# Patient Record
Sex: FEMALE | Race: ASIAN | NOT HISPANIC OR LATINO
[De-identification: names, ages, dates, MRNs, and addresses within clinical notes are randomized per-mention and may not be internally consistent; named-entity substitution may affect disease eponyms.]

---

## 2017-01-22 ENCOUNTER — RESULT REVIEW (OUTPATIENT)
Age: 62
End: 2017-01-22

## 2017-01-23 ENCOUNTER — OUTPATIENT (OUTPATIENT)
Dept: OUTPATIENT SERVICES | Facility: HOSPITAL | Age: 62
LOS: 1 days | Discharge: HOME | End: 2017-01-23
Payer: MEDICARE

## 2017-01-23 DIAGNOSIS — M43.22 FUSION OF SPINE, CERVICAL REGION: Chronic | ICD-10-CM

## 2017-01-23 DIAGNOSIS — R10.13 EPIGASTRIC PAIN: ICD-10-CM

## 2017-01-23 DIAGNOSIS — R19.4 CHANGE IN BOWEL HABIT: ICD-10-CM

## 2017-01-23 PROCEDURE — 45380 COLONOSCOPY AND BIOPSY: CPT

## 2017-01-23 PROCEDURE — 88305 TISSUE EXAM BY PATHOLOGIST: CPT

## 2017-01-23 PROCEDURE — 43239 EGD BIOPSY SINGLE/MULTIPLE: CPT

## 2017-01-23 PROCEDURE — 88313 SPECIAL STAINS GROUP 2: CPT | Mod: 26

## 2017-01-23 PROCEDURE — 88313 SPECIAL STAINS GROUP 2: CPT

## 2017-01-23 PROCEDURE — 88312 SPECIAL STAINS GROUP 1: CPT | Mod: 26

## 2017-01-23 PROCEDURE — 88312 SPECIAL STAINS GROUP 1: CPT

## 2017-01-23 PROCEDURE — 88305 TISSUE EXAM BY PATHOLOGIST: CPT | Mod: 26

## 2017-01-24 LAB — SURGICAL PATHOLOGY FINAL REPORT - CH: SIGNIFICANT CHANGE UP

## 2017-01-26 DIAGNOSIS — Z79.51 LONG TERM (CURRENT) USE OF INHALED STEROIDS: ICD-10-CM

## 2017-01-26 DIAGNOSIS — J45.909 UNSPECIFIED ASTHMA, UNCOMPLICATED: ICD-10-CM

## 2017-01-26 DIAGNOSIS — D12.4 BENIGN NEOPLASM OF DESCENDING COLON: ICD-10-CM

## 2017-01-26 DIAGNOSIS — R19.4 CHANGE IN BOWEL HABIT: ICD-10-CM

## 2017-01-26 DIAGNOSIS — K64.4 RESIDUAL HEMORRHOIDAL SKIN TAGS: ICD-10-CM

## 2017-01-26 DIAGNOSIS — K57.30 DIVERTICULOSIS OF LARGE INTESTINE WITHOUT PERFORATION OR ABSCESS WITHOUT BLEEDING: ICD-10-CM

## 2017-01-26 DIAGNOSIS — K20.9 ESOPHAGITIS, UNSPECIFIED: ICD-10-CM

## 2017-01-26 DIAGNOSIS — K29.50 UNSPECIFIED CHRONIC GASTRITIS WITHOUT BLEEDING: ICD-10-CM

## 2017-01-26 DIAGNOSIS — E03.9 HYPOTHYROIDISM, UNSPECIFIED: ICD-10-CM

## 2017-01-26 DIAGNOSIS — K64.8 OTHER HEMORRHOIDS: ICD-10-CM

## 2017-01-26 DIAGNOSIS — R10.13 EPIGASTRIC PAIN: ICD-10-CM

## 2018-01-15 ENCOUNTER — INPATIENT (INPATIENT)
Facility: HOSPITAL | Age: 63
LOS: 5 days | Discharge: HOME | DRG: 194 | End: 2018-01-21
Attending: INTERNAL MEDICINE | Admitting: INTERNAL MEDICINE
Payer: MEDICARE

## 2018-01-15 VITALS
WEIGHT: 184.97 LBS | RESPIRATION RATE: 22 BRPM | SYSTOLIC BLOOD PRESSURE: 135 MMHG | DIASTOLIC BLOOD PRESSURE: 87 MMHG | HEIGHT: 62 IN | HEART RATE: 81 BPM | OXYGEN SATURATION: 66 % | TEMPERATURE: 99 F

## 2018-01-15 DIAGNOSIS — M43.22 FUSION OF SPINE, CERVICAL REGION: Chronic | ICD-10-CM

## 2018-01-15 DIAGNOSIS — K21.9 GASTRO-ESOPHAGEAL REFLUX DISEASE WITHOUT ESOPHAGITIS: ICD-10-CM

## 2018-01-15 DIAGNOSIS — J45.901 UNSPECIFIED ASTHMA WITH (ACUTE) EXACERBATION: ICD-10-CM

## 2018-01-15 DIAGNOSIS — E78.5 HYPERLIPIDEMIA, UNSPECIFIED: ICD-10-CM

## 2018-01-15 DIAGNOSIS — M48.07 SPINAL STENOSIS, LUMBOSACRAL REGION: ICD-10-CM

## 2018-01-15 DIAGNOSIS — J10.1 INFLUENZA DUE TO OTHER IDENTIFIED INFLUENZA VIRUS WITH OTHER RESPIRATORY MANIFESTATIONS: ICD-10-CM

## 2018-01-15 DIAGNOSIS — Z29.9 ENCOUNTER FOR PROPHYLACTIC MEASURES, UNSPECIFIED: ICD-10-CM

## 2018-01-15 DIAGNOSIS — J20.9 ACUTE BRONCHITIS, UNSPECIFIED: ICD-10-CM

## 2018-01-15 DIAGNOSIS — F32.9 MAJOR DEPRESSIVE DISORDER, SINGLE EPISODE, UNSPECIFIED: ICD-10-CM

## 2018-01-15 DIAGNOSIS — E03.9 HYPOTHYROIDISM, UNSPECIFIED: ICD-10-CM

## 2018-01-15 LAB
ALBUMIN SERPL ELPH-MCNC: 3.5 G/DL — SIGNIFICANT CHANGE UP (ref 3.3–5)
ALP SERPL-CCNC: 72 U/L — SIGNIFICANT CHANGE UP (ref 30–120)
ALT FLD-CCNC: 36 U/L DA — SIGNIFICANT CHANGE UP (ref 10–60)
ANION GAP SERPL CALC-SCNC: 12 MMOL/L — SIGNIFICANT CHANGE UP (ref 5–17)
AST SERPL-CCNC: 36 U/L — SIGNIFICANT CHANGE UP (ref 10–40)
BASOPHILS # BLD AUTO: 0.1 K/UL — SIGNIFICANT CHANGE UP (ref 0–0.2)
BASOPHILS NFR BLD AUTO: 0.8 % — SIGNIFICANT CHANGE UP (ref 0–2)
BILIRUB SERPL-MCNC: 0.3 MG/DL — SIGNIFICANT CHANGE UP (ref 0.2–1.2)
BUN SERPL-MCNC: 10 MG/DL — SIGNIFICANT CHANGE UP (ref 7–23)
CALCIUM SERPL-MCNC: 9.3 MG/DL — SIGNIFICANT CHANGE UP (ref 8.4–10.5)
CHLORIDE SERPL-SCNC: 101 MMOL/L — SIGNIFICANT CHANGE UP (ref 96–108)
CK SERPL-CCNC: 205 U/L — HIGH (ref 26–192)
CO2 SERPL-SCNC: 25 MMOL/L — SIGNIFICANT CHANGE UP (ref 22–31)
CREAT SERPL-MCNC: 1.01 MG/DL — SIGNIFICANT CHANGE UP (ref 0.5–1.3)
EOSINOPHIL # BLD AUTO: 0 K/UL — SIGNIFICANT CHANGE UP (ref 0–0.5)
EOSINOPHIL NFR BLD AUTO: 0 % — SIGNIFICANT CHANGE UP (ref 0–6)
FLUAV SPEC QL CULT: POSITIVE
FLUBV AG SPEC QL IA: NEGATIVE — SIGNIFICANT CHANGE UP
GLUCOSE SERPL-MCNC: 120 MG/DL — HIGH (ref 70–99)
HCT VFR BLD CALC: 40.6 % — SIGNIFICANT CHANGE UP (ref 34.5–45)
HGB BLD-MCNC: 13 G/DL — SIGNIFICANT CHANGE UP (ref 11.5–15.5)
INR BLD: 1.09 RATIO — SIGNIFICANT CHANGE UP (ref 0.88–1.16)
LACTATE SERPL-SCNC: 1.8 MMOL/L — SIGNIFICANT CHANGE UP (ref 0.7–2)
LYMPHOCYTES # BLD AUTO: 1.9 K/UL — SIGNIFICANT CHANGE UP (ref 1–3.3)
LYMPHOCYTES # BLD AUTO: 14.4 % — SIGNIFICANT CHANGE UP (ref 13–44)
MAGNESIUM SERPL-MCNC: 1.7 MG/DL — SIGNIFICANT CHANGE UP (ref 1.6–2.6)
MCHC RBC-ENTMCNC: 29.4 PG — SIGNIFICANT CHANGE UP (ref 27–34)
MCHC RBC-ENTMCNC: 32 GM/DL — SIGNIFICANT CHANGE UP (ref 32–36)
MCV RBC AUTO: 91.7 FL — SIGNIFICANT CHANGE UP (ref 80–100)
MONOCYTES # BLD AUTO: 0.6 K/UL — SIGNIFICANT CHANGE UP (ref 0–0.9)
MONOCYTES NFR BLD AUTO: 4.6 % — SIGNIFICANT CHANGE UP (ref 2–14)
NEUTROPHILS # BLD AUTO: 10.4 K/UL — HIGH (ref 1.8–7.4)
NEUTROPHILS NFR BLD AUTO: 80.2 % — HIGH (ref 43–77)
PLATELET # BLD AUTO: 397 K/UL — SIGNIFICANT CHANGE UP (ref 150–400)
POTASSIUM SERPL-MCNC: 3.9 MMOL/L — SIGNIFICANT CHANGE UP (ref 3.5–5.3)
POTASSIUM SERPL-SCNC: 3.9 MMOL/L — SIGNIFICANT CHANGE UP (ref 3.5–5.3)
PROT SERPL-MCNC: 7.7 G/DL — SIGNIFICANT CHANGE UP (ref 6–8.3)
PROTHROM AB SERPL-ACNC: 11.9 SEC — SIGNIFICANT CHANGE UP (ref 9.8–12.7)
RBC # BLD: 4.43 M/UL — SIGNIFICANT CHANGE UP (ref 3.8–5.2)
RBC # FLD: 13.7 % — SIGNIFICANT CHANGE UP (ref 10.3–14.5)
SODIUM SERPL-SCNC: 138 MMOL/L — SIGNIFICANT CHANGE UP (ref 135–145)
TROPONIN I SERPL-MCNC: 0.02 NG/ML — SIGNIFICANT CHANGE UP (ref 0.02–0.06)
WBC # BLD: 13 K/UL — HIGH (ref 3.8–10.5)
WBC # FLD AUTO: 13 K/UL — HIGH (ref 3.8–10.5)

## 2018-01-15 PROCEDURE — 71045 X-RAY EXAM CHEST 1 VIEW: CPT | Mod: 26

## 2018-01-15 PROCEDURE — 93010 ELECTROCARDIOGRAM REPORT: CPT

## 2018-01-15 PROCEDURE — 99284 EMERGENCY DEPT VISIT MOD MDM: CPT

## 2018-01-15 RX ORDER — IPRATROPIUM/ALBUTEROL SULFATE 18-103MCG
3 AEROSOL WITH ADAPTER (GRAM) INHALATION EVERY 6 HOURS
Qty: 0 | Refills: 0 | Status: DISCONTINUED | OUTPATIENT
Start: 2018-01-15 | End: 2018-01-21

## 2018-01-15 RX ORDER — ACETAMINOPHEN 500 MG
650 TABLET ORAL EVERY 6 HOURS
Qty: 0 | Refills: 0 | Status: DISCONTINUED | OUTPATIENT
Start: 2018-01-15 | End: 2018-01-21

## 2018-01-15 RX ORDER — TRAMADOL HYDROCHLORIDE 50 MG/1
100 TABLET ORAL EVERY 6 HOURS
Qty: 0 | Refills: 0 | Status: DISCONTINUED | OUTPATIENT
Start: 2018-01-15 | End: 2018-01-21

## 2018-01-15 RX ORDER — PANTOPRAZOLE SODIUM 20 MG/1
40 TABLET, DELAYED RELEASE ORAL
Qty: 0 | Refills: 0 | Status: DISCONTINUED | OUTPATIENT
Start: 2018-01-15 | End: 2018-01-21

## 2018-01-15 RX ORDER — ZOLPIDEM TARTRATE 10 MG/1
5 TABLET ORAL AT BEDTIME
Qty: 0 | Refills: 0 | Status: DISCONTINUED | OUTPATIENT
Start: 2018-01-15 | End: 2018-01-21

## 2018-01-15 RX ORDER — MONTELUKAST 4 MG/1
10 TABLET, CHEWABLE ORAL AT BEDTIME
Qty: 0 | Refills: 0 | Status: DISCONTINUED | OUTPATIENT
Start: 2018-01-15 | End: 2018-01-21

## 2018-01-15 RX ORDER — GABAPENTIN 400 MG/1
600 CAPSULE ORAL THREE TIMES A DAY
Qty: 0 | Refills: 0 | Status: DISCONTINUED | OUTPATIENT
Start: 2018-01-15 | End: 2018-01-21

## 2018-01-15 RX ORDER — TRAMADOL HYDROCHLORIDE 50 MG/1
50 TABLET ORAL EVERY 6 HOURS
Qty: 0 | Refills: 0 | Status: DISCONTINUED | OUTPATIENT
Start: 2018-01-15 | End: 2018-01-21

## 2018-01-15 RX ORDER — ALBUTEROL 90 UG/1
2 AEROSOL, METERED ORAL EVERY 6 HOURS
Qty: 0 | Refills: 0 | Status: DISCONTINUED | OUTPATIENT
Start: 2018-01-15 | End: 2018-01-21

## 2018-01-15 RX ORDER — SODIUM CHLORIDE 9 MG/ML
1000 INJECTION INTRAMUSCULAR; INTRAVENOUS; SUBCUTANEOUS
Qty: 0 | Refills: 0 | Status: DISCONTINUED | OUTPATIENT
Start: 2018-01-15 | End: 2018-01-16

## 2018-01-15 RX ORDER — BUDESONIDE AND FORMOTEROL FUMARATE DIHYDRATE 160; 4.5 UG/1; UG/1
2 AEROSOL RESPIRATORY (INHALATION)
Qty: 0 | Refills: 0 | Status: DISCONTINUED | OUTPATIENT
Start: 2018-01-15 | End: 2018-01-21

## 2018-01-15 RX ORDER — ESCITALOPRAM OXALATE 10 MG/1
10 TABLET, FILM COATED ORAL DAILY
Qty: 0 | Refills: 0 | Status: DISCONTINUED | OUTPATIENT
Start: 2018-01-15 | End: 2018-01-21

## 2018-01-15 RX ORDER — ENOXAPARIN SODIUM 100 MG/ML
40 INJECTION SUBCUTANEOUS EVERY 24 HOURS
Qty: 0 | Refills: 0 | Status: DISCONTINUED | OUTPATIENT
Start: 2018-01-15 | End: 2018-01-21

## 2018-01-15 RX ORDER — TIOTROPIUM BROMIDE 18 UG/1
1 CAPSULE ORAL; RESPIRATORY (INHALATION) DAILY
Qty: 0 | Refills: 0 | Status: DISCONTINUED | OUTPATIENT
Start: 2018-01-15 | End: 2018-01-21

## 2018-01-15 RX ORDER — IPRATROPIUM/ALBUTEROL SULFATE 18-103MCG
3 AEROSOL WITH ADAPTER (GRAM) INHALATION ONCE
Qty: 0 | Refills: 0 | Status: COMPLETED | OUTPATIENT
Start: 2018-01-15 | End: 2018-01-15

## 2018-01-15 RX ORDER — IPRATROPIUM/ALBUTEROL SULFATE 18-103MCG
3 AEROSOL WITH ADAPTER (GRAM) INHALATION EVERY 4 HOURS
Qty: 0 | Refills: 0 | Status: DISCONTINUED | OUTPATIENT
Start: 2018-01-15 | End: 2018-01-21

## 2018-01-15 RX ORDER — LEVOTHYROXINE SODIUM 125 MCG
50 TABLET ORAL DAILY
Qty: 0 | Refills: 0 | Status: DISCONTINUED | OUTPATIENT
Start: 2018-01-15 | End: 2018-01-21

## 2018-01-15 RX ADMIN — Medication 3 MILLILITER(S): at 16:09

## 2018-01-15 RX ADMIN — Medication 650 MILLIGRAM(S): at 22:11

## 2018-01-15 RX ADMIN — ENOXAPARIN SODIUM 40 MILLIGRAM(S): 100 INJECTION SUBCUTANEOUS at 18:02

## 2018-01-15 RX ADMIN — TRAMADOL HYDROCHLORIDE 50 MILLIGRAM(S): 50 TABLET ORAL at 18:03

## 2018-01-15 RX ADMIN — MONTELUKAST 10 MILLIGRAM(S): 4 TABLET, CHEWABLE ORAL at 22:10

## 2018-01-15 RX ADMIN — TRAMADOL HYDROCHLORIDE 50 MILLIGRAM(S): 50 TABLET ORAL at 18:56

## 2018-01-15 RX ADMIN — Medication 40 MILLIGRAM(S): at 22:11

## 2018-01-15 RX ADMIN — GABAPENTIN 600 MILLIGRAM(S): 400 CAPSULE ORAL at 22:10

## 2018-01-15 RX ADMIN — Medication 3 MILLILITER(S): at 19:15

## 2018-01-15 RX ADMIN — Medication 40 MILLIGRAM(S): at 17:07

## 2018-01-15 RX ADMIN — TRAMADOL HYDROCHLORIDE 50 MILLIGRAM(S): 50 TABLET ORAL at 17:08

## 2018-01-15 RX ADMIN — BUDESONIDE AND FORMOTEROL FUMARATE DIHYDRATE 2 PUFF(S): 160; 4.5 AEROSOL RESPIRATORY (INHALATION) at 19:01

## 2018-01-15 RX ADMIN — SODIUM CHLORIDE 60 MILLILITER(S): 9 INJECTION INTRAMUSCULAR; INTRAVENOUS; SUBCUTANEOUS at 17:09

## 2018-01-15 RX ADMIN — Medication 75 MILLIGRAM(S): at 18:32

## 2018-01-15 RX ADMIN — Medication 200 MILLIGRAM(S): at 17:16

## 2018-01-15 NOTE — H&P ADULT - NSHPSOCIALHISTORY_GEN_ALL_CORE
Social History:    Marital Status:   Occupation:   Lives with:     Substance Use :  Tobacco Usage:  (   ) never smoked   (   ) former smoker   (   ) current smoker  (     ) pack year  (        ) last tobacco use date  Alcohol Usage:    (     ) Advanced Directives: (     ) declined   [  ] health care proxy Social History:    Marital Status:   Occupation: Retired  Lives with: Spouse     Substance Use :  Tobacco Usage:  (X) never smoked   (   ) former smoker   (   ) current smoker  (     ) pack year  (        ) last tobacco use date  Alcohol Usage: Denies    (X) Advanced Directives: (X) declined   [  ] health care proxy

## 2018-01-15 NOTE — H&P ADULT - NSHPREVIEWOFSYSTEMS_GEN_ALL_CORE
REVIEW OF SYSTEMS:  CONSTITUTIONAL: + fever, no weight loss, + fatigue  EYES: No eye pain, visual disturbances, or discharge  ENMT:  No difficulty hearing, tinnitus, vertigo; No sinus or throat pain  NECK: No pain or stiffness  BREASTS: No pain, masses, or nipple discharge  RESPIRATORY: + cough, + wheezing, + shortness of breath  CARDIOVASCULAR: No chest pain, palpitations, dizziness, or leg swelling  GASTROINTESTINAL: No abdominal or epigastric pain. No nausea, vomiting, or hematemesis; No diarrhea or constipation. No melena or hematochezia.  GENITOURINARY: No dysuria, frequency, hematuria, or incontinence  NEUROLOGICAL: No headaches, memory loss, loss of strength, numbness, or tremors  SKIN: No itching, burning, rashes, or lesions   LYMPH NODES: No enlarged glands  ENDOCRINE: No heat or cold intolerance; No hair loss; No polydipsia or polyuria  MUSCULOSKELETAL: + Back pain and body aches.   PSYCHIATRIC: No depression, anxiety, mood swings, or difficulty sleeping  HEME/LYMPH: No easy bruising, or bleeding gums  ALLERGY AND IMMUNOLOGIC: No hives or eczema

## 2018-01-15 NOTE — ED ADULT NURSE NOTE - PMH
Asthma  trigger getting a cold. hospitalized multipl  times last 2007 denies intubation  Cervical Disc Displacement  current diagnosis  Chronic Bronchitis    Depression    GERD (Gastroesophageal Reflux Disease)    Hyperlipidemia    Hypothyroidism    Osteopenia    Spinal stenosis

## 2018-01-15 NOTE — ED ADULT NURSE NOTE - ED STAT RN HANDOFF WHERE
patient on stretcher Trauma  #1 admitted and will hold in ER.  Patient on droplet isolation for flu.

## 2018-01-15 NOTE — ED ADULT NURSE NOTE - PSH
Cervical vertebral fusion    Kyphosis    S/P Cholecystectomy  open   1989  S/P Colonoscopy  2005  wnl  S/P Foot Surgery  ORIF left foot  Termination of Pregnancy  x3  remote

## 2018-01-15 NOTE — ED PROVIDER NOTE - OBJECTIVE STATEMENT
63 y/o F pt with hx of HLD presents to ED c/o cough, SOB chills and fevers for a few days. pt tried Nebulizer treatment, Tamiflu and OTC medication with no relief. She was seen by Dr. Villalta today; given a dose of steroids and breathing treatment with no improvement and sent to ED. Denies chest pain, nausea, vomiting. No further complaints at this time.

## 2018-01-15 NOTE — H&P ADULT - NSHPPHYSICALEXAM_GEN_ALL_CORE
Vital Signs Last 24 Hrs  T(C): 37.1 (15 Alonso 2018 15:27), Max: 37.1 (15 Alonso 2018 15:27)  T(F): 98.7 (15 Alonso 2018 15:27), Max: 98.7 (15 Alonso 2018 15:27)  HR: 81 (15 Alonso 2018 15:27) (81 - 81)  BP: 135/87 (15 Alonso 2018 15:27) (135/87 - 135/87)  BP(mean): --  RR: 22 (15 Alonso 2018 15:27) (22 - 22)  SpO2: 66% (15 Alonso 2018 15:27) (66% - 66%)    PHYSICAL EXAM:  GENERAL: NAD, well-groomed, well-developed  HEAD:  Atraumatic, Normocephalic  EYES: EOMI, PERRLA, conjunctiva and sclera clear  ENMT: No tonsillar erythema, exudates, or enlargement; Moist mucous membranes, Good dentition, No lesions  NECK: Supple, No JVD, Normal thyroid  CHEST/LUNG: Bilateral extensive expiratory wheeze +. Occasional rhonchi +   HEART: Regular rate and rhythm; No murmurs, rubs, or gallops  ABDOMEN: Soft, Nontender, Nondistended; Bowel sounds present  EXTREMITIES:  2+ Peripheral Pulses, No clubbing, cyanosis, or edema  MS: No joint swelling or deformity.   LYMPH: No lymphadenopathy noted  SKIN: No rashes or lesions  NERVOUS SYSTEM:  Motor Strength 4/5 B/L upper and lower extremities; DTRs 2+ intact and symmetric  PSYCH:  Alert & Oriented X3, Good concentration.

## 2018-01-15 NOTE — H&P ADULT - PROBLEM SELECTOR PLAN 1
Patient with acute exacerbation of asthma with asthmatic bronchitis, most likely due to acute influenza A.   Will place patient on Solumedrol and Duoneb.  Continue Symbicort and Spiriva.   Pulmonary follow up.   Further management as per patient's clinical course.

## 2018-01-15 NOTE — H&P ADULT - PROBLEM SELECTOR PLAN 2
Patient with acute Influenza A.  Will place patient on Tamiflu.  Continue supportive care.  Droplet precautions.

## 2018-01-15 NOTE — H&P ADULT - HISTORY OF PRESENT ILLNESS
62 years old female with past medical history of Asthma, Cervical Disc Displacement, Chronic Bronchitis, Depression, GERD (Gastroesophageal Reflux Disease), Hyperlipidemia, Hypothyroidism, Osteopenia and spinal stenosis, who has been having increasing shortness of breath and cough for last 3-4 days. Patient also c/o fevers and body aches. Patient was seen by Dr. Mcwilliams in office today and received IM Steroids and DuoNeb. Patient continued to have shortness of breath so she came in to ER. She is c/o cough, which is not productive of sputum. 62 years old female with past medical history of Asthma, Cervical Disc Displacement, Chronic Bronchitis, Depression, GERD (Gastroesophageal Reflux Disease), Hyperlipidemia, Hypothyroidism, Osteopenia and spinal stenosis, who has been having increasing shortness of breath and cough for last 3-4 days. Patient also c/o fevers and body aches. Patient was seen by Dr. Mcwilliams in office today and received IM Steroids and DuoNeb. Patient continued to have shortness of breath so she came in to ER. She is c/o cough, which is not productive of sputum.     Patient is being admitted for further care, as she failed out patient treatment.     Patient denies any orthopnea or PND.   + Fever.   No dizziness or syncope.

## 2018-01-15 NOTE — ED ADULT NURSE NOTE - OBJECTIVE STATEMENT
Patient walked into ER from Dr. Mcwilliams's office for shortness of breath with cough causing chest and back pain for 3 days.  Patient given breathing treatment and steriod injection at Dr. Mcwilliams's office.  On arrival mask placed on patient.  O2 SATS 100% room air.

## 2018-01-15 NOTE — H&P ADULT - ASSESSMENT
62 years old female with past medical history of Asthma, Cervical Disc Displacement, Chronic Bronchitis, Depression, GERD (Gastroesophageal Reflux Disease), Hyperlipidemia, Hypothyroidism, Osteopenia and spinal stenosis, who has been having increasing shortness of breath and cough for last 3-4 days. Patient also c/o fevers and body aches. Patient was seen by Dr. Mcwilliams in office today and received IM Steroids and DuoNeb. Patient continued to have shortness of breath so she came in to ER. She is c/o cough, which is not productive of sputum.     Patient is being admitted for further care.

## 2018-01-16 DIAGNOSIS — K59.00 CONSTIPATION, UNSPECIFIED: ICD-10-CM

## 2018-01-16 LAB
CHOLEST SERPL-MCNC: 215 MG/DL — HIGH (ref 10–199)
CK SERPL-CCNC: 126 U/L — SIGNIFICANT CHANGE UP (ref 26–192)
HBA1C BLD-MCNC: 6 % — HIGH (ref 4–5.6)
HDLC SERPL-MCNC: 82 MG/DL — SIGNIFICANT CHANGE UP (ref 40–125)
LIPID PNL WITH DIRECT LDL SERPL: 105 MG/DL — SIGNIFICANT CHANGE UP
T3 SERPL-MCNC: 89 NG/DL — SIGNIFICANT CHANGE UP (ref 80–200)
T4 AB SER-ACNC: 8.7 UG/DL — SIGNIFICANT CHANGE UP (ref 4.6–12)
TOTAL CHOLESTEROL/HDL RATIO MEASUREMENT: 2.6 RATIO — LOW (ref 3.3–7.1)
TRIGL SERPL-MCNC: 139 MG/DL — SIGNIFICANT CHANGE UP (ref 10–149)
TROPONIN I SERPL-MCNC: 0.02 NG/ML — SIGNIFICANT CHANGE UP (ref 0.02–0.06)
TSH SERPL-MCNC: 0.86 UIU/ML — SIGNIFICANT CHANGE UP (ref 0.27–4.2)

## 2018-01-16 RX ORDER — POLYETHYLENE GLYCOL 3350 17 G/17G
17 POWDER, FOR SOLUTION ORAL
Qty: 0 | Refills: 0 | Status: DISCONTINUED | OUTPATIENT
Start: 2018-01-16 | End: 2018-01-21

## 2018-01-16 RX ORDER — ATORVASTATIN CALCIUM 80 MG/1
80 TABLET, FILM COATED ORAL AT BEDTIME
Qty: 0 | Refills: 0 | Status: DISCONTINUED | OUTPATIENT
Start: 2018-01-16 | End: 2018-01-21

## 2018-01-16 RX ORDER — MAGNESIUM HYDROXIDE 400 MG/1
30 TABLET, CHEWABLE ORAL DAILY
Qty: 0 | Refills: 0 | Status: DISCONTINUED | OUTPATIENT
Start: 2018-01-16 | End: 2018-01-21

## 2018-01-16 RX ORDER — DOCUSATE SODIUM 100 MG
100 CAPSULE ORAL THREE TIMES A DAY
Qty: 0 | Refills: 0 | Status: DISCONTINUED | OUTPATIENT
Start: 2018-01-16 | End: 2018-01-21

## 2018-01-16 RX ORDER — SENNA PLUS 8.6 MG/1
2 TABLET ORAL AT BEDTIME
Qty: 0 | Refills: 0 | Status: DISCONTINUED | OUTPATIENT
Start: 2018-01-16 | End: 2018-01-21

## 2018-01-16 RX ADMIN — TRAMADOL HYDROCHLORIDE 50 MILLIGRAM(S): 50 TABLET ORAL at 22:17

## 2018-01-16 RX ADMIN — ZOLPIDEM TARTRATE 5 MILLIGRAM(S): 10 TABLET ORAL at 01:39

## 2018-01-16 RX ADMIN — PANTOPRAZOLE SODIUM 40 MILLIGRAM(S): 20 TABLET, DELAYED RELEASE ORAL at 06:12

## 2018-01-16 RX ADMIN — Medication 100 MILLIGRAM(S): at 22:03

## 2018-01-16 RX ADMIN — Medication 3 MILLILITER(S): at 01:27

## 2018-01-16 RX ADMIN — ALBUTEROL 2 PUFF(S): 90 AEROSOL, METERED ORAL at 12:50

## 2018-01-16 RX ADMIN — MONTELUKAST 10 MILLIGRAM(S): 4 TABLET, CHEWABLE ORAL at 22:03

## 2018-01-16 RX ADMIN — ATORVASTATIN CALCIUM 80 MILLIGRAM(S): 80 TABLET, FILM COATED ORAL at 22:03

## 2018-01-16 RX ADMIN — TRAMADOL HYDROCHLORIDE 50 MILLIGRAM(S): 50 TABLET ORAL at 12:56

## 2018-01-16 RX ADMIN — ESCITALOPRAM OXALATE 10 MILLIGRAM(S): 10 TABLET, FILM COATED ORAL at 13:24

## 2018-01-16 RX ADMIN — Medication 3 MILLILITER(S): at 19:24

## 2018-01-16 RX ADMIN — Medication 100 MILLIGRAM(S): at 22:04

## 2018-01-16 RX ADMIN — TRAMADOL HYDROCHLORIDE 50 MILLIGRAM(S): 50 TABLET ORAL at 12:29

## 2018-01-16 RX ADMIN — Medication 40 MILLIGRAM(S): at 22:03

## 2018-01-16 RX ADMIN — GABAPENTIN 600 MILLIGRAM(S): 400 CAPSULE ORAL at 22:03

## 2018-01-16 RX ADMIN — Medication 40 MILLIGRAM(S): at 13:24

## 2018-01-16 RX ADMIN — Medication 200 MILLIGRAM(S): at 01:39

## 2018-01-16 RX ADMIN — Medication 200 MILLIGRAM(S): at 12:50

## 2018-01-16 RX ADMIN — Medication 75 MILLIGRAM(S): at 17:15

## 2018-01-16 RX ADMIN — BUDESONIDE AND FORMOTEROL FUMARATE DIHYDRATE 2 PUFF(S): 160; 4.5 AEROSOL RESPIRATORY (INHALATION) at 18:30

## 2018-01-16 RX ADMIN — ENOXAPARIN SODIUM 40 MILLIGRAM(S): 100 INJECTION SUBCUTANEOUS at 17:15

## 2018-01-16 RX ADMIN — POLYETHYLENE GLYCOL 3350 17 GRAM(S): 17 POWDER, FOR SOLUTION ORAL at 17:15

## 2018-01-16 RX ADMIN — ALBUTEROL 2 PUFF(S): 90 AEROSOL, METERED ORAL at 01:40

## 2018-01-16 RX ADMIN — Medication 650 MILLIGRAM(S): at 06:12

## 2018-01-16 RX ADMIN — TRAMADOL HYDROCHLORIDE 100 MILLIGRAM(S): 50 TABLET ORAL at 01:41

## 2018-01-16 RX ADMIN — Medication 100 MILLIGRAM(S): at 12:50

## 2018-01-16 RX ADMIN — TRAMADOL HYDROCHLORIDE 50 MILLIGRAM(S): 50 TABLET ORAL at 22:47

## 2018-01-16 RX ADMIN — GABAPENTIN 600 MILLIGRAM(S): 400 CAPSULE ORAL at 13:24

## 2018-01-16 RX ADMIN — Medication 3 MILLILITER(S): at 13:12

## 2018-01-16 RX ADMIN — TIOTROPIUM BROMIDE 1 CAPSULE(S): 18 CAPSULE ORAL; RESPIRATORY (INHALATION) at 09:20

## 2018-01-16 RX ADMIN — GABAPENTIN 600 MILLIGRAM(S): 400 CAPSULE ORAL at 06:12

## 2018-01-16 RX ADMIN — Medication 75 MILLIGRAM(S): at 06:13

## 2018-01-16 RX ADMIN — Medication 50 MICROGRAM(S): at 06:13

## 2018-01-16 RX ADMIN — Medication 100 MILLIGRAM(S): at 13:48

## 2018-01-16 RX ADMIN — BUDESONIDE AND FORMOTEROL FUMARATE DIHYDRATE 2 PUFF(S): 160; 4.5 AEROSOL RESPIRATORY (INHALATION) at 09:20

## 2018-01-16 RX ADMIN — SENNA PLUS 2 TABLET(S): 8.6 TABLET ORAL at 22:03

## 2018-01-16 RX ADMIN — Medication 3 MILLILITER(S): at 08:30

## 2018-01-16 RX ADMIN — Medication 40 MILLIGRAM(S): at 06:13

## 2018-01-16 NOTE — PROGRESS NOTE ADULT - SUBJECTIVE AND OBJECTIVE BOX
Patient is a 62y old  Female who presents with a chief complaint of flu (15 Alonso 2018 18:32)    HPI:  62 years old female with past medical history of Asthma, Cervical Disc Displacement, Chronic Bronchitis, Depression, GERD (Gastroesophageal Reflux Disease), Hyperlipidemia, Hypothyroidism, Osteopenia and spinal stenosis, who has been having increasing shortness of breath and cough for last 3-4 days. Patient also c/o fevers and body aches. Patient was seen by Dr. Mcwilliams in office today and received IM Steroids and DuoNeb. Patient continued to have shortness of breath so she came in to ER. She is c/o cough, which is not productive of sputum.     Patient is being admitted for further care, as she failed out patient treatment.     Patient denies any orthopnea or PND.   + Fever.   No dizziness or syncope. (15 Alonso 2018 16:42)    INTERVAL HPI/OVERNIGHT EVENTS:  Chart reviwed, notes reviewed.  Patient seen and examined.  Patient still having cough and shortness of breath.   Denies any chest pain or pressure.   Patient c/o back pain.   Also c/o constipation.     MEDICATIONS  (STANDING):  ALBUTerol/ipratropium for Nebulization 3 milliLiter(s) Nebulizer every 6 hours  buDESOnide 160 MICROgram(s)/formoterol 4.5 MICROgram(s) Inhaler 2 Puff(s) Inhalation two times a day  docusate sodium 100 milliGRAM(s) Oral three times a day  enoxaparin Injectable 40 milliGRAM(s) SubCutaneous every 24 hours  escitalopram 10 milliGRAM(s) Oral daily  gabapentin 600 milliGRAM(s) Oral three times a day  levothyroxine 50 MICROGram(s) Oral daily  methylPREDNISolone sodium succinate Injectable 40 milliGRAM(s) IV Push every 8 hours  montelukast 10 milliGRAM(s) Oral at bedtime  oseltamivir 75 milliGRAM(s) Oral two times a day  pantoprazole    Tablet 40 milliGRAM(s) Oral before breakfast  polyethylene glycol 3350 17 Gram(s) Oral two times a day  senna 2 Tablet(s) Oral at bedtime  sodium chloride 0.9%. 1000 milliLiter(s) (60 mL/Hr) IV Continuous <Continuous>  tiotropium 18 MICROgram(s) Capsule 1 Capsule(s) Inhalation daily    MEDICATIONS  (PRN):  acetaminophen   Tablet. 650 milliGRAM(s) Oral every 6 hours PRN Mild Pain (1 - 3)  ALBUTerol    90 MICROgram(s) HFA Inhaler 2 Puff(s) Inhalation every 6 hours PRN Shortness of Breath and/or Wheezing  ALBUTerol/ipratropium for Nebulization 3 milliLiter(s) Nebulizer every 4 hours PRN Shortness of Breath and/or Wheezing  benzonatate 100 milliGRAM(s) Oral every 8 hours PRN Cough  bisacodyl Suppository 10 milliGRAM(s) Rectal daily PRN Constipation  guaiFENesin    Syrup 200 milliGRAM(s) Oral every 6 hours PRN Cough  magnesium hydroxide Suspension 30 milliLiter(s) Oral daily PRN Constipation  traMADol 50 milliGRAM(s) Oral every 6 hours PRN Moderate Pain (4 - 6)  traMADol 100 milliGRAM(s) Oral every 6 hours PRN Severe Pain (7 - 10)  zolpidem 5 milliGRAM(s) Oral at bedtime PRN Insomnia      Allergies: No Known Allergies    Intolerances    REVIEW OF SYSTEMS:  CONSTITUTIONAL: + fever, + fatigue  EYES: No eye pain, visual disturbances, or discharge  ENMT:  No difficulty hearing, tinnitus, vertigo; No sinus or throat pain  NECK: No pain or stiffness  BREASTS: No pain, masses, or nipple discharge  RESPIRATORY: + cough, + wheezing, + shortness of breath  CARDIOVASCULAR: No chest pain, palpitations, dizziness, or leg swelling  GASTROINTESTINAL: No abdominal or epigastric pain. No nausea, vomiting, or hematemesis; No diarrhea or constipation. No melena or hematochezia.  GENITOURINARY: No dysuria, frequency, hematuria, or incontinence  NEUROLOGICAL: No headaches, memory loss, loss of strength, numbness, or tremors  SKIN: No itching, burning, rashes, or lesions   LYMPH NODES: No enlarged glands  ENDOCRINE: No heat or cold intolerance; No hair loss; No polydipsia or polyuria  MUSCULOSKELETAL: No back pain  PSYCHIATRIC: No depression, anxiety, mood swings, or difficulty sleeping  HEME/LYMPH: No easy bruising, or bleeding gums  ALLERGY AND IMMUNOLOGIC: No hives or eczema    Vital Signs Last 24 Hrs  T(C): 36.4 (16 Jan 2018 06:32), Max: 37.9 (16 Jan 2018 02:07)  T(F): 97.6 (16 Jan 2018 06:32), Max: 100.3 (16 Jan 2018 02:07)  HR: 78 (16 Jan 2018 13:12) (64 - 91)  BP: 120/59 (16 Jan 2018 06:32) (119/54 - 164/83)  BP(mean): --  RR: 16 (16 Jan 2018 06:32) (14 - 19)  SpO2: 97% (16 Jan 2018 08:30) (96% - 100%)    PHYSICAL EXAM:  GENERAL: NAD, well-groomed, well-developed  HEAD:  Atraumatic, Normocephalic  EYES: EOMI, PERRLA, conjunctiva and sclera clear  ENMT: No tonsillar erythema, exudates, or enlargement; Moist mucous membranes, Good dentition, No lesions  NECK: Supple, No JVD, Normal thyroid  NERVOUS SYSTEM:  Alert & Oriented X3, Good concentration; Bilateral LE mobile, sensation to light touch intact  CHEST/LUNG: Bilateral expiratory wheeze +. No rhonchi.    HEART: Regular rate and rhythm; No murmurs, rubs, or gallops  ABDOMEN: Soft, Nontender, Nondistended; Bowel sounds present  EXTREMITIES:  2+ Peripheral Pulses, No clubbing or cyanosis  LYMPH: No lymphadenopathy noted  SKIN: No rashes or lesions  INCISION:  Dressing dry and intact    LABS:             12.3   7.8   )-----------( 337      ( 16 Jan 2018 06:43 )             35.8     16 Jan 2018 06:43    142    |  107    |  11     ----------------------------<  177    4.6     |  26     |  0.81     Ca    8.8        16 Jan 2018 06:43  Mg     1.8       16 Jan 2018 06:43    TPro  7.7    /  Alb  3.5    /  TBili  0.3    /  DBili  x      /  AST  36     /  ALT  36     /  AlkPhos  72     15 Alonso 2018 16:33    PT/INR - ( 15 Alonso 2018 16:33 )   PT: 11.9 sec;   INR: 1.09 ratio      RADIOLOGY & ADDITIONAL TESTS:  < from: Xray Chest 1 View AP- PORTABLE-Urgent (01.15.18 @ 16:35) >  EXAM:  XR CHEST PORTABLE URGENT 1V                        PROCEDURE DATE:  01/15/2018    INTERPRETATION:  cough    A frontal chest film  demonstrates grossly clear lungs.  No acute   infiltrate or consolidation is  noted. The costophrenic angles are   grossly clear.    The heart size and vascular markings are within normal limits for   technique.      No mediastinal or hilar adenopathy is suggested. .     The osseous structures appear intact intact.     IMPRESSION:  Clear  lungs.  No acute airspace disease suggested.    < end of copied text >    Imaging Personally Reviewed:  [ ] YES      Consultant(s) Notes Reviewed:     Care Discussed with Consultants/Other Providers:

## 2018-01-16 NOTE — CONSULT NOTE ADULT - SUBJECTIVE AND OBJECTIVE BOX
HPI:  62 years old female with past medical history of Asthma, Cervical Disc Displacement,   Chronic Bronchitis, Depression, GERD, spinal stenosis presenting to the hospital with   CC of fever cough and worsening sob. Seen at her pulm office but did not get better.  Tested + for Influenza A. Tmax in hospital 100.3. + ocugh SOB and wheeze.  Denies   n/v/diarrhea CP abd pain or urinary symptoms. no recent travel or sick contacts.    Infectious Disease consult was called to evaluate pt and for antibiotoc management.    Past Medical & Surgical Hx:  PAST MEDICAL & SURGICAL HISTORY:  Depression  Hypothyroidism  Spinal stenosis  GERD (Gastroesophageal Reflux Disease)  Chronic Bronchitis  Asthma: trigger getting a cold. hospitalized multipl  times last 2007 denies intubation  Hyperlipidemia  Osteopenia  Cervical Disc Displacement: current diagnosis  Cervical vertebral fusion  S/P Colonoscopy: 2005  wnl  S/P Foot Surgery: ORIF left foot  S/P Cholecystectomy: open   1989  Kyphosis  Termination of Pregnancy: x3  remote      Social History--  EtOH: denies   Tobacco: denies   Drug Use: denies   Lives with family    FAMILY HISTORY:  No pertinent family history in first degree relatives    Allergies  No Known Allergies    Home Medications:  acetaminophen 325 mg oral tablet: 2 tab(s) orally every 6 hours, As needed, For Temp over 37.9 C (100.2 F) (15 Alonso 2018 16:02)  albuterol-ipratropium 2.5 mg-0.5 mg/3 mL inhalation solution: 3 milliliter(s) inhaled every 4 hours, As Needed (15 Alonso 2018 16:02)  bisacodyl 10 mg rectal suppository: 1 suppository(ies) rectal once a day, As needed, Constipation (15 Alonso 2018 16:02)  escitalopram 10 mg oral tablet: 1 tab(s) orally once a day (15 Alonso 2018 16:02)  Neurontin 600 mg oral tablet: 1 tab(s) orally 3 times a day (15 Alonso 2018 16:02)  NexIUM 20 mg oral delayed release capsule: 1 cap(s) orally once a day (15 Alonso 2018 16:02)  predniSONE 20 mg oral tablet: 2 tab(s) orally once a day (15 Alonso 2018 17:00)  Singulair 10 mg oral tablet: 1 tab(s) orally once a day (in the evening) (15 Alonso 2018 16:02)  Spiriva 18 mcg inhalation capsule: 1 each inhaled once a day (at bedtime) (15 Alonso 2018 16:02)  Symbicort 160 mcg-4.5 mcg/inh inhalation aerosol: 2 puff(s) inhaled 2 times a day (15 Alonso 2018 17:00)  Synthroid 50 mcg (0.05 mg) oral tablet: 1 tab(s) orally once a day (15 Alonso 2018 16:02)  Ventolin CFC free 90 mcg/inh inhalation aerosol: 2 puff(s) inhaled 4 times a day, As Needed (15 Alonso 2018 16:02)  zolpidem 5 mg oral tablet: 1 tab(s) orally once a day (at bedtime), As needed, Insomnia (15 Alonso 2018 16:02)    Current Inpatient Medications :    ANTIBIOTICS:   oseltamivir 75 milliGRAM(s) Oral two times a day      OTHER RELEVANT MEDICATIONS :  acetaminophen   Tablet. 650 milliGRAM(s) Oral every 6 hours PRN  ALBUTerol    90 MICROgram(s) HFA Inhaler 2 Puff(s) Inhalation every 6 hours PRN  ALBUTerol/ipratropium for Nebulization 3 milliLiter(s) Nebulizer every 6 hours  ALBUTerol/ipratropium for Nebulization 3 milliLiter(s) Nebulizer every 4 hours PRN  benzonatate 100 milliGRAM(s) Oral every 8 hours PRN  bisacodyl Suppository 10 milliGRAM(s) Rectal daily PRN  buDESOnide 160 MICROgram(s)/formoterol 4.5 MICROgram(s) Inhaler 2 Puff(s) Inhalation two times a day  enoxaparin Injectable 40 milliGRAM(s) SubCutaneous every 24 hours  escitalopram 10 milliGRAM(s) Oral daily  gabapentin 600 milliGRAM(s) Oral three times a day  guaiFENesin    Syrup 200 milliGRAM(s) Oral every 6 hours PRN  levothyroxine 50 MICROGram(s) Oral daily  methylPREDNISolone sodium succinate Injectable 40 milliGRAM(s) IV Push every 8 hours  montelukast 10 milliGRAM(s) Oral at bedtime  pantoprazole    Tablet 40 milliGRAM(s) Oral before breakfast  sodium chloride 0.9%. 1000 milliLiter(s) IV Continuous <Continuous>  tiotropium 18 MICROgram(s) Capsule 1 Capsule(s) Inhalation daily  traMADol 50 milliGRAM(s) Oral every 6 hours PRN  traMADol 100 milliGRAM(s) Oral every 6 hours PRN  zolpidem 5 milliGRAM(s) Oral at bedtime PRN      ROS:  CONSTITUTIONAL:  + fever no chills,  EYES:  Negative  blurry vision or double vision  CARDIOVASCULAR:  Negative for chest pain or palpitations  RESPIRATORY:  +cough, wheezing, or SOB   GASTROINTESTINAL:  Negative for nausea, vomiting, diarrhea, constipation, or abdominal pain  GENITOURINARY:  Negative frequency, urgency , dysuria or hematuria   NEUROLOGIC:  No headache, confusion, dizziness, lightheadedness  All other systems were reviewed and are negative    I&O's Detail      Physical Exam:  Vital Signs Last 24 Hrs  T(C): 36.4 (16 Jan 2018 06:32), Max: 37.9 (16 Jan 2018 02:07)  T(F): 97.6 (16 Jan 2018 06:32), Max: 100.3 (16 Jan 2018 02:07)  HR: 78 (16 Jan 2018 08:30) (64 - 91)  BP: 120/59 (16 Jan 2018 06:32) (119/54 - 164/83)  BP(mean): --  RR: 16 (16 Jan 2018 06:32) (14 - 22)  SpO2: 97% (16 Jan 2018 08:30) (66% - 100%)  Height (cm): 157.48 (01-15 @ 15:27)  Weight (kg): 83.9 (01-15 @ 15:27)  BMI (kg/m2): 33.8 (01-15 @ 15:27)  BSA (m2): 1.85 (01-15 @ 15:27)    General: well developed well nourished, in no acute distress  Eyes: sclera anicteric, pupils equal and reactive to light  ENMT: buccal mucosa moist, pharynx not injected  Neck: supple, trachea midline  Lungs: clear, no wheeze/rhonchi  Cardiovascular: regular rate and rhythm, S1 S2  Abdomen: soft, nontender, no organomegaly present, bowel sounds normal  Neurological:  alert and oriented x3, Cranial Nerves II-XII grossly intact  Skin:no increased ecchymosis/petechiae/purpura  Lymph Nodes: no palpable cervical/supraclavicular lymph nodes enlargements  Extremities: no cyanosis/clubbing/edema    Labs:                           12.3   7.8   )-----------( 337      ( 16 Jan 2018 06:43 )             35.8   01-16    142  |  107  |  11  ----------------------------<  177<H>  4.6   |  26  |  0.81    Ca    8.8      16 Jan 2018 06:43  Mg     1.8     01-16    TPro  7.7  /  Alb  3.5  /  TBili  0.3  /  DBili  x   /  AST  36  /  ALT  36  /  AlkPhos  72  01-15    RECENT CULTURES:  PENDING    RADIOLOGY & ADDITIONAL STUDIES:  EXAM:  XR CHEST PORTABLE URGENT 1V                          PROCEDURE DATE:  01/15/2018        INTERPRETATION:  cough    A frontal chest film  demonstrates grossly clear lungs.  No acute   infiltrate or consolidation is  noted. The costophrenic angles are   grossly clear.    The heart size and vascular markings are within normal limits for   technique.      No mediastinal or hilar adenopathy is suggested. .     The osseous structures appear intact intact.     IMPRESSION:  Clear  lungs.  No acute airspace disease suggested.        Assessment :   62 years old female with past medical history of Asthma, Chronic Bronchitis, admitted with   Influenza A   Asthma exacerbation      Plan :   Tamiflu x 5 day  Steroid and nebs per pulm  Pulm toileting  Increase activity    Continue with present regiment.  Appropriate use of antibiotics and adverse effects reviewed.      I have discussed the above plan of care with patient in detail. She expressed understanding of the treatment plan . Risks, benefits and alternatives discussed in detail.   I have asked if she has any questions or concerns and appropriately addressed them to the best of my ability .      > 45 minutes spent in direct patient care reviewing  the notes, lab data/ imaging , discussion with multidisciplinary team. All questions were addressed and answered to the best of my capacity .    Thank you for allowing me to participate in the care of your patient .      She Coffman MD  Infectious Disease  [phone]

## 2018-01-16 NOTE — PROGRESS NOTE ADULT - SUBJECTIVE AND OBJECTIVE BOX
PULMONARY/CRITICAL CARE      INTERVAL HPI/OVERNIGHT EVENTS: Pt. wheezing, low grade temps. Congested.    62y FemaleHPI:  62 years old female with past medical history of Asthma, Cervical Disc Displacement, Chronic Bronchitis, Depression, GERD (Gastroesophageal Reflux Disease), Hyperlipidemia, Hypothyroidism, Osteopenia and spinal stenosis, who has been having increasing shortness of breath and cough for last 3-4 days. Patient also c/o fevers and body aches. Patient was seen by Dr. Mcwilliams in office today and received IM Steroids and DuoNeb. Patient continued to have shortness of breath so she came in to ER. She is c/o cough, which is not productive of sputum.     Patient is being admitted for further care, as she failed out patient treatment.     Patient denies any orthopnea or PND.   + Fever.   No dizziness or syncope. (15 Alonso 2018 16:42)        PAST MEDICAL & SURGICAL HISTORY:  Depression  Hypothyroidism  Spinal stenosis  GERD (Gastroesophageal Reflux Disease)  Chronic Bronchitis  Asthma: trigger getting a cold. hospitalized multipl  times last 2007 denies intubation  Hyperlipidemia  Osteopenia  Cervical Disc Displacement: current diagnosis  Cervical vertebral fusion  S/P Colonoscopy: 2005  wnl  S/P Foot Surgery: ORIF left foot  S/P Cholecystectomy: open   1989  Kyphosis  Termination of Pregnancy: x3  remote        ICU Vital Signs Last 24 Hrs  T(C): 36.4 (16 Jan 2018 06:32), Max: 37.9 (16 Jan 2018 02:07)  T(F): 97.6 (16 Jan 2018 06:32), Max: 100.3 (16 Jan 2018 02:07)  HR: 78 (16 Jan 2018 08:30) (64 - 91)  BP: 120/59 (16 Jan 2018 06:32) (119/54 - 164/83)  BP(mean): --  ABP: --  ABP(mean): --  RR: 16 (16 Jan 2018 06:32) (14 - 22)  SpO2: 97% (16 Jan 2018 08:30) (66% - 100%)    Qtts:     I&O's Summary          REVIEW OF SYSTEMS:    CONSTITUTIONAL: No fever, weight loss, or fatigue  EYES: No eye pain, visual disturbances, or discharge  ENMT:  No difficulty hearing, tinnitus, vertigo; No sinus or throat pain  NECK: No pain or stiffness  BREASTS: No pain, masses, or nipple discharge  RESPIRATORY: has cough, wheezing, chills; no hemoptysis; moderate shortness of breath  CARDIOVASCULAR: No chest pain, palpitations, dizziness, or leg swelling  GASTROINTESTINAL: No abdominal or epigastric pain. No nausea, vomiting, or hematemesis; No diarrhea or constipation. No melena or hematochezia.  GENITOURINARY: No dysuria, frequency, hematuria, or incontinence  NEUROLOGICAL: No headaches, memory loss, loss of strength, numbness, or tremors  SKIN: No itching, burning, rashes, or lesions   LYMPH NODES: No enlarged glands  ENDOCRINE: No heat or cold intolerance; No hair loss  MUSCULOSKELETAL: No joint pain or swelling; No muscle, back, or extremity pain, no calf tenderness  PSYCHIATRIC: No depression, anxiety, mood swings, or difficulty sleeping  HEME/LYMPH: No easy bruising, or bleeding gums  ALLERGY AND IMMUNOLOGIC: No hives or eczema      PHYSICAL EXAM:    GENERAL: NAD, well-groomed, well-developed, NAD  HEAD:  Atraumatic, Normocephalic  EYES: EOMI, PERRLA, conjunctiva and sclera clear  ENMT: No tonsillar erythema, exudates, or enlargement; Moist mucous membranes, Good dentition, No lesions  NECK: Supple, No JVD, Normal thyroid  NERVOUS SYSTEM:  Alert & Oriented X3, Good concentration; Motor Strength 5/5 B/L upper and lower extremities  CHEST/LUNG: few bilat rhonchi, wheezing, good excursion, no rubs  HEART: Regular rate and rhythm; No murmurs, rubs, or gallops  ABDOMEN: Soft, Nontender, Nondistended; Bowel sounds present  EXTREMITIES:  2+ Peripheral Pulses, No clubbing, cyanosis, or edema  LYMPH: No lymphadenopathy noted  SKIN: No rashes or lesions        LABS:                        12.3   7.8   )-----------( 337      ( 16 Jan 2018 06:43 )             35.8     01-16    142  |  107  |  11  ----------------------------<  177<H>  4.6   |  26  |  0.81    Ca    8.8      16 Jan 2018 06:43  Mg     1.8     01-16    TPro  7.7  /  Alb  3.5  /  TBili  0.3  /  DBili  x   /  AST  36  /  ALT  36  /  AlkPhos  72  01-15    PT/INR - ( 15 Alonso 2018 16:33 )   PT: 11.9 sec;   INR: 1.09 ratio               vanco through     RADIOLOGY & ADDITIONAL STUDIES:< from: Xray Chest 1 View AP- PORTABLE-Urgent (01.15.18 @ 16:35) >  EXAM:  XR CHEST PORTABLE URGENT 1V                                  PROCEDURE DATE:  01/15/2018          INTERPRETATION:  cough    A frontal chest film  demonstrates grossly clear lungs.  No acute   infiltrate or consolidation is  noted. The costophrenic angles are   grossly clear.    The heart size and vascular markings are within normal limits for   technique.      No mediastinal or hilar adenopathy is suggested. .     The osseous structures appear intact intact.     IMPRESSION:  Clear  lungs.  No acute airspace disease suggested.                  RADHA GALVEZ M.D., ATTENDING RADIOLOGIST  This document has been electronically signed. Alonso 15 2018  4:44P    < end of copied text >        CRITICAL CARE TIME SPENT:

## 2018-01-17 PROCEDURE — 93306 TTE W/DOPPLER COMPLETE: CPT | Mod: 26

## 2018-01-17 RX ADMIN — Medication 100 MILLIGRAM(S): at 13:45

## 2018-01-17 RX ADMIN — POLYETHYLENE GLYCOL 3350 17 GRAM(S): 17 POWDER, FOR SOLUTION ORAL at 17:26

## 2018-01-17 RX ADMIN — GABAPENTIN 600 MILLIGRAM(S): 400 CAPSULE ORAL at 13:45

## 2018-01-17 RX ADMIN — ENOXAPARIN SODIUM 40 MILLIGRAM(S): 100 INJECTION SUBCUTANEOUS at 17:26

## 2018-01-17 RX ADMIN — BUDESONIDE AND FORMOTEROL FUMARATE DIHYDRATE 2 PUFF(S): 160; 4.5 AEROSOL RESPIRATORY (INHALATION) at 18:45

## 2018-01-17 RX ADMIN — GABAPENTIN 600 MILLIGRAM(S): 400 CAPSULE ORAL at 06:11

## 2018-01-17 RX ADMIN — SENNA PLUS 2 TABLET(S): 8.6 TABLET ORAL at 21:24

## 2018-01-17 RX ADMIN — Medication 40 MILLIGRAM(S): at 21:23

## 2018-01-17 RX ADMIN — PANTOPRAZOLE SODIUM 40 MILLIGRAM(S): 20 TABLET, DELAYED RELEASE ORAL at 06:11

## 2018-01-17 RX ADMIN — Medication 75 MILLIGRAM(S): at 17:26

## 2018-01-17 RX ADMIN — Medication 3 MILLILITER(S): at 08:00

## 2018-01-17 RX ADMIN — Medication 200 MILLIGRAM(S): at 17:26

## 2018-01-17 RX ADMIN — Medication 3 MILLILITER(S): at 00:37

## 2018-01-17 RX ADMIN — Medication 200 MILLIGRAM(S): at 11:29

## 2018-01-17 RX ADMIN — TRAMADOL HYDROCHLORIDE 100 MILLIGRAM(S): 50 TABLET ORAL at 14:01

## 2018-01-17 RX ADMIN — POLYETHYLENE GLYCOL 3350 17 GRAM(S): 17 POWDER, FOR SOLUTION ORAL at 06:11

## 2018-01-17 RX ADMIN — TRAMADOL HYDROCHLORIDE 100 MILLIGRAM(S): 50 TABLET ORAL at 13:53

## 2018-01-17 RX ADMIN — BUDESONIDE AND FORMOTEROL FUMARATE DIHYDRATE 2 PUFF(S): 160; 4.5 AEROSOL RESPIRATORY (INHALATION) at 06:11

## 2018-01-17 RX ADMIN — Medication 75 MILLIGRAM(S): at 06:11

## 2018-01-17 RX ADMIN — Medication 40 MILLIGRAM(S): at 13:45

## 2018-01-17 RX ADMIN — Medication 100 MILLIGRAM(S): at 21:24

## 2018-01-17 RX ADMIN — Medication 50 MICROGRAM(S): at 06:11

## 2018-01-17 RX ADMIN — ESCITALOPRAM OXALATE 10 MILLIGRAM(S): 10 TABLET, FILM COATED ORAL at 11:29

## 2018-01-17 RX ADMIN — Medication 10 MILLIGRAM(S): at 06:47

## 2018-01-17 RX ADMIN — ZOLPIDEM TARTRATE 5 MILLIGRAM(S): 10 TABLET ORAL at 00:37

## 2018-01-17 RX ADMIN — Medication 3 MILLILITER(S): at 18:56

## 2018-01-17 RX ADMIN — Medication 100 MILLIGRAM(S): at 06:11

## 2018-01-17 RX ADMIN — Medication 3 MILLILITER(S): at 13:52

## 2018-01-17 RX ADMIN — Medication 40 MILLIGRAM(S): at 06:05

## 2018-01-17 RX ADMIN — MAGNESIUM HYDROXIDE 30 MILLILITER(S): 400 TABLET, CHEWABLE ORAL at 08:14

## 2018-01-17 RX ADMIN — GABAPENTIN 600 MILLIGRAM(S): 400 CAPSULE ORAL at 21:24

## 2018-01-17 RX ADMIN — ATORVASTATIN CALCIUM 80 MILLIGRAM(S): 80 TABLET, FILM COATED ORAL at 21:24

## 2018-01-17 RX ADMIN — TIOTROPIUM BROMIDE 1 CAPSULE(S): 18 CAPSULE ORAL; RESPIRATORY (INHALATION) at 06:12

## 2018-01-17 RX ADMIN — MONTELUKAST 10 MILLIGRAM(S): 4 TABLET, CHEWABLE ORAL at 21:24

## 2018-01-17 NOTE — PROGRESS NOTE ADULT - SUBJECTIVE AND OBJECTIVE BOX
PULMONARY/CRITICAL CARE      INTERVAL HPI/OVERNIGHT EVENTS:  Still coughing, wheezing. Mild sob. ambulated. No fever.    62y FemaleHPI:  62 years old female with past medical history of Asthma, Cervical Disc Displacement, Chronic Bronchitis, Depression, GERD (Gastroesophageal Reflux Disease), Hyperlipidemia, Hypothyroidism, Osteopenia and spinal stenosis, who has been having increasing shortness of breath and cough for last 3-4 days. Patient also c/o fevers and body aches. Patient was seen by Dr. Mcwilliams in office today and received IM Steroids and DuoNeb. Patient continued to have shortness of breath so she came in to ER. She is c/o cough, which is not productive of sputum.     Patient is being admitted for further care, as she failed out patient treatment.     Patient denies any orthopnea or PND.   + Fever.   No dizziness or syncope. (15 Alonso 2018 16:42)        PAST MEDICAL & SURGICAL HISTORY:  Depression  Hypothyroidism  Spinal stenosis  GERD (Gastroesophageal Reflux Disease)  Chronic Bronchitis  Asthma: trigger getting a cold. hospitalized multipl  times last 2007 denies intubation  Hyperlipidemia  Osteopenia  Cervical Disc Displacement: current diagnosis  Cervical vertebral fusion  S/P Colonoscopy: 2005  wnl  S/P Foot Surgery: ORIF left foot  S/P Cholecystectomy: open   1989  Kyphosis  Termination of Pregnancy: x3  remote        ICU Vital Signs Last 24 Hrs  T(C): 36.8 (17 Jan 2018 05:20), Max: 37.1 (16 Jan 2018 15:48)  T(F): 98.3 (17 Jan 2018 05:20), Max: 98.8 (16 Jan 2018 15:48)  HR: 81 (17 Jan 2018 05:20) (69 - 90)  BP: 157/73 (17 Jan 2018 05:20) (121/74 - 167/57)  BP(mean): --  ABP: --  ABP(mean): --  RR: 18 (17 Jan 2018 05:20) (16 - 18)  SpO2: 94% (17 Jan 2018 05:20) (94% - 98%)    Qtts:     I&O's Summary          REVIEW OF SYSTEMS:    CONSTITUTIONAL: No fever, weight loss, or fatigue  EYES: No eye pain, visual disturbances, or discharge  ENMT:  No difficulty hearing, tinnitus, vertigo; No sinus or throat pain  NECK: No pain or stiffness  BREASTS: No pain, masses, or nipple discharge  RESPIRATORY: has cough, wheezing, No chills or hemoptysis; mild shortness of breath  CARDIOVASCULAR: No chest pain, palpitations, dizziness, or leg swelling  GASTROINTESTINAL: No abdominal or epigastric pain. No nausea, vomiting, or hematemesis; No diarrhea or constipation. No melena or hematochezia.  GENITOURINARY: No dysuria, frequency, hematuria, or incontinence  NEUROLOGICAL: No headaches, memory loss, loss of strength, numbness, or tremors  SKIN: No itching, burning, rashes, or lesions   LYMPH NODES: No enlarged glands  ENDOCRINE: No heat or cold intolerance; No hair loss  MUSCULOSKELETAL: No joint pain or swelling; No muscle, back, or extremity pain, no calf tenderness  PSYCHIATRIC: No depression, anxiety, mood swings, or difficulty sleeping  HEME/LYMPH: No easy bruising, or bleeding gums  ALLERGY AND IMMUNOLOGIC: No hives or eczema      PHYSICAL EXAM:    GENERAL: NAD, well-groomed, well-developed, NAD  HEAD:  Atraumatic, Normocephalic  EYES: EOMI, PERRLA, conjunctiva and sclera clear  ENMT: No tonsillar erythema, exudates, or enlargement; Moist mucous membranes, Good dentition, No lesions  NECK: Supple, No JVD, Normal thyroid  NERVOUS SYSTEM:  Alert & Oriented X3, Good concentration; Motor Strength 5/5 B/L upper and lower extremities  CHEST/LUNG: few bilat rhonchi, wheezing,  Good excursion, no rubs  HEART: Regular rate and rhythm; No murmurs, rubs, or gallops  ABDOMEN: Soft, Nontender, Nondistended; Bowel sounds present  EXTREMITIES:  2+ Peripheral Pulses, No clubbing, cyanosis, or edema  LYMPH: No lymphadenopathy noted  SKIN: No rashes or lesions        LABS:                        12.3   7.8   )-----------( 337      ( 16 Jan 2018 06:43 )             35.8     01-16    142  |  107  |  11  ----------------------------<  177<H>  4.6   |  26  |  0.81    Ca    8.8      16 Jan 2018 06:43  Mg     1.8     01-16    TPro  7.7  /  Alb  3.5  /  TBili  0.3  /  DBili  x   /  AST  36  /  ALT  36  /  AlkPhos  72  01-15    PT/INR - ( 15 Alonso 2018 16:33 )   PT: 11.9 sec;   INR: 1.09 ratio               vanco through     RADIOLOGY & ADDITIONAL STUDIES:  < from: Xray Chest 1 View AP- PORTABLE-Urgent (01.15.18 @ 16:35) >  EXAM:  XR CHEST PORTABLE URGENT 1V                                  PROCEDURE DATE:  01/15/2018          INTERPRETATION:  cough    A frontal chest film  demonstrates grossly clear lungs.  No acute   infiltrate or consolidation is  noted. The costophrenic angles are   grossly clear.    The heart size and vascular markings are within normal limits for   technique.      No mediastinal or hilar adenopathy is suggested. .     The osseous structures appear intact intact.     IMPRESSION:  Clear  lungs.  No acute airspace disease suggested.                  < end of copied text >      CRITICAL CARE TIME SPENT:

## 2018-01-17 NOTE — PROGRESS NOTE ADULT - SUBJECTIVE AND OBJECTIVE BOX
Patient is a 62y old  Female who presents with a chief complaint of flu (15 Alonso 2018 18:32)      INTERVAL HPI/OVERNIGHT EVENTS: Patient seen and examined. NAD. Still c/o SOB, cough and wheezing.    Vital Signs Last 24 Hrs  T(C): 36.7 (17 Jan 2018 10:06), Max: 37.1 (16 Jan 2018 15:48)  T(F): 98 (17 Jan 2018 10:06), Max: 98.8 (16 Jan 2018 15:48)  HR: 86 (17 Jan 2018 13:52) (69 - 96)  BP: 118/70 (17 Jan 2018 10:06) (118/70 - 167/57)  BP(mean): --  RR: 18 (17 Jan 2018 10:06) (16 - 18)  SpO2: 96% (17 Jan 2018 10:06) (94% - 98%)    01-16    142  |  107  |  11  ----------------------------<  177<H>  4.6   |  26  |  0.81    Ca    8.8      16 Jan 2018 06:43  Mg     1.8     01-16    TPro  7.7  /  Alb  3.5  /  TBili  0.3  /  DBili  x   /  AST  36  /  ALT  36  /  AlkPhos  72  01-15                          12.3   7.8   )-----------( 337      ( 16 Jan 2018 06:43 )             35.8     PT/INR - ( 15 Alonso 2018 16:33 )   PT: 11.9 sec;   INR: 1.09 ratio           CAPILLARY BLOOD GLUCOSE                  acetaminophen   Tablet. 650 milliGRAM(s) Oral every 6 hours PRN  ALBUTerol    90 MICROgram(s) HFA Inhaler 2 Puff(s) Inhalation every 6 hours PRN  ALBUTerol/ipratropium for Nebulization 3 milliLiter(s) Nebulizer every 6 hours  ALBUTerol/ipratropium for Nebulization 3 milliLiter(s) Nebulizer every 4 hours PRN  atorvastatin 80 milliGRAM(s) Oral at bedtime  benzonatate 100 milliGRAM(s) Oral every 8 hours PRN  bisacodyl Suppository 10 milliGRAM(s) Rectal daily PRN  buDESOnide 160 MICROgram(s)/formoterol 4.5 MICROgram(s) Inhaler 2 Puff(s) Inhalation two times a day  docusate sodium 100 milliGRAM(s) Oral three times a day  enoxaparin Injectable 40 milliGRAM(s) SubCutaneous every 24 hours  escitalopram 10 milliGRAM(s) Oral daily  gabapentin 600 milliGRAM(s) Oral three times a day  guaiFENesin    Syrup 200 milliGRAM(s) Oral every 6 hours  levothyroxine 50 MICROGram(s) Oral daily  magnesium hydroxide Suspension 30 milliLiter(s) Oral daily PRN  methylPREDNISolone sodium succinate Injectable 40 milliGRAM(s) IV Push every 8 hours  montelukast 10 milliGRAM(s) Oral at bedtime  oseltamivir 75 milliGRAM(s) Oral two times a day  pantoprazole    Tablet 40 milliGRAM(s) Oral before breakfast  polyethylene glycol 3350 17 Gram(s) Oral two times a day  senna 2 Tablet(s) Oral at bedtime  tiotropium 18 MICROgram(s) Capsule 1 Capsule(s) Inhalation daily  traMADol 50 milliGRAM(s) Oral every 6 hours PRN  traMADol 100 milliGRAM(s) Oral every 6 hours PRN  zolpidem 5 milliGRAM(s) Oral at bedtime PRN      REVIEW OF SYSTEMS:  CONSTITUTIONAL: No fever, no weight loss, or no fatigue  NECK: No pain, no stiffness  RESPIRATORY: + cough, + wheezing, no chills, no hemoptysis, + shortness of breath  CARDIOVASCULAR: No chest pain, no palpitations, no dizziness, no leg swelling  GASTROINTESTINAL: No abdominal pain. No nausea, no vomiting, no hematemesis; No diarrhea, no constipation. No melena, no hematochezia.  GENITOURINARY: No dysuria, no frequency, no hematuria, no incontinence  NEUROLOGICAL: No headaches, no loss of strength, no numbness, no tremors  SKIN: No itching, no burning  MUSCULOSKELETAL: No joint pain, no swelling; No muscle, no back, no extremity pain  PSYCHIATRIC: No depression, no mood swings,   HEME/LYMPH: No easy bruising, no bleeding gums  ALLERY AND IMMUNOLOGIC: No hives       Consultant(s) Notes Reviewed:  [ x] YES  [ ] NO    PHYSICAL EXAM:  GENERAL: NAD  HEAD:  Atraumatic, Normocephalic  EYES: EOMI, PERRLA, conjunctiva and sclera clear  ENMT: No tonsillar erythema, exudates, or enlargement; Moist mucous membranes  NECK: Supple, No JVD  NERVOUS SYSTEM:  Awake & alert  CHEST/LUNG: b/l wheezing  HEART: Regular rate and rhythm  ABDOMEN: Soft, Nontender, Nondistended; Bowel sounds present  EXTREMITIES:  No clubbing, cyanosis, or edema  LYMPH: No lymphadenopathy noted  SKIN: No rashes      Advanced care planning discussed with patient/family [x ] YES   [ ] NO    Advanced care planning discussed with patient/family. Advanced care planning forms reviewed/discussed/completed. 20 minutes spent.

## 2018-01-17 NOTE — PROGRESS NOTE ADULT - SUBJECTIVE AND OBJECTIVE BOX
TEENA CORBIN is a 62yFemale , patient examined and chart reviewed.     INTERVAL HPI/ OVERNIGHT EVENTS:   Awake Alert.   Afebrile.     PAST MEDICAL & SURGICAL HISTORY:  Depression  Hypothyroidism  Spinal stenosis  GERD (Gastroesophageal Reflux Disease)  Chronic Bronchitis  Asthma: trigger getting a cold. hospitalized multipl  times last 2007 denies intubation  Hyperlipidemia  Osteopenia  Cervical Disc Displacement: current diagnosis  Cervical vertebral fusion  S/P Colonoscopy: 2005  wnl  S/P Foot Surgery: ORIF left foot  S/P Cholecystectomy: open   1989  Kyphosis  Termination of Pregnancy: x3  remote      For details regarding the patient's social history, family history, and other miscellaneous elements, please refer the initial infectious diseases consultation and/or the admitting history and physical examination for this admission.    ROS:  CONSTITUTIONAL:  Negative fever or chills  EYES:  Negative  blurry vision or double vision  CARDIOVASCULAR:  Negative for chest pain or palpitations  RESPIRATORY:  + for cough, wheezing, No SOB   GASTROINTESTINAL:  Negative for nausea, vomiting, diarrhea, constipation, or abdominal pain  GENITOURINARY:  Negative frequency, urgency or dysuria  NEUROLOGIC:  No headache, confusion, dizziness, lightheadedness  All other systems were reviewed and are negative     Current inpatient medications :    ANTIBIOTICS/RELEVANT:  oseltamivir 75 milliGRAM(s) Oral two times a day    acetaminophen   Tablet. 650 milliGRAM(s) Oral every 6 hours PRN  ALBUTerol    90 MICROgram(s) HFA Inhaler 2 Puff(s) Inhalation every 6 hours PRN  ALBUTerol/ipratropium for Nebulization 3 milliLiter(s) Nebulizer every 6 hours  ALBUTerol/ipratropium for Nebulization 3 milliLiter(s) Nebulizer every 4 hours PRN  atorvastatin 80 milliGRAM(s) Oral at bedtime  benzonatate 100 milliGRAM(s) Oral every 8 hours PRN  bisacodyl Suppository 10 milliGRAM(s) Rectal daily PRN  buDESOnide 160 MICROgram(s)/formoterol 4.5 MICROgram(s) Inhaler 2 Puff(s) Inhalation two times a day  docusate sodium 100 milliGRAM(s) Oral three times a day  enoxaparin Injectable 40 milliGRAM(s) SubCutaneous every 24 hours  escitalopram 10 milliGRAM(s) Oral daily  gabapentin 600 milliGRAM(s) Oral three times a day  guaiFENesin    Syrup 200 milliGRAM(s) Oral every 6 hours  levothyroxine 50 MICROGram(s) Oral daily  magnesium hydroxide Suspension 30 milliLiter(s) Oral daily PRN  methylPREDNISolone sodium succinate Injectable 40 milliGRAM(s) IV Push every 8 hours  montelukast 10 milliGRAM(s) Oral at bedtime  pantoprazole    Tablet 40 milliGRAM(s) Oral before breakfast  polyethylene glycol 3350 17 Gram(s) Oral two times a day  senna 2 Tablet(s) Oral at bedtime  tiotropium 18 MICROgram(s) Capsule 1 Capsule(s) Inhalation daily  traMADol 50 milliGRAM(s) Oral every 6 hours PRN  traMADol 100 milliGRAM(s) Oral every 6 hours PRN  zolpidem 5 milliGRAM(s) Oral at bedtime PRN      Objective:    T(C): 36.7 (01-17-18 @ 10:06), Max: 37.1 (01-16-18 @ 15:48)  HR: 96 (01-17-18 @ 10:06) (69 - 96)  BP: 118/70 (01-17-18 @ 10:06) (118/70 - 167/57)  RR: 18 (01-17-18 @ 10:06) (16 - 18)  SpO2: 96% (01-17-18 @ 10:06) (94% - 98%)  Wt(kg): --      Physical Exam:  General: well developed well nourished, in no acute distress  Eyes: sclera anicteric, pupils equal and reactive to light  ENMT: buccal mucosa moist, pharynx not injected  Neck: supple, trachea midline  Lungs: +wheeze/rhonchi  Cardiovascular: regular rate and rhythm, S1 S2  Abdomen: soft, nontender, no organomegaly present, bowel sounds normal  Neurological: alert and oriented x3, Cranial Nerves II-XII grossly intact  Skin: no increased ecchymosis/petechiae/purpura  Lymph Nodes: no palpable cervical/supraclavicular lymph nodes enlargements  Extremities: no cyanosis/clubbing/edema      LABS:                          12.3   7.8   )-----------( 337      ( 16 Jan 2018 06:43 )             35.8       01-16    142  |  107  |  11  ----------------------------<  177<H>  4.6   |  26  |  0.81    Ca    8.8      16 Jan 2018 06:43  Mg     1.8     01-16    TPro  7.7  /  Alb  3.5  /  TBili  0.3  /  DBili  x   /  AST  36  /  ALT  36  /  AlkPhos  72  01-15      PT/INR - ( 15 Alonso 2018 16:33 )   PT: 11.9 sec;   INR: 1.09 ratio      MICROBIOLOGY:  Influenza Virus A and B Rapid Antigen (01.15.18 @ 17:09)    Influenza A Rapid Screen: Positive: Interpretation of Influenza virus Type A - Antigen Enzyme Immunoassay:  This test screens for Influenza A.  A negative result does not eliminate  the possibility of infection with influenza A.  Depending upon the type  and adequacy of the specimen collected, the specificity of the assay  ranges from % and the sensitivity from 20-70%.    Influenza B Rapid Screen: Negative: Interpretation of Influenza Virus Type B - Antigen Enzyme Immunoassay:  This test screens for Influenza B.  A negative result does not eliminate  the possibility of infection with influenza B. Depending upon the type  and adequacy of the specimen collected, the specificty of the assay  ranges from % and the sensitivity from 20-70%.      Culture - Blood (collected 16 Jan 2018 00:16)  Source: .Blood Blood-Peripheral  Preliminary Report (17 Jan 2018 01:02):    No growth to date.    Culture - Blood (collected 16 Jan 2018 00:16)  Source: .Blood Blood-Peripheral  Preliminary Report (17 Jan 2018 01:02):    No growth to date.    RADIOLOGY & ADDITIONAL STUDIES:    EXAM:  XR CHEST PORTABLE URGENT 1V                                  PROCEDURE DATE:  01/15/2018          INTERPRETATION:  cough    A frontal chest film  demonstrates grossly clear lungs.  No acute   infiltrate or consolidation is  noted. The costophrenic angles are   grossly clear.    The heart size and vascular markings are within normal limits for   technique.      No mediastinal or hilar adenopathy is suggested. .     The osseous structures appear intact intact.     IMPRESSION:  Clear  lungs.  No acute airspace disease suggested.    Assessment :  62 years old female with past medical history of Asthma, Chronic Bronchitis, admitted with   Influenza A   Asthma exacerbation    Plan :   Tamiflu x 5 day  Steroid and nebs per pulm  Pulm toileting  Increase activity    Continue with present regiment.  Appropriate use of antibiotics and adverse effects reviewed.      I have discussed the above plan of care with patient in detail. She expressed understanding of the  treatment plan . Risks, benefits and alternatives discussed in detail.   I have asked if she has any questions or concerns and appropriately addressed them to the best of my ability .    > 35 minutes were spent in direct patient care reviewing notes, medications ,labs data/ imaging , discussion with multidisciplinary team.    Thank you for allowing me to participate in care of your patient .    She Coffman MD  Infectious Disease  [phone]

## 2018-01-18 LAB
ANION GAP SERPL CALC-SCNC: 8 MMOL/L — SIGNIFICANT CHANGE UP (ref 5–17)
BUN SERPL-MCNC: 16 MG/DL — SIGNIFICANT CHANGE UP (ref 7–23)
CALCIUM SERPL-MCNC: 9 MG/DL — SIGNIFICANT CHANGE UP (ref 8.4–10.5)
CHLORIDE SERPL-SCNC: 103 MMOL/L — SIGNIFICANT CHANGE UP (ref 96–108)
CO2 SERPL-SCNC: 28 MMOL/L — SIGNIFICANT CHANGE UP (ref 22–31)
CREAT SERPL-MCNC: 0.78 MG/DL — SIGNIFICANT CHANGE UP (ref 0.5–1.3)
GLUCOSE SERPL-MCNC: 127 MG/DL — HIGH (ref 70–99)
HCT VFR BLD CALC: 36.2 % — SIGNIFICANT CHANGE UP (ref 34.5–45)
HGB BLD-MCNC: 11.6 G/DL — SIGNIFICANT CHANGE UP (ref 11.5–15.5)
MAGNESIUM SERPL-MCNC: 1.8 MG/DL — SIGNIFICANT CHANGE UP (ref 1.6–2.6)
MCHC RBC-ENTMCNC: 29.3 PG — SIGNIFICANT CHANGE UP (ref 27–34)
MCHC RBC-ENTMCNC: 31.9 GM/DL — LOW (ref 32–36)
MCV RBC AUTO: 91.7 FL — SIGNIFICANT CHANGE UP (ref 80–100)
PLATELET # BLD AUTO: 366 K/UL — SIGNIFICANT CHANGE UP (ref 150–400)
POTASSIUM SERPL-MCNC: 4.1 MMOL/L — SIGNIFICANT CHANGE UP (ref 3.5–5.3)
POTASSIUM SERPL-SCNC: 4.1 MMOL/L — SIGNIFICANT CHANGE UP (ref 3.5–5.3)
RBC # BLD: 3.95 M/UL — SIGNIFICANT CHANGE UP (ref 3.8–5.2)
RBC # FLD: 13.5 % — SIGNIFICANT CHANGE UP (ref 10.3–14.5)
SODIUM SERPL-SCNC: 139 MMOL/L — SIGNIFICANT CHANGE UP (ref 135–145)
WBC # BLD: 15.1 K/UL — HIGH (ref 3.8–10.5)
WBC # FLD AUTO: 15.1 K/UL — HIGH (ref 3.8–10.5)

## 2018-01-18 RX ADMIN — Medication 200 MILLIGRAM(S): at 23:30

## 2018-01-18 RX ADMIN — MONTELUKAST 10 MILLIGRAM(S): 4 TABLET, CHEWABLE ORAL at 21:35

## 2018-01-18 RX ADMIN — Medication 3 MILLILITER(S): at 23:29

## 2018-01-18 RX ADMIN — Medication 40 MILLIGRAM(S): at 05:27

## 2018-01-18 RX ADMIN — Medication 200 MILLIGRAM(S): at 00:49

## 2018-01-18 RX ADMIN — SENNA PLUS 2 TABLET(S): 8.6 TABLET ORAL at 21:34

## 2018-01-18 RX ADMIN — GABAPENTIN 600 MILLIGRAM(S): 400 CAPSULE ORAL at 13:27

## 2018-01-18 RX ADMIN — ZOLPIDEM TARTRATE 5 MILLIGRAM(S): 10 TABLET ORAL at 23:29

## 2018-01-18 RX ADMIN — Medication 200 MILLIGRAM(S): at 05:28

## 2018-01-18 RX ADMIN — ESCITALOPRAM OXALATE 10 MILLIGRAM(S): 10 TABLET, FILM COATED ORAL at 11:31

## 2018-01-18 RX ADMIN — Medication 3 MILLILITER(S): at 19:43

## 2018-01-18 RX ADMIN — GABAPENTIN 600 MILLIGRAM(S): 400 CAPSULE ORAL at 05:28

## 2018-01-18 RX ADMIN — PANTOPRAZOLE SODIUM 40 MILLIGRAM(S): 20 TABLET, DELAYED RELEASE ORAL at 05:28

## 2018-01-18 RX ADMIN — ZOLPIDEM TARTRATE 5 MILLIGRAM(S): 10 TABLET ORAL at 00:49

## 2018-01-18 RX ADMIN — Medication 3 MILLILITER(S): at 13:40

## 2018-01-18 RX ADMIN — Medication 3 MILLILITER(S): at 07:52

## 2018-01-18 RX ADMIN — Medication 200 MILLIGRAM(S): at 11:31

## 2018-01-18 RX ADMIN — ENOXAPARIN SODIUM 40 MILLIGRAM(S): 100 INJECTION SUBCUTANEOUS at 17:34

## 2018-01-18 RX ADMIN — GABAPENTIN 600 MILLIGRAM(S): 400 CAPSULE ORAL at 21:34

## 2018-01-18 RX ADMIN — Medication 100 MILLIGRAM(S): at 05:28

## 2018-01-18 RX ADMIN — Medication 3 MILLILITER(S): at 00:37

## 2018-01-18 RX ADMIN — TRAMADOL HYDROCHLORIDE 50 MILLIGRAM(S): 50 TABLET ORAL at 21:43

## 2018-01-18 RX ADMIN — BUDESONIDE AND FORMOTEROL FUMARATE DIHYDRATE 2 PUFF(S): 160; 4.5 AEROSOL RESPIRATORY (INHALATION) at 21:43

## 2018-01-18 RX ADMIN — Medication 650 MILLIGRAM(S): at 17:25

## 2018-01-18 RX ADMIN — Medication 75 MILLIGRAM(S): at 05:28

## 2018-01-18 RX ADMIN — Medication 100 MILLIGRAM(S): at 21:34

## 2018-01-18 RX ADMIN — BUDESONIDE AND FORMOTEROL FUMARATE DIHYDRATE 2 PUFF(S): 160; 4.5 AEROSOL RESPIRATORY (INHALATION) at 05:37

## 2018-01-18 RX ADMIN — Medication 75 MILLIGRAM(S): at 17:34

## 2018-01-18 RX ADMIN — ALBUTEROL 2 PUFF(S): 90 AEROSOL, METERED ORAL at 05:40

## 2018-01-18 RX ADMIN — Medication 200 MILLIGRAM(S): at 17:34

## 2018-01-18 RX ADMIN — TIOTROPIUM BROMIDE 1 CAPSULE(S): 18 CAPSULE ORAL; RESPIRATORY (INHALATION) at 05:28

## 2018-01-18 RX ADMIN — POLYETHYLENE GLYCOL 3350 17 GRAM(S): 17 POWDER, FOR SOLUTION ORAL at 17:34

## 2018-01-18 RX ADMIN — Medication 50 MICROGRAM(S): at 05:28

## 2018-01-18 RX ADMIN — ATORVASTATIN CALCIUM 80 MILLIGRAM(S): 80 TABLET, FILM COATED ORAL at 21:34

## 2018-01-18 RX ADMIN — MAGNESIUM HYDROXIDE 30 MILLILITER(S): 400 TABLET, CHEWABLE ORAL at 11:31

## 2018-01-18 RX ADMIN — Medication 100 MILLIGRAM(S): at 13:27

## 2018-01-18 RX ADMIN — TRAMADOL HYDROCHLORIDE 50 MILLIGRAM(S): 50 TABLET ORAL at 22:13

## 2018-01-18 RX ADMIN — Medication 650 MILLIGRAM(S): at 16:34

## 2018-01-18 RX ADMIN — POLYETHYLENE GLYCOL 3350 17 GRAM(S): 17 POWDER, FOR SOLUTION ORAL at 05:28

## 2018-01-18 NOTE — PROGRESS NOTE ADULT - SUBJECTIVE AND OBJECTIVE BOX
PULMONARY/CRITICAL CARE      INTERVAL HPI/OVERNIGHT EVENTS:  Pt. still wheezing alot. Ambulated. Coughing alot. No fever    62y FemaleHPI:  62 years old female with past medical history of Asthma, Cervical Disc Displacement, Chronic Bronchitis, Depression, GERD (Gastroesophageal Reflux Disease), Hyperlipidemia, Hypothyroidism, Osteopenia and spinal stenosis, who has been having increasing shortness of breath and cough for last 3-4 days. Patient also c/o fevers and body aches. Patient was seen by Dr. Mcwilliams in office today and received IM Steroids and DuoNeb. Patient continued to have shortness of breath so she came in to ER. She is c/o cough, which is not productive of sputum.     Patient is being admitted for further care, as she failed out patient treatment.     Patient denies any orthopnea or PND.   + Fever.   No dizziness or syncope. (15 Alonso 2018 16:42)        PAST MEDICAL & SURGICAL HISTORY:  Depression  Hypothyroidism  Spinal stenosis  GERD (Gastroesophageal Reflux Disease)  Chronic Bronchitis  Asthma: trigger getting a cold. hospitalized multipl  times last 2007 denies intubation  Hyperlipidemia  Osteopenia  Cervical Disc Displacement: current diagnosis  Cervical vertebral fusion  S/P Colonoscopy: 2005  wnl  S/P Foot Surgery: ORIF left foot  S/P Cholecystectomy: open   1989  Kyphosis  Termination of Pregnancy: x3  remote        ICU Vital Signs Last 24 Hrs  T(C): 36.9 (18 Jan 2018 05:13), Max: 36.9 (17 Jan 2018 14:47)  T(F): 98.5 (18 Jan 2018 05:13), Max: 98.5 (18 Jan 2018 05:13)  HR: 65 (18 Jan 2018 05:13) (65 - 96)  BP: 133/74 (18 Jan 2018 05:13) (118/70 - 161/79)  BP(mean): --  ABP: --  ABP(mean): --  RR: 18 (18 Jan 2018 05:13) (18 - 20)  SpO2: 98% (18 Jan 2018 05:13) (95% - 98%)    Qtts:     I&O's Summary    17 Jan 2018 07:01  -  18 Jan 2018 07:00  --------------------------------------------------------  IN: 300 mL / OUT: 0 mL / NET: 300 mL            REVIEW OF SYSTEMS:    CONSTITUTIONAL: No fever, weight loss, or fatigue  EYES: No eye pain, visual disturbances, or discharge  ENMT:  No difficulty hearing, tinnitus, vertigo; No sinus or throat pain  NECK: No pain or stiffness  BREASTS: No pain, masses, or nipple discharge  RESPIRATORY: has cough, wheezing, No chills or hemoptysis; mild shortness of breath  CARDIOVASCULAR: No chest pain, palpitations, dizziness, or leg swelling  GASTROINTESTINAL: No abdominal or epigastric pain. No nausea, vomiting, or hematemesis; No diarrhea or constipation. No melena or hematochezia.  GENITOURINARY: No dysuria, frequency, hematuria, or incontinence  NEUROLOGICAL: No headaches, memory loss, loss of strength, numbness, or tremors  SKIN: No itching, burning, rashes, or lesions   LYMPH NODES: No enlarged glands  ENDOCRINE: No heat or cold intolerance; No hair loss  MUSCULOSKELETAL: No joint pain or swelling; No muscle, back, or extremity pain, no calf tenderness  PSYCHIATRIC: No depression, anxiety, mood swings, or difficulty sleeping  HEME/LYMPH: No easy bruising, or bleeding gums  ALLERGY AND IMMUNOLOGIC: No hives or eczema      PHYSICAL EXAM:    GENERAL: NAD, well-groomed, well-developed, NAD  HEAD:  Atraumatic, Normocephalic  EYES: EOMI, PERRLA, conjunctiva and sclera clear  ENMT: No tonsillar erythema, exudates, or enlargement; Moist mucous membranes, Good dentition, No lesions  NECK: Supple, No JVD, Normal thyroid  NERVOUS SYSTEM:  Alert & Oriented X3, Good concentration; Motor Strength 5/5 B/L upper and lower extremities  CHEST/LUNG: few bilat rhonchi, wheezing,  Good excursion, no rubs  HEART: Regular rate and rhythm; No murmurs, rubs, or gallops  ABDOMEN: Soft, Nontender, Nondistended; Bowel sounds present  EXTREMITIES:  2+ Peripheral Pulses, No clubbing, cyanosis, or edema  LYMPH: No lymphadenopathy noted  SKIN: No rashes or lesions        LABS:                        11.6   15.1  )-----------( 366      ( 18 Jan 2018 07:10 )             36.2                 vanco through     RADIOLOGY & ADDITIONAL STUDIES:      CRITICAL CARE TIME SPENT: PULMONARY/CRITICAL CARE      INTERVAL HPI/OVERNIGHT EVENTS:  Pt. still wheezing alot. Ambulated. Coughing alot. No fever    62y FemaleHPI:  62 years old female with past medical history of Asthma, Cervical Disc Displacement, Chronic Bronchitis, Depression, GERD (Gastroesophageal Reflux Disease), Hyperlipidemia, Hypothyroidism, Osteopenia and spinal stenosis, who has been having increasing shortness of breath and cough for last 3-4 days. Patient also c/o fevers and body aches. Patient was seen by Dr. Mcwilliams in office today and received IM Steroids and DuoNeb. Patient continued to have shortness of breath so she came in to ER. She is c/o cough, which is not productive of sputum.     Patient is being admitted for further care, as she failed out patient treatment.     Patient denies any orthopnea or PND.   + Fever.   No dizziness or syncope. (15 Alonso 2018 16:42)        PAST MEDICAL & SURGICAL HISTORY:  Depression  Hypothyroidism  Spinal stenosis  GERD (Gastroesophageal Reflux Disease)  Chronic Bronchitis  Asthma: trigger getting a cold. hospitalized multipl  times last 2007 denies intubation  Hyperlipidemia  Osteopenia  Cervical Disc Displacement: current diagnosis  Cervical vertebral fusion  S/P Colonoscopy: 2005  wnl  S/P Foot Surgery: ORIF left foot  S/P Cholecystectomy: open   1989  Kyphosis  Termination of Pregnancy: x3  remote        ICU Vital Signs Last 24 Hrs  T(C): 36.9 (18 Jan 2018 05:13), Max: 36.9 (17 Jan 2018 14:47)  T(F): 98.5 (18 Jan 2018 05:13), Max: 98.5 (18 Jan 2018 05:13)  HR: 65 (18 Jan 2018 05:13) (65 - 96)  BP: 133/74 (18 Jan 2018 05:13) (118/70 - 161/79)  BP(mean): --  ABP: --  ABP(mean): --  RR: 18 (18 Jan 2018 05:13) (18 - 20)  SpO2: 98% (18 Jan 2018 05:13) (95% - 98%)    Qtts:     I&O's Summary    17 Jan 2018 07:01  -  18 Jan 2018 07:00  --------------------------------------------------------  IN: 300 mL / OUT: 0 mL / NET: 300 mL            REVIEW OF SYSTEMS:    CONSTITUTIONAL: No fever, weight loss, or fatigue  EYES: No eye pain, visual disturbances, or discharge  ENMT:  No difficulty hearing, tinnitus, vertigo; No sinus or throat pain  NECK: No pain or stiffness  BREASTS: No pain, masses, or nipple discharge  RESPIRATORY: has cough, wheezing, No chills or hemoptysis; mild shortness of breath  CARDIOVASCULAR: No chest pain, palpitations, dizziness, or leg swelling  GASTROINTESTINAL: No abdominal or epigastric pain. No nausea, vomiting, or hematemesis; No diarrhea or constipation. No melena or hematochezia.  GENITOURINARY: No dysuria, frequency, hematuria, or incontinence  NEUROLOGICAL: No headaches, memory loss, loss of strength, numbness, or tremors  SKIN: No itching, burning, rashes, or lesions   LYMPH NODES: No enlarged glands  ENDOCRINE: No heat or cold intolerance; No hair loss  MUSCULOSKELETAL: No joint pain or swelling; No muscle, back, or extremity pain, no calf tenderness  PSYCHIATRIC: No depression, anxiety, mood swings, or difficulty sleeping  HEME/LYMPH: No easy bruising, or bleeding gums  ALLERGY AND IMMUNOLOGIC: No hives or eczema      PHYSICAL EXAM:    GENERAL: NAD, well-groomed, well-developed, NAD  HEAD:  Atraumatic, Normocephalic  EYES: EOMI, PERRLA, conjunctiva and sclera clear  ENMT: No tonsillar erythema, exudates, or enlargement; Moist mucous membranes, Good dentition, No lesions  NECK: Supple, No JVD, Normal thyroid  NERVOUS SYSTEM:  Alert & Oriented X3, Good concentration; Motor Strength 5/5 B/L upper and lower extremities  CHEST/LUNG: few bilat rhonchi, wheezing,  Good excursion, no rubs  HEART: Regular rate and rhythm; No murmurs, rubs, or gallops  ABDOMEN: Soft, Nontender, Nondistended; Bowel sounds present  EXTREMITIES:  2+ Peripheral Pulses, No clubbing, cyanosis, or edema  LYMPH: No lymphadenopathy noted  SKIN: No rashes or lesions        LABS:                        11.6   15.1  )-----------( 366      ( 18 Jan 2018 07:10 )             36.2                 vanco through     RADIOLOGY & ADDITIONAL STUDIES:< from: US Transthoracic Echocardiogram w/Doppler Complete (01.16.18 @ 09:04) >  EXAM:  US TTE W DOPPLER COMPLETE                                  PROCEDURE DATE:  01/16/2018          INTERPRETATION:  Ordering Physician: MARIO FITCH [id number]    Indication: Shortness of breath    Technician: Joce Alvarez    Study Quality:Fair   A complete echocardiographic study was performed utilizing standard   protocol including spectral and color Doppler in all echocardiographic   windows.    Height: 5 foot 2 inches  Weight: 185 pounds  BSA: 1.85 sq m  Blood Pressure: 120/59    MEASUREMENTS  IVS: 0.94 cm  PWT: 0.90 cm  LA: 4.1 cm  AO: 2.4 cm  LVIDd: 5.0 cm  LVIDs: 2.8 cm    LVEF: Approximately 70%  RVSP: Approximately 30 mmHg  RA Pressure: 3 mmHg  IVC: Normal in size with normal respiratory variation    FINDINGS  Left Ventricle: The left ventricle appears normal in size and wall   thickness. Left ventricular systolic function is normal with ejection   fraction estimated at approximately 70%.  Right Ventricle: Right ventricle is normal in size and systolic function.  Left Atrium: Left atrium is mildly dilated.  Right Atrium: Right atrium appears grossly normal.  Mitral Valve: The mitral valve is thickened without stenosis. Mitral   regurgitation is present, appearing mild in degree by Limited Doppler   evaluation.  Aortic Valve: Aortic valve is thickened without stenosis. Aortic   insufficiency appearing mild - moderate in degree is demonstrated by   Limited Doppler evaluation.  Tricuspid Valve: Mild tricuspid regurgitation is present  Pulmonic Valve: Trace pulmonic insufficiency is present  Diastolic Function: There is mild left ventricular diastolic dysfunction  Pericardium: There is no pericardial effusion      CONCLUSIONS:    1. Left ventricle is normal in size and wall thickness. Left ventricular   ejection fraction is estimated at approximately 70%.  2. Mild left ventricular diastolic dysfunction.  3. Mild left atrial enlargement.  4. Normal right ventricular size and systolic function.  5. Thickened mitral valve without stenosis. There is  mitral   regurgitation appearing mild in degree by limited Doppler study  6. Thickened aortic valve without stenosis. Aortic insufficiency   appearing mild to moderate in degree by limited Doppler evaluation  7. Mild tricuspid regurgitation.  8. Trace pulmonic insufficiency.                  JACK MANTILLA M.D., ATTENDING CARDIOLOGIST  This document has been electronically signed. Jan 17 2018  8:28AM              < end of copied text >        CRITICAL CARE TIME SPENT:

## 2018-01-18 NOTE — PROGRESS NOTE ADULT - SUBJECTIVE AND OBJECTIVE BOX
TEENA CORBIN is a 62yFemale , patient examined and chart reviewed.     INTERVAL HPI/ OVERNIGHT EVENTS:   Still wheezing. Resp status stable.  Afebrile.    PAST MEDICAL & SURGICAL HISTORY:  Depression  Hypothyroidism  Spinal stenosis  GERD (Gastroesophageal Reflux Disease)  Chronic Bronchitis  Asthma: trigger getting a cold. hospitalized multipl  times last 2007 denies intubation  Hyperlipidemia  Osteopenia  Cervical Disc Displacement: current diagnosis  Cervical vertebral fusion  S/P Colonoscopy: 2005  wnl  S/P Foot Surgery: ORIF left foot  S/P Cholecystectomy: open   1989  Kyphosis  Termination of Pregnancy: x3  remote    For details regarding the patient's social history, family history, and other miscellaneous elements, please refer the initial infectious diseases consultation and/or the admitting history and physical examination for this admission.    ROS:  CONSTITUTIONAL:  Negative fever or chills, feels well, good appetite  EYES:  Negative  blurry vision or double vision  CARDIOVASCULAR:  Negative for chest pain or palpitations  RESPIRATORY:  Negative for cough, wheezing, or SOB   GASTROINTESTINAL:  Negative for nausea, vomiting, diarrhea, constipation, or abdominal pain  GENITOURINARY:  Negative frequency, urgency or dysuria  NEUROLOGIC:  No headache, confusion, dizziness, lightheadedness  All other systems were reviewed and are negative     Current inpatient medications :    ANTIBIOTICS/RELEVANT:  oseltamivir 75 milliGRAM(s) Oral two times a day    acetaminophen   Tablet. 650 milliGRAM(s) Oral every 6 hours PRN  ALBUTerol    90 MICROgram(s) HFA Inhaler 2 Puff(s) Inhalation every 6 hours PRN  ALBUTerol/ipratropium for Nebulization 3 milliLiter(s) Nebulizer every 6 hours  ALBUTerol/ipratropium for Nebulization 3 milliLiter(s) Nebulizer every 4 hours PRN  atorvastatin 80 milliGRAM(s) Oral at bedtime  benzonatate 100 milliGRAM(s) Oral every 8 hours PRN  bisacodyl Suppository 10 milliGRAM(s) Rectal daily PRN  buDESOnide 160 MICROgram(s)/formoterol 4.5 MICROgram(s) Inhaler 2 Puff(s) Inhalation two times a day  docusate sodium 100 milliGRAM(s) Oral three times a day  enoxaparin Injectable 40 milliGRAM(s) SubCutaneous every 24 hours  escitalopram 10 milliGRAM(s) Oral daily  gabapentin 600 milliGRAM(s) Oral three times a day  guaiFENesin    Syrup 200 milliGRAM(s) Oral every 6 hours  levothyroxine 50 MICROGram(s) Oral daily  magnesium hydroxide Suspension 30 milliLiter(s) Oral daily PRN  montelukast 10 milliGRAM(s) Oral at bedtime  pantoprazole    Tablet 40 milliGRAM(s) Oral before breakfast  polyethylene glycol 3350 17 Gram(s) Oral two times a day  predniSONE   Tablet 50 milliGRAM(s) Oral daily  senna 2 Tablet(s) Oral at bedtime  tiotropium 18 MICROgram(s) Capsule 1 Capsule(s) Inhalation daily  traMADol 50 milliGRAM(s) Oral every 6 hours PRN  traMADol 100 milliGRAM(s) Oral every 6 hours PRN  zolpidem 5 milliGRAM(s) Oral at bedtime PRN      Objective:    01-17 @ 07:01  -  01-18 @ 07:00  --------------------------------------------------------  IN: 300 mL / OUT: 0 mL / NET: 300 mL      T(C): 36.9 (01-18-18 @ 14:44), Max: 36.9 (01-18-18 @ 05:13)  HR: 77 (01-18-18 @ 14:44) (65 - 77)  BP: 137/80 (01-18-18 @ 14:44) (133/74 - 161/79)  RR: 20 (01-18-18 @ 14:44) (18 - 20)  SpO2: 97% (01-18-18 @ 14:44) (95% - 98%)  Wt(kg): --      Physical Exam:  General: well developed well nourished, in no acute distress  Eyes: sclera anicteric, pupils equal and reactive to light  ENMT: buccal mucosa moist, pharynx not injected  Neck: supple, trachea midline  Lungs: +wheeze/rhonchi  Cardiovascular: regular rate and rhythm, S1 S2  Abdomen: soft, nontender, no organomegaly present, bowel sounds normal  Neurological: alert and oriented x3, Cranial Nerves II-XII grossly intact  Skin: no increased ecchymosis/petechiae/purpura  Lymph Nodes: no palpable cervical/supraclavicular lymph nodes enlargements  Extremities: no cyanosis/clubbing/edema        LABS:                          11.6   15.1  )-----------( 366      ( 18 Jan 2018 07:10 )             36.2       01-18    139  |  103  |  16  ----------------------------<  127<H>  4.1   |  28  |  0.78    Ca    9.0      18 Jan 2018 07:09  Mg     1.8     01-18    MICROBIOLOGY:    Culture - Blood (collected 16 Jan 2018 00:16)  Source: .Blood Blood-Peripheral  Preliminary Report (17 Jan 2018 01:02):    No growth to date.    Culture - Blood (collected 16 Jan 2018 00:16)  Source: .Blood Blood-Peripheral  Preliminary Report (17 Jan 2018 01:02):    No growth to date.    RADIOLOGY & ADDITIONAL STUDIES:    EXAM:  XR CHEST PORTABLE URGENT 1V                            PROCEDURE DATE:  01/15/2018        INTERPRETATION:  cough    A frontal chest film  demonstrates grossly clear lungs.  No acute   infiltrate or consolidation is  noted. The costophrenic angles are   grossly clear.    The heart size and vascular markings are within normal limits for   technique.      No mediastinal or hilar adenopathy is suggested. .     The osseous structures appear intact intact.     IMPRESSION:  Clear  lungs.  No acute airspace disease suggested.    Assessment :  62 years old female with past medical history of Asthma, Chronic Bronchitis, admitted with   Influenza A   Asthma exacerbation    Plan :   Tamiflu x 5 day  Steroid and nebs per pulm  Pulm toileting  Increase activity    Continue with present regiment.  Appropriate use of antibiotics and adverse effects reviewed.      I have discussed the above plan of care with patient in detail. She expressed understanding of the  treatment plan . Risks, benefits and alternatives discussed in detail.   I have asked if she has any questions or concerns and appropriately addressed them to the best of my ability .    > 35 minutes were spent in direct patient care reviewing notes, medications ,labs data/ imaging , discussion with multidisciplinary team.    Thank you for allowing me to participate in care of your patient .    She Coffman MD  Infectious Disease  [phone]

## 2018-01-18 NOTE — PROGRESS NOTE ADULT - SUBJECTIVE AND OBJECTIVE BOX
Patient is a 62y old  Female who presents with a chief complaint of flu (15 Alonso 2018 18:32)    HPI:  62 years old female with past medical history of Asthma, Cervical Disc Displacement, Chronic Bronchitis, Depression, GERD (Gastroesophageal Reflux Disease), Hyperlipidemia, Hypothyroidism, Osteopenia and spinal stenosis, who has been having increasing shortness of breath and cough for last 3-4 days. Patient also c/o fevers and body aches. Patient was seen by Dr. Mcwilliams in office today and received IM Steroids and DuoNeb. Patient continued to have shortness of breath so she came in to ER. She is c/o cough, which is not productive of sputum.     Patient is being admitted for further care, as she failed out patient treatment.     Patient denies any orthopnea or PND.   + Fever.   No dizziness or syncope. (15 Alonso 2018 16:42)    INTERVAL HPI/OVERNIGHT EVENTS:  Chart reviwed, notes reviewed.  Patient seen and examined.  Patient still having cough and shortness of breath.   Denies any chest pain or pressure.   Patient c/o back pain.       MEDICATIONS  (STANDING):  ALBUTerol/ipratropium for Nebulization 3 milliLiter(s) Nebulizer every 6 hours  atorvastatin 80 milliGRAM(s) Oral at bedtime  buDESOnide 160 MICROgram(s)/formoterol 4.5 MICROgram(s) Inhaler 2 Puff(s) Inhalation two times a day  docusate sodium 100 milliGRAM(s) Oral three times a day  enoxaparin Injectable 40 milliGRAM(s) SubCutaneous every 24 hours  escitalopram 10 milliGRAM(s) Oral daily  gabapentin 600 milliGRAM(s) Oral three times a day  guaiFENesin    Syrup 200 milliGRAM(s) Oral every 6 hours  levothyroxine 50 MICROGram(s) Oral daily  montelukast 10 milliGRAM(s) Oral at bedtime  oseltamivir 75 milliGRAM(s) Oral two times a day  pantoprazole    Tablet 40 milliGRAM(s) Oral before breakfast  polyethylene glycol 3350 17 Gram(s) Oral two times a day  predniSONE   Tablet 50 milliGRAM(s) Oral daily  senna 2 Tablet(s) Oral at bedtime  tiotropium 18 MICROgram(s) Capsule 1 Capsule(s) Inhalation daily    MEDICATIONS  (PRN):  acetaminophen   Tablet. 650 milliGRAM(s) Oral every 6 hours PRN Mild Pain (1 - 3)  ALBUTerol    90 MICROgram(s) HFA Inhaler 2 Puff(s) Inhalation every 6 hours PRN Shortness of Breath and/or Wheezing  ALBUTerol/ipratropium for Nebulization 3 milliLiter(s) Nebulizer every 4 hours PRN Shortness of Breath and/or Wheezing  benzonatate 100 milliGRAM(s) Oral every 8 hours PRN Cough  bisacodyl Suppository 10 milliGRAM(s) Rectal daily PRN Constipation  magnesium hydroxide Suspension 30 milliLiter(s) Oral daily PRN Constipation  traMADol 50 milliGRAM(s) Oral every 6 hours PRN Moderate Pain (4 - 6)  traMADol 100 milliGRAM(s) Oral every 6 hours PRN Severe Pain (7 - 10)  zolpidem 5 milliGRAM(s) Oral at bedtime PRN Insomnia      Allergies: No Known Allergies    Intolerances    REVIEW OF SYSTEMS:  CONSTITUTIONAL: + fever, + fatigue  EYES: No eye pain, visual disturbances, or discharge  ENMT:  No difficulty hearing, tinnitus, vertigo; No sinus or throat pain  NECK: No pain or stiffness  BREASTS: No pain, masses, or nipple discharge  RESPIRATORY: + cough, + wheezing, + shortness of breath  CARDIOVASCULAR: No chest pain, palpitations, dizziness, or leg swelling  GASTROINTESTINAL: No abdominal or epigastric pain. No nausea, vomiting, or hematemesis; No diarrhea or constipation. No melena or hematochezia.  GENITOURINARY: No dysuria, frequency, hematuria, or incontinence  NEUROLOGICAL: No headaches, memory loss, loss of strength, numbness, or tremors  SKIN: No itching, burning, rashes, or lesions   LYMPH NODES: No enlarged glands  ENDOCRINE: No heat or cold intolerance; No hair loss; No polydipsia or polyuria  MUSCULOSKELETAL: No back pain  PSYCHIATRIC: No depression, anxiety, mood swings, or difficulty sleeping  HEME/LYMPH: No easy bruising, or bleeding gums  ALLERGY AND IMMUNOLOGIC: No hives or eczema    Vital Signs Last 24 Hrs  T(C): 36.7 (18 Jan 2018 17:13), Max: 36.9 (18 Jan 2018 05:13)  T(F): 98.1 (18 Jan 2018 17:13), Max: 98.5 (18 Jan 2018 05:13)  HR: 77 (18 Jan 2018 17:13) (65 - 77)  BP: 163/88 (18 Jan 2018 17:13) (133/74 - 163/88)  BP(mean): --  RR: 19 (18 Jan 2018 17:13) (18 - 20)  SpO2: 97% (18 Jan 2018 17:13) (95% - 98%)    PHYSICAL EXAM:  GENERAL: NAD, well-groomed, well-developed  HEAD:  Atraumatic, Normocephalic  EYES: EOMI, PERRLA, conjunctiva and sclera clear  ENMT: No tonsillar erythema, exudates, or enlargement; Moist mucous membranes, Good dentition, No lesions  NECK: Supple, No JVD, Normal thyroid  NERVOUS SYSTEM:  Alert & Oriented X3, Good concentration; Bilateral LE mobile, sensation to light touch intact  CHEST/LUNG: Bilateral expiratory wheeze +. No rhonchi.    HEART: Regular rate and rhythm; No murmurs, rubs, or gallops  ABDOMEN: Soft, Nontender, Nondistended; Bowel sounds present  EXTREMITIES:  2+ Peripheral Pulses, No clubbing or cyanosis  LYMPH: No lymphadenopathy noted  SKIN: No rashes or lesions    LABS:                                11.6   15.1  )-----------( 366      ( 18 Jan 2018 07:10 )             36.2     18 Jan 2018 07:09    139    |  103    |  16     ----------------------------<  127    4.1     |  28     |  0.78     Ca    9.0        18 Jan 2018 07:09  Mg     1.8       18 Jan 2018 07:09    01-15 [id number] 6.0    01-15 Chol 215<H>  HDL 82 Trig 139    Thyroid Stimulating Hormone, Serum: 0.86 uIU/mL (01-15 @ 23:15)    Creatine Kinase, Serum: 126 U/L (01-16 @ 06:43)  Creatine Kinase, Serum: 205 U/L (01-15 @ 17:05)    Troponin I, Serum: .019 ng/mL (01-16 @ 06:43)  Troponin I, Serum: .023 ng/mL (01-15 @ 17:05)    Culture Results:   No growth to date. (01-16 @ 00:16)  Culture Results:   No growth to date. (01-16 @ 00:16)      RADIOLOGY & ADDITIONAL TESTS:  < from: US Transthoracic Echocardiogram w/Doppler Complete (01.16.18 @ 09:04) >  EXAM:  US TTE W DOPPLER COMPLETE                        PROCEDURE DATE:  01/16/2018    INTERPRETATION:  Ordering Physician: MARIO FITCH [id number]    Indication: Shortness of breath    CONCLUSIONS:    1. Left ventricle is normal in size and wall thickness. Left ventricular   ejection fraction is estimated at approximately 70%.  2. Mild left ventricular diastolic dysfunction.  3. Mild left atrial enlargement.  4. Normal right ventricular size and systolic function.  5. Thickened mitral valve without stenosis. There is  mitral   regurgitation appearing mild in degree by limited Doppler study  6. Thickened aortic valve without stenosis. Aortic insufficiency   appearing mild to moderate in degree by limited Doppler evaluation  7. Mild tricuspid regurgitation.  8. Trace pulmonic insufficiency.    < end of copied text >    Imaging Personally Reviewed:  [X] YES      Consultant(s) Notes Reviewed:  Yes    Care Discussed with Consultants/Other Providers: Yes

## 2018-01-19 RX ORDER — AZITHROMYCIN 500 MG/1
250 TABLET, FILM COATED ORAL DAILY
Qty: 0 | Refills: 0 | Status: DISCONTINUED | OUTPATIENT
Start: 2018-01-19 | End: 2018-01-21

## 2018-01-19 RX ADMIN — BUDESONIDE AND FORMOTEROL FUMARATE DIHYDRATE 2 PUFF(S): 160; 4.5 AEROSOL RESPIRATORY (INHALATION) at 06:46

## 2018-01-19 RX ADMIN — PANTOPRAZOLE SODIUM 40 MILLIGRAM(S): 20 TABLET, DELAYED RELEASE ORAL at 06:44

## 2018-01-19 RX ADMIN — BUDESONIDE AND FORMOTEROL FUMARATE DIHYDRATE 2 PUFF(S): 160; 4.5 AEROSOL RESPIRATORY (INHALATION) at 19:05

## 2018-01-19 RX ADMIN — Medication 100 MILLIGRAM(S): at 21:55

## 2018-01-19 RX ADMIN — ATORVASTATIN CALCIUM 80 MILLIGRAM(S): 80 TABLET, FILM COATED ORAL at 21:55

## 2018-01-19 RX ADMIN — ENOXAPARIN SODIUM 40 MILLIGRAM(S): 100 INJECTION SUBCUTANEOUS at 17:08

## 2018-01-19 RX ADMIN — Medication 3 MILLILITER(S): at 00:52

## 2018-01-19 RX ADMIN — GABAPENTIN 600 MILLIGRAM(S): 400 CAPSULE ORAL at 16:13

## 2018-01-19 RX ADMIN — Medication 75 MILLIGRAM(S): at 17:07

## 2018-01-19 RX ADMIN — MONTELUKAST 10 MILLIGRAM(S): 4 TABLET, CHEWABLE ORAL at 21:55

## 2018-01-19 RX ADMIN — ESCITALOPRAM OXALATE 10 MILLIGRAM(S): 10 TABLET, FILM COATED ORAL at 11:30

## 2018-01-19 RX ADMIN — GABAPENTIN 600 MILLIGRAM(S): 400 CAPSULE ORAL at 21:55

## 2018-01-19 RX ADMIN — Medication 3 MILLILITER(S): at 13:42

## 2018-01-19 RX ADMIN — Medication 650 MILLIGRAM(S): at 21:51

## 2018-01-19 RX ADMIN — Medication 3 MILLILITER(S): at 06:29

## 2018-01-19 RX ADMIN — SENNA PLUS 2 TABLET(S): 8.6 TABLET ORAL at 21:55

## 2018-01-19 RX ADMIN — Medication 100 MILLIGRAM(S): at 06:45

## 2018-01-19 RX ADMIN — GABAPENTIN 600 MILLIGRAM(S): 400 CAPSULE ORAL at 06:44

## 2018-01-19 RX ADMIN — POLYETHYLENE GLYCOL 3350 17 GRAM(S): 17 POWDER, FOR SOLUTION ORAL at 17:08

## 2018-01-19 RX ADMIN — Medication 200 MILLIGRAM(S): at 11:30

## 2018-01-19 RX ADMIN — TIOTROPIUM BROMIDE 1 CAPSULE(S): 18 CAPSULE ORAL; RESPIRATORY (INHALATION) at 06:46

## 2018-01-19 RX ADMIN — Medication 50 MICROGRAM(S): at 06:44

## 2018-01-19 RX ADMIN — Medication 100 MILLIGRAM(S): at 16:12

## 2018-01-19 RX ADMIN — AZITHROMYCIN 250 MILLIGRAM(S): 500 TABLET, FILM COATED ORAL at 11:30

## 2018-01-19 RX ADMIN — POLYETHYLENE GLYCOL 3350 17 GRAM(S): 17 POWDER, FOR SOLUTION ORAL at 06:45

## 2018-01-19 RX ADMIN — Medication 200 MILLIGRAM(S): at 23:39

## 2018-01-19 RX ADMIN — Medication 3 MILLILITER(S): at 06:32

## 2018-01-19 RX ADMIN — ZOLPIDEM TARTRATE 5 MILLIGRAM(S): 10 TABLET ORAL at 23:39

## 2018-01-19 RX ADMIN — Medication 3 MILLILITER(S): at 20:34

## 2018-01-19 RX ADMIN — Medication 50 MILLIGRAM(S): at 06:44

## 2018-01-19 RX ADMIN — Medication 650 MILLIGRAM(S): at 20:06

## 2018-01-19 RX ADMIN — Medication 200 MILLIGRAM(S): at 17:07

## 2018-01-19 RX ADMIN — Medication 75 MILLIGRAM(S): at 06:44

## 2018-01-19 RX ADMIN — Medication 200 MILLIGRAM(S): at 06:45

## 2018-01-19 NOTE — PROGRESS NOTE ADULT - SUBJECTIVE AND OBJECTIVE BOX
PULMONARY/CRITICAL CARE      INTERVAL HPI/OVERNIGHT EVENTS:  Still wheezing, coughing alot. Yellow sputum now.    62y FemaleHPI:  62 years old female with past medical history of Asthma, Cervical Disc Displacement, Chronic Bronchitis, Depression, GERD (Gastroesophageal Reflux Disease), Hyperlipidemia, Hypothyroidism, Osteopenia and spinal stenosis, who has been having increasing shortness of breath and cough for last 3-4 days. Patient also c/o fevers and body aches. Patient was seen by Dr. Mcwilliams in office today and received IM Steroids and DuoNeb. Patient continued to have shortness of breath so she came in to ER. She is c/o cough, which is not productive of sputum.     Patient is being admitted for further care, as she failed out patient treatment.     Patient denies any orthopnea or PND.   + Fever.   No dizziness or syncope. (15 Alonso 2018 16:42)        PAST MEDICAL & SURGICAL HISTORY:  Depression  Hypothyroidism  Spinal stenosis  GERD (Gastroesophageal Reflux Disease)  Chronic Bronchitis  Asthma: trigger getting a cold. hospitalized multipl  times last 2007 denies intubation  Hyperlipidemia  Osteopenia  Cervical Disc Displacement: current diagnosis  Cervical vertebral fusion  S/P Colonoscopy: 2005  wnl  S/P Foot Surgery: ORIF left foot  S/P Cholecystectomy: open   1989  Kyphosis  Termination of Pregnancy: x3  remote        ICU Vital Signs Last 24 Hrs  T(C): 37.1 (19 Jan 2018 05:18), Max: 37.1 (19 Jan 2018 05:18)  T(F): 98.8 (19 Jan 2018 05:18), Max: 98.8 (19 Jan 2018 05:18)  HR: 64 (19 Jan 2018 06:30) (64 - 77)  BP: 122/71 (19 Jan 2018 05:18) (122/71 - 163/88)  BP(mean): --  ABP: --  ABP(mean): --  RR: 18 (19 Jan 2018 05:18) (18 - 20)  SpO2: 95% (19 Jan 2018 06:30) (93% - 98%)    Qtts:     I&O's Summary    REVIEW OF SYSTEMS:    CONSTITUTIONAL: No fever, weight loss, or fatigue  EYES: No eye pain, visual disturbances, or discharge  ENMT:  No difficulty hearing, tinnitus, vertigo; No sinus or throat pain  NECK: No pain or stiffness  BREASTS: No pain, masses, or nipple discharge  RESPIRATORY: has cough, wheezing, No chills or hemoptysis; mild shortness of breath  CARDIOVASCULAR: No chest pain, palpitations, dizziness, or leg swelling  GASTROINTESTINAL: No abdominal or epigastric pain. No nausea, vomiting, or hematemesis; No diarrhea or constipation. No melena or hematochezia.  GENITOURINARY: No dysuria, frequency, hematuria, or incontinence  NEUROLOGICAL: No headaches, memory loss, loss of strength, numbness, or tremors  SKIN: No itching, burning, rashes, or lesions   LYMPH NODES: No enlarged glands  ENDOCRINE: No heat or cold intolerance; No hair loss  MUSCULOSKELETAL: No joint pain or swelling; No muscle, back, or extremity pain, no calf tenderness  PSYCHIATRIC: No depression, anxiety, mood swings, or difficulty sleeping  HEME/LYMPH: No easy bruising, or bleeding gums  ALLERGY AND IMMUNOLOGIC: No hives or eczema      PHYSICAL EXAM:    GENERAL: NAD, well-groomed, well-developed, NAD  HEAD:  Atraumatic, Normocephalic  EYES: EOMI, PERRLA, conjunctiva and sclera clear  ENMT: No tonsillar erythema, exudates, or enlargement; Moist mucous membranes, Good dentition, No lesions  NECK: Supple, No JVD, Normal thyroid  NERVOUS SYSTEM:  Alert & Oriented X3, Good concentration; Motor Strength 5/5 B/L upper and lower extremities  CHEST/LUNG: few bilat rhonchi, wheezing,  Good excursion, no rubs  HEART: Regular rate and rhythm; No murmurs, rubs, or gallops  ABDOMEN: Soft, Nontender, Nondistended; Bowel sounds present  EXTREMITIES:  2+ Peripheral Pulses, No clubbing, cyanosis, or edema  LYMPH: No lymphadenopathy noted  SKIN: No rashes or lesions            LABS:                        11.6   15.1  )-----------( 366      ( 18 Jan 2018 07:10 )             36.2     01-18    139  |  103  |  16  ----------------------------<  127<H>  4.1   |  28  |  0.78    Ca    9.0      18 Jan 2018 07:09  Mg     1.8     01-18            vanco through     RADIOLOGY & ADDITIONAL STUDIES:      CRITICAL CARE TIME SPENT:

## 2018-01-19 NOTE — PROGRESS NOTE ADULT - SUBJECTIVE AND OBJECTIVE BOX
Patient is a 62y old  Female who presents with a chief complaint of flu (15 Alonso 2018 18:32)    HPI:  62 years old female with past medical history of Asthma, Cervical Disc Displacement, Chronic Bronchitis, Depression, GERD (Gastroesophageal Reflux Disease), Hyperlipidemia, Hypothyroidism, Osteopenia and spinal stenosis, who has been having increasing shortness of breath and cough for last 3-4 days. Patient also c/o fevers and body aches. Patient was seen by Dr. Mcwilliams in office today and received IM Steroids and DuoNeb. Patient continued to have shortness of breath so she came in to ER. She is c/o cough, which is not productive of sputum.     Patient is being admitted for further care, as she failed out patient treatment.     Patient denies any orthopnea or PND.   + Fever.   No dizziness or syncope. (15 Alonso 2018 16:42)    INTERVAL HPI/OVERNIGHT EVENTS:  Chart reviwed, notes reviewed.  Patient seen and examined.  Patient still having cough and shortness of breath.   Now has yellow sputum.   Denies any chest pain or pressure.     MEDICATIONS  (STANDING):  ALBUTerol/ipratropium for Nebulization 3 milliLiter(s) Nebulizer every 6 hours  atorvastatin 80 milliGRAM(s) Oral at bedtime  azithromycin   Tablet 250 milliGRAM(s) Oral daily  buDESOnide 160 MICROgram(s)/formoterol 4.5 MICROgram(s) Inhaler 2 Puff(s) Inhalation two times a day  docusate sodium 100 milliGRAM(s) Oral three times a day  enoxaparin Injectable 40 milliGRAM(s) SubCutaneous every 24 hours  escitalopram 10 milliGRAM(s) Oral daily  gabapentin 600 milliGRAM(s) Oral three times a day  guaiFENesin    Syrup 200 milliGRAM(s) Oral every 6 hours  levothyroxine 50 MICROGram(s) Oral daily  montelukast 10 milliGRAM(s) Oral at bedtime  oseltamivir 75 milliGRAM(s) Oral two times a day  pantoprazole    Tablet 40 milliGRAM(s) Oral before breakfast  polyethylene glycol 3350 17 Gram(s) Oral two times a day  predniSONE   Tablet 50 milliGRAM(s) Oral daily  senna 2 Tablet(s) Oral at bedtime  tiotropium 18 MICROgram(s) Capsule 1 Capsule(s) Inhalation daily    MEDICATIONS  (PRN):  acetaminophen   Tablet. 650 milliGRAM(s) Oral every 6 hours PRN Mild Pain (1 - 3)  ALBUTerol    90 MICROgram(s) HFA Inhaler 2 Puff(s) Inhalation every 6 hours PRN Shortness of Breath and/or Wheezing  ALBUTerol/ipratropium for Nebulization 3 milliLiter(s) Nebulizer every 4 hours PRN Shortness of Breath and/or Wheezing  benzonatate 100 milliGRAM(s) Oral every 8 hours PRN Cough  bisacodyl Suppository 10 milliGRAM(s) Rectal daily PRN Constipation  magnesium hydroxide Suspension 30 milliLiter(s) Oral daily PRN Constipation  traMADol 50 milliGRAM(s) Oral every 6 hours PRN Moderate Pain (4 - 6)  traMADol 100 milliGRAM(s) Oral every 6 hours PRN Severe Pain (7 - 10)  zolpidem 5 milliGRAM(s) Oral at bedtime PRN Insomnia    Allergies: No Known Allergies    Intolerances    REVIEW OF SYSTEMS:  CONSTITUTIONAL: + fever, + fatigue  EYES: No eye pain, visual disturbances, or discharge  ENMT:  No difficulty hearing, tinnitus, vertigo; No sinus or throat pain  NECK: No pain or stiffness  BREASTS: No pain, masses, or nipple discharge  RESPIRATORY: + cough, + wheezing, + shortness of breath  CARDIOVASCULAR: No chest pain, palpitations, dizziness, or leg swelling  GASTROINTESTINAL: No abdominal or epigastric pain. No nausea, vomiting, or hematemesis; No diarrhea or constipation. No melena or hematochezia.  GENITOURINARY: No dysuria, frequency, hematuria, or incontinence  NEUROLOGICAL: No headaches, memory loss, loss of strength, numbness, or tremors  SKIN: No itching, burning, rashes, or lesions   LYMPH NODES: No enlarged glands  ENDOCRINE: No heat or cold intolerance; No hair loss; No polydipsia or polyuria  MUSCULOSKELETAL: No back pain  PSYCHIATRIC: No depression, anxiety, mood swings, or difficulty sleeping  HEME/LYMPH: No easy bruising, or bleeding gums  ALLERGY AND IMMUNOLOGIC: No hives or eczema    Vital Signs Last 24 Hrs  T(C): 36.7 (19 Jan 2018 10:14), Max: 37.1 (19 Jan 2018 05:18)  T(F): 98 (19 Jan 2018 10:14), Max: 98.8 (19 Jan 2018 05:18)  HR: 69 (19 Jan 2018 10:14) (64 - 77)  BP: 124/74 (19 Jan 2018 10:14) (122/71 - 163/88)  BP(mean): --  RR: 18 (19 Jan 2018 10:14) (18 - 20)  SpO2: 94% (19 Jan 2018 10:14) (93% - 97%)    PHYSICAL EXAM:  GENERAL: NAD, well-groomed, well-developed  HEAD:  Atraumatic, Normocephalic  EYES: EOMI, PERRLA, conjunctiva and sclera clear  ENMT: No tonsillar erythema, exudates, or enlargement; Moist mucous membranes, Good dentition, No lesions  NECK: Supple, No JVD, Normal thyroid  NERVOUS SYSTEM:  Alert & Oriented X3, Good concentration; Bilateral LE mobile, sensation to light touch intact  CHEST/LUNG: Bilateral expiratory wheeze +. No rhonchi.    HEART: Regular rate and rhythm; No murmurs, rubs, or gallops  ABDOMEN: Soft, Nontender, Nondistended; Bowel sounds present  EXTREMITIES:  2+ Peripheral Pulses, No clubbing or cyanosis  LYMPH: No lymphadenopathy noted  SKIN: No rashes or lesions    LABS:             11.6   15.1  )-----------( 366      ( 18 Jan 2018 07:10 )             36.2     18 Jan 2018 07:09    139    |  103    |  16     ----------------------------<  127    4.1     |  28     |  0.78     Ca    9.0        18 Jan 2018 07:09  Mg     1.8       18 Jan 2018 07:09    01-15 [id number] 6.0    01-15 Chol 215<H>  HDL 82 Trig 139    Thyroid Stimulating Hormone, Serum: 0.86 uIU/mL (01-15 @ 23:15)    Creatine Kinase, Serum: 126 U/L (01-16 @ 06:43)  Creatine Kinase, Serum: 205 U/L (01-15 @ 17:05)    Troponin I, Serum: .019 ng/mL (01-16 @ 06:43)  Troponin I, Serum: .023 ng/mL (01-15 @ 17:05)    Culture Results:   No growth to date. (01-16 @ 00:16)  Culture Results:   No growth to date. (01-16 @ 00:16)      RADIOLOGY & ADDITIONAL TESTS:  < from: US Transthoracic Echocardiogram w/Doppler Complete (01.16.18 @ 09:04) >  EXAM:  US TTE W DOPPLER COMPLETE                        PROCEDURE DATE:  01/16/2018    INTERPRETATION:  Ordering Physician: MARIO FITCH [id number]    Indication: Shortness of breath    CONCLUSIONS:    1. Left ventricle is normal in size and wall thickness. Left ventricular   ejection fraction is estimated at approximately 70%.  2. Mild left ventricular diastolic dysfunction.  3. Mild left atrial enlargement.  4. Normal right ventricular size and systolic function.  5. Thickened mitral valve without stenosis. There is  mitral   regurgitation appearing mild in degree by limited Doppler study  6. Thickened aortic valve without stenosis. Aortic insufficiency   appearing mild to moderate in degree by limited Doppler evaluation  7. Mild tricuspid regurgitation.  8. Trace pulmonic insufficiency.    < end of copied text >    Imaging Personally Reviewed:  [X] YES      Consultant(s) Notes Reviewed:  Yes    Care Discussed with Consultants/Other Providers: Yes

## 2018-01-19 NOTE — PROGRESS NOTE ADULT - SUBJECTIVE AND OBJECTIVE BOX
TEENA CORBIN is a 62yFemale , patient examined and chart reviewed.     INTERVAL HPI/ OVERNIGHT EVENTS:   Feeling better. Afebrile.    PAST MEDICAL & SURGICAL HISTORY:  Depression  Hypothyroidism  Spinal stenosis  GERD (Gastroesophageal Reflux Disease)  Chronic Bronchitis  Asthma: trigger getting a cold. hospitalized multipl  times last 2007 denies intubation  Hyperlipidemia  Osteopenia  Cervical Disc Displacement: current diagnosis  Cervical vertebral fusion  S/P Colonoscopy: 2005  wnl  S/P Foot Surgery: ORIF left foot  S/P Cholecystectomy: open   1989  Kyphosis  Termination of Pregnancy: x3  remote    For details regarding the patient's social history, family history, and other miscellaneous elements, please refer the initial infectious diseases consultation and/or the admitting history and physical examination for this admission.    ROS:  CONSTITUTIONAL:  Negative fever or chills, feels well, good appetite  EYES:  Negative  blurry vision or double vision  CARDIOVASCULAR:  Negative for chest pain or palpitations  RESPIRATORY:  + cough, wheezing, No SOB   GASTROINTESTINAL:  Negative for nausea, vomiting, diarrhea, constipation, or abdominal pain  GENITOURINARY:  Negative frequency, urgency or dysuria  NEUROLOGIC:  No headache, confusion, dizziness, lightheadedness  All other systems were reviewed and are negative     Current inpatient medications :    ANTIBIOTICS/RELEVANT:  azithromycin   Tablet 250 milliGRAM(s) Oral daily  oseltamivir 75 milliGRAM(s) Oral two times a day    acetaminophen   Tablet. 650 milliGRAM(s) Oral every 6 hours PRN  ALBUTerol    90 MICROgram(s) HFA Inhaler 2 Puff(s) Inhalation every 6 hours PRN  ALBUTerol/ipratropium for Nebulization 3 milliLiter(s) Nebulizer every 6 hours  ALBUTerol/ipratropium for Nebulization 3 milliLiter(s) Nebulizer every 4 hours PRN  atorvastatin 80 milliGRAM(s) Oral at bedtime  benzonatate 100 milliGRAM(s) Oral every 8 hours PRN  bisacodyl Suppository 10 milliGRAM(s) Rectal daily PRN  buDESOnide 160 MICROgram(s)/formoterol 4.5 MICROgram(s) Inhaler 2 Puff(s) Inhalation two times a day  docusate sodium 100 milliGRAM(s) Oral three times a day  enoxaparin Injectable 40 milliGRAM(s) SubCutaneous every 24 hours  escitalopram 10 milliGRAM(s) Oral daily  gabapentin 600 milliGRAM(s) Oral three times a day  guaiFENesin    Syrup 200 milliGRAM(s) Oral every 6 hours  levothyroxine 50 MICROGram(s) Oral daily  magnesium hydroxide Suspension 30 milliLiter(s) Oral daily PRN  montelukast 10 milliGRAM(s) Oral at bedtime  pantoprazole    Tablet 40 milliGRAM(s) Oral before breakfast  polyethylene glycol 3350 17 Gram(s) Oral two times a day  predniSONE   Tablet 50 milliGRAM(s) Oral daily  senna 2 Tablet(s) Oral at bedtime  tiotropium 18 MICROgram(s) Capsule 1 Capsule(s) Inhalation daily  traMADol 50 milliGRAM(s) Oral every 6 hours PRN  traMADol 100 milliGRAM(s) Oral every 6 hours PRN  zolpidem 5 milliGRAM(s) Oral at bedtime PRN      Objective:    01-19 @ 07:01  -  01-19 @ 15:51  --------------------------------------------------------  IN: 300 mL / OUT: 0 mL / NET: 300 mL      T(C): 36.4 (01-19-18 @ 15:01), Max: 37.1 (01-19-18 @ 05:18)  HR: 69 (01-19-18 @ 15:01) (64 - 77)  BP: 129/77 (01-19-18 @ 15:01) (122/71 - 163/88)  RR: 20 (01-19-18 @ 15:01) (18 - 20)  SpO2: 95% (01-19-18 @ 15:01) (93% - 97%)  Wt(kg): --      Physical Exam:  General: no acute distress  Eyes: sclera anicteric, pupils equal and reactive to light  ENMT: buccal mucosa moist, pharynx not injected  Neck: supple, trachea midline  Lungs: Decreased + wheeze No rhonchi  Cardiovascular: regular rate and rhythm, S1 S2  Abdomen: soft, nontender, no organomegaly present, bowel sounds normal  Neurological: alert and oriented x3, Cranial Nerves II-XII grossly intact  Skin: no increased ecchymosis/petechiae/purpura  Lymph Nodes: no palpable cervical/supraclavicular lymph nodes enlargements  Extremities: no cyanosis/clubbing/edema      LABS:                          11.6   15.1  )-----------( 366      ( 18 Jan 2018 07:10 )             36.2       01-18    139  |  103  |  16  ----------------------------<  127<H>  4.1   |  28  |  0.78    Ca    9.0      18 Jan 2018 07:09  Mg     1.8     01-18    Assessment :  62 years old female with past medical history of Asthma, Chronic Bronchitis, admitted with   Influenza A   Asthma exacerbation  Clinically better    Plan :   Tamiflu x 5 day  Zithromax per Pulm  Steroid and nebs per pulm  Pulm toileting  Increase activity  Dc planning      Continue with present regiment.  Appropriate use of antibiotics and adverse effects reviewed.      I have discussed the above plan of care with patient in detail. She expressed understanding of the  treatment plan . Risks, benefits and alternatives discussed in detail.   I have asked if she has any questions or concerns and appropriately addressed them to the best of my ability .    > 35 minutes were spent in direct patient care reviewing notes, medications ,labs data/ imaging , discussion with multidisciplinary team.    Thank you for allowing me to participate in care of your patient .    She Coffman MD  Infectious Disease  [phone]

## 2018-01-20 LAB
ANION GAP SERPL CALC-SCNC: 8 MMOL/L — SIGNIFICANT CHANGE UP (ref 5–17)
BUN SERPL-MCNC: 15 MG/DL — SIGNIFICANT CHANGE UP (ref 7–23)
CALCIUM SERPL-MCNC: 8.9 MG/DL — SIGNIFICANT CHANGE UP (ref 8.4–10.5)
CHLORIDE SERPL-SCNC: 102 MMOL/L — SIGNIFICANT CHANGE UP (ref 96–108)
CO2 SERPL-SCNC: 28 MMOL/L — SIGNIFICANT CHANGE UP (ref 22–31)
CREAT SERPL-MCNC: 0.82 MG/DL — SIGNIFICANT CHANGE UP (ref 0.5–1.3)
GLUCOSE SERPL-MCNC: 106 MG/DL — HIGH (ref 70–99)
HCT VFR BLD CALC: 38.9 % — SIGNIFICANT CHANGE UP (ref 34.5–45)
HGB BLD-MCNC: 13.1 G/DL — SIGNIFICANT CHANGE UP (ref 11.5–15.5)
MCHC RBC-ENTMCNC: 31.1 PG — SIGNIFICANT CHANGE UP (ref 27–34)
MCHC RBC-ENTMCNC: 33.7 GM/DL — SIGNIFICANT CHANGE UP (ref 32–36)
MCV RBC AUTO: 92.2 FL — SIGNIFICANT CHANGE UP (ref 80–100)
PLATELET # BLD AUTO: 408 K/UL — HIGH (ref 150–400)
POTASSIUM SERPL-MCNC: 4.1 MMOL/L — SIGNIFICANT CHANGE UP (ref 3.5–5.3)
POTASSIUM SERPL-SCNC: 4.1 MMOL/L — SIGNIFICANT CHANGE UP (ref 3.5–5.3)
RBC # BLD: 4.22 M/UL — SIGNIFICANT CHANGE UP (ref 3.8–5.2)
RBC # FLD: 12.8 % — SIGNIFICANT CHANGE UP (ref 10.3–14.5)
SODIUM SERPL-SCNC: 138 MMOL/L — SIGNIFICANT CHANGE UP (ref 135–145)
WBC # BLD: 13.6 K/UL — HIGH (ref 3.8–10.5)
WBC # FLD AUTO: 13.6 K/UL — HIGH (ref 3.8–10.5)

## 2018-01-20 RX ORDER — DIPHENHYDRAMINE HYDROCHLORIDE AND LIDOCAINE HYDROCHLORIDE AND ALUMINUM HYDROXIDE AND MAGNESIUM HYDRO
5 KIT
Qty: 0 | Refills: 0 | Status: DISCONTINUED | OUTPATIENT
Start: 2018-01-20 | End: 2018-01-21

## 2018-01-20 RX ADMIN — Medication 100 MILLIGRAM(S): at 13:10

## 2018-01-20 RX ADMIN — Medication 200 MILLIGRAM(S): at 23:18

## 2018-01-20 RX ADMIN — DIPHENHYDRAMINE HYDROCHLORIDE AND LIDOCAINE HYDROCHLORIDE AND ALUMINUM HYDROXIDE AND MAGNESIUM HYDRO 5 MILLILITER(S): KIT at 17:07

## 2018-01-20 RX ADMIN — SENNA PLUS 2 TABLET(S): 8.6 TABLET ORAL at 22:02

## 2018-01-20 RX ADMIN — Medication 200 MILLIGRAM(S): at 11:27

## 2018-01-20 RX ADMIN — BUDESONIDE AND FORMOTEROL FUMARATE DIHYDRATE 2 PUFF(S): 160; 4.5 AEROSOL RESPIRATORY (INHALATION) at 06:48

## 2018-01-20 RX ADMIN — Medication 200 MILLIGRAM(S): at 06:01

## 2018-01-20 RX ADMIN — GABAPENTIN 600 MILLIGRAM(S): 400 CAPSULE ORAL at 13:10

## 2018-01-20 RX ADMIN — Medication 100 MILLIGRAM(S): at 22:03

## 2018-01-20 RX ADMIN — DIPHENHYDRAMINE HYDROCHLORIDE AND LIDOCAINE HYDROCHLORIDE AND ALUMINUM HYDROXIDE AND MAGNESIUM HYDRO 5 MILLILITER(S): KIT at 23:20

## 2018-01-20 RX ADMIN — GABAPENTIN 600 MILLIGRAM(S): 400 CAPSULE ORAL at 22:02

## 2018-01-20 RX ADMIN — ZOLPIDEM TARTRATE 5 MILLIGRAM(S): 10 TABLET ORAL at 23:14

## 2018-01-20 RX ADMIN — Medication 200 MILLIGRAM(S): at 17:06

## 2018-01-20 RX ADMIN — Medication 100 MILLIGRAM(S): at 22:07

## 2018-01-20 RX ADMIN — ESCITALOPRAM OXALATE 10 MILLIGRAM(S): 10 TABLET, FILM COATED ORAL at 11:27

## 2018-01-20 RX ADMIN — Medication 50 MICROGRAM(S): at 06:05

## 2018-01-20 RX ADMIN — Medication 650 MILLIGRAM(S): at 22:20

## 2018-01-20 RX ADMIN — AZITHROMYCIN 250 MILLIGRAM(S): 500 TABLET, FILM COATED ORAL at 11:27

## 2018-01-20 RX ADMIN — PANTOPRAZOLE SODIUM 40 MILLIGRAM(S): 20 TABLET, DELAYED RELEASE ORAL at 06:02

## 2018-01-20 RX ADMIN — TIOTROPIUM BROMIDE 1 CAPSULE(S): 18 CAPSULE ORAL; RESPIRATORY (INHALATION) at 06:48

## 2018-01-20 RX ADMIN — BUDESONIDE AND FORMOTEROL FUMARATE DIHYDRATE 2 PUFF(S): 160; 4.5 AEROSOL RESPIRATORY (INHALATION) at 19:02

## 2018-01-20 RX ADMIN — Medication 50 MILLIGRAM(S): at 06:02

## 2018-01-20 RX ADMIN — POLYETHYLENE GLYCOL 3350 17 GRAM(S): 17 POWDER, FOR SOLUTION ORAL at 06:01

## 2018-01-20 RX ADMIN — ATORVASTATIN CALCIUM 80 MILLIGRAM(S): 80 TABLET, FILM COATED ORAL at 22:03

## 2018-01-20 RX ADMIN — ENOXAPARIN SODIUM 40 MILLIGRAM(S): 100 INJECTION SUBCUTANEOUS at 17:06

## 2018-01-20 RX ADMIN — Medication 650 MILLIGRAM(S): at 12:30

## 2018-01-20 RX ADMIN — Medication 650 MILLIGRAM(S): at 22:02

## 2018-01-20 RX ADMIN — Medication 650 MILLIGRAM(S): at 11:31

## 2018-01-20 RX ADMIN — GABAPENTIN 600 MILLIGRAM(S): 400 CAPSULE ORAL at 06:02

## 2018-01-20 RX ADMIN — Medication 75 MILLIGRAM(S): at 06:05

## 2018-01-20 RX ADMIN — MONTELUKAST 10 MILLIGRAM(S): 4 TABLET, CHEWABLE ORAL at 22:03

## 2018-01-20 RX ADMIN — Medication 100 MILLIGRAM(S): at 06:02

## 2018-01-20 RX ADMIN — POLYETHYLENE GLYCOL 3350 17 GRAM(S): 17 POWDER, FOR SOLUTION ORAL at 17:06

## 2018-01-20 NOTE — PROGRESS NOTE ADULT - SUBJECTIVE AND OBJECTIVE BOX
PULMONARY/CRITICAL CARE      INTERVAL HPI/OVERNIGHT EVENTS:  Still wheezing alot. Some cough. No fever.    62y FemaleHPI:  62 years old female with past medical history of Asthma, Cervical Disc Displacement, Chronic Bronchitis, Depression, GERD (Gastroesophageal Reflux Disease), Hyperlipidemia, Hypothyroidism, Osteopenia and spinal stenosis, who has been having increasing shortness of breath and cough for last 3-4 days. Patient also c/o fevers and body aches. Patient was seen by Dr. Mcwilliams in office today and received IM Steroids and DuoNeb. Patient continued to have shortness of breath so she came in to ER. She is c/o cough, which is not productive of sputum.     Patient is being admitted for further care, as she failed out patient treatment.     Patient denies any orthopnea or PND.   + Fever.   No dizziness or syncope. (15 Alonso 2018 16:42)        PAST MEDICAL & SURGICAL HISTORY:  Depression  Hypothyroidism  Spinal stenosis  GERD (Gastroesophageal Reflux Disease)  Chronic Bronchitis  Asthma: trigger getting a cold. hospitalized multipl  times last 2007 denies intubation  Hyperlipidemia  Osteopenia  Cervical Disc Displacement: current diagnosis  Cervical vertebral fusion  S/P Colonoscopy: 2005  wnl  S/P Foot Surgery: ORIF left foot  S/P Cholecystectomy: open   1989  Kyphosis  Termination of Pregnancy: x3  remote        ICU Vital Signs Last 24 Hrs  T(C): 36.9 (20 Jan 2018 05:26), Max: 37 (19 Jan 2018 17:17)  T(F): 98.4 (20 Jan 2018 05:26), Max: 98.6 (19 Jan 2018 17:17)  HR: 64 (20 Jan 2018 07:55) (59 - 69)  BP: 124/84 (20 Jan 2018 05:26) (124/74 - 133/78)  BP(mean): --  ABP: --  ABP(mean): --  RR: 20 (20 Jan 2018 05:26) (18 - 20)  SpO2: 96% (20 Jan 2018 07:55) (93% - 98%)    Qtts:     I&O's Summary    19 Jan 2018 07:01  -  20 Jan 2018 07:00  --------------------------------------------------------  IN: 300 mL / OUT: 0 mL / NET: 300 mL        REVIEW OF SYSTEMS:    CONSTITUTIONAL: No fever, weight loss, or fatigue  EYES: No eye pain, visual disturbances, or discharge  ENMT:  No difficulty hearing, tinnitus, vertigo; No sinus or throat pain  NECK: No pain or stiffness  BREASTS: No pain, masses, or nipple discharge  RESPIRATORY: has cough, wheezing, No chills or hemoptysis; mild shortness of breath  CARDIOVASCULAR: No chest pain, palpitations, dizziness, or leg swelling  GASTROINTESTINAL: No abdominal or epigastric pain. No nausea, vomiting, or hematemesis; No diarrhea or constipation. No melena or hematochezia.  GENITOURINARY: No dysuria, frequency, hematuria, or incontinence  NEUROLOGICAL: No headaches, memory loss, loss of strength, numbness, or tremors  SKIN: No itching, burning, rashes, or lesions   LYMPH NODES: No enlarged glands  ENDOCRINE: No heat or cold intolerance; No hair loss  MUSCULOSKELETAL: No joint pain or swelling; No muscle, back, or extremity pain, no calf tenderness  PSYCHIATRIC: No depression, anxiety, mood swings, or difficulty sleeping  HEME/LYMPH: No easy bruising, or bleeding gums  ALLERGY AND IMMUNOLOGIC: No hives or eczema      PHYSICAL EXAM:    GENERAL: NAD, well-groomed, well-developed, NAD  HEAD:  Atraumatic, Normocephalic  EYES: EOMI, PERRLA, conjunctiva and sclera clear  ENMT: No tonsillar erythema, exudates, or enlargement; Moist mucous membranes, Good dentition, No lesions  NECK: Supple, No JVD, Normal thyroid  NERVOUS SYSTEM:  Alert & Oriented X3, Good concentration; Motor Strength 5/5 B/L upper and lower extremities  CHEST/LUNG: few bilat rhonchi, wheezing,  Good excursion, no rubs  HEART: Regular rate and rhythm; No murmurs, rubs, or gallops  ABDOMEN: Soft, Nontender, Nondistended; Bowel sounds present  EXTREMITIES:  2+ Peripheral Pulses, No clubbing, cyanosis, or edema  LYMPH: No lymphadenopathy noted  SKIN: No rashes or lesions      LABS:                        13.1   13.6  )-----------( 408      ( 20 Jan 2018 07:19 )             38.9                 vanco through     RADIOLOGY & ADDITIONAL STUDIES:      CRITICAL CARE TIME SPENT:

## 2018-01-20 NOTE — PROGRESS NOTE ADULT - SUBJECTIVE AND OBJECTIVE BOX
Patient is a 62y old  Female who presents with a chief complaint of flu (15 Alonso 2018 18:32)    HPI:  62 years old female with past medical history of Asthma, Cervical Disc Displacement, Chronic Bronchitis, Depression, GERD (Gastroesophageal Reflux Disease), Hyperlipidemia, Hypothyroidism, Osteopenia and spinal stenosis, who has been having increasing shortness of breath and cough for last 3-4 days. Patient also c/o fevers and body aches. Patient was seen by Dr. Mcwilliams in office today and received IM Steroids and DuoNeb. Patient continued to have shortness of breath so she came in to ER. She is c/o cough, which is not productive of sputum.     Patient is being admitted for further care, as she failed out patient treatment.     Patient denies any orthopnea or PND.   + Fever.   No dizziness or syncope. (15 Alonso 2018 16:42)    INTERVAL HPI/OVERNIGHT EVENTS:  Chart reviwed, notes reviewed.  Patient seen and examined.  Patient still having cough and shortness of breath.   Denies any chest pain or pressure.   C/O mouth soreness.     MEDICATIONS  (STANDING):  ALBUTerol/ipratropium for Nebulization 3 milliLiter(s) Nebulizer every 6 hours  atorvastatin 80 milliGRAM(s) Oral at bedtime  azithromycin   Tablet 250 milliGRAM(s) Oral daily  buDESOnide 160 MICROgram(s)/formoterol 4.5 MICROgram(s) Inhaler 2 Puff(s) Inhalation two times a day  docusate sodium 100 milliGRAM(s) Oral three times a day  enoxaparin Injectable 40 milliGRAM(s) SubCutaneous every 24 hours  escitalopram 10 milliGRAM(s) Oral daily  FIRST- Mouthwash  BLM 5 milliLiter(s) Swish and Swallow four times a day  gabapentin 600 milliGRAM(s) Oral three times a day  guaiFENesin    Syrup 200 milliGRAM(s) Oral every 6 hours  levothyroxine 50 MICROGram(s) Oral daily  montelukast 10 milliGRAM(s) Oral at bedtime  pantoprazole    Tablet 40 milliGRAM(s) Oral before breakfast  polyethylene glycol 3350 17 Gram(s) Oral two times a day  predniSONE   Tablet 50 milliGRAM(s) Oral daily  senna 2 Tablet(s) Oral at bedtime  tiotropium 18 MICROgram(s) Capsule 1 Capsule(s) Inhalation daily    MEDICATIONS  (PRN):  acetaminophen   Tablet. 650 milliGRAM(s) Oral every 6 hours PRN Mild Pain (1 - 3)  ALBUTerol    90 MICROgram(s) HFA Inhaler 2 Puff(s) Inhalation every 6 hours PRN Shortness of Breath and/or Wheezing  ALBUTerol/ipratropium for Nebulization 3 milliLiter(s) Nebulizer every 4 hours PRN Shortness of Breath and/or Wheezing  benzonatate 100 milliGRAM(s) Oral every 8 hours PRN Cough  bisacodyl Suppository 10 milliGRAM(s) Rectal daily PRN Constipation  magnesium hydroxide Suspension 30 milliLiter(s) Oral daily PRN Constipation  traMADol 50 milliGRAM(s) Oral every 6 hours PRN Moderate Pain (4 - 6)  traMADol 100 milliGRAM(s) Oral every 6 hours PRN Severe Pain (7 - 10)  zolpidem 5 milliGRAM(s) Oral at bedtime PRN Insomnia    Allergies: No Known Allergies    Intolerances    REVIEW OF SYSTEMS:  CONSTITUTIONAL: + fever, + fatigue  EYES: No eye pain, visual disturbances, or discharge  ENMT:  No difficulty hearing, tinnitus, vertigo; No sinus or throat pain  NECK: No pain or stiffness  BREASTS: No pain, masses, or nipple discharge  RESPIRATORY: + cough, + wheezing, + shortness of breath  CARDIOVASCULAR: No chest pain, palpitations, dizziness, or leg swelling  GASTROINTESTINAL: No abdominal or epigastric pain. No nausea, vomiting, or hematemesis; No diarrhea or constipation. No melena or hematochezia.  GENITOURINARY: No dysuria, frequency, hematuria, or incontinence  NEUROLOGICAL: No headaches, memory loss, loss of strength, numbness, or tremors  SKIN: No itching, burning, rashes, or lesions   LYMPH NODES: No enlarged glands  ENDOCRINE: No heat or cold intolerance; No hair loss; No polydipsia or polyuria  MUSCULOSKELETAL: No back pain  PSYCHIATRIC: No depression, anxiety, mood swings, or difficulty sleeping  HEME/LYMPH: No easy bruising, or bleeding gums  ALLERGY AND IMMUNOLOGIC: No hives or eczema    Vital Signs Last 24 Hrs  T(C): 36.4 (20 Jan 2018 13:55), Max: 37 (19 Jan 2018 17:17)  T(F): 97.6 (20 Jan 2018 13:55), Max: 98.6 (19 Jan 2018 17:17)  HR: 77 (20 Jan 2018 13:55) (59 - 77)  BP: 121/77 (20 Jan 2018 13:55) (121/77 - 133/78)  BP(mean): --  RR: 19 (20 Jan 2018 13:55) (19 - 20)  SpO2: 98% (20 Jan 2018 13:55) (93% - 99%)    PHYSICAL EXAM:  GENERAL: NAD, well-groomed, well-developed  HEAD:  Atraumatic, Normocephalic  EYES: EOMI, PERRLA, conjunctiva and sclera clear  ENMT: No tonsillar erythema, exudates, or enlargement; Moist mucous membranes, Good dentition, No lesions  NECK: Supple, No JVD, Normal thyroid  NERVOUS SYSTEM:  Alert & Oriented X3, Good concentration; Bilateral LE mobile, sensation to light touch intact  CHEST/LUNG: Bilateral expiratory wheeze +. No rhonchi.    HEART: Regular rate and rhythm; No murmurs, rubs, or gallops  ABDOMEN: Soft, Nontender, Nondistended; Bowel sounds present  EXTREMITIES:  2+ Peripheral Pulses, No clubbing or cyanosis  LYMPH: No lymphadenopathy noted  SKIN: No rashes or lesions    LABS:                                  13.1   13.6  )-----------( 408      ( 20 Jan 2018 07:19 )             38.9     20 Jan 2018 07:17    138    |  102    |  15     ----------------------------<  106    4.1     |  28     |  0.82     Ca    8.9        20 Jan 2018 07:17    01-15 WgetfjgeddV8C 6.0    01-15 Chol 215<H>  HDL 82 Trig 139    Thyroid Stimulating Hormone, Serum: 0.86 uIU/mL (01-15 @ 23:15)    RADIOLOGY & ADDITIONAL TESTS:  < from: US Transthoracic Echocardiogram w/Doppler Complete (01.16.18 @ 09:04) >  EXAM:  US TTE W DOPPLER COMPLETE                        PROCEDURE DATE:  01/16/2018    INTERPRETATION:  Ordering Physician: MARIO FITCH [id number]    Indication: Shortness of breath    CONCLUSIONS:    1. Left ventricle is normal in size and wall thickness. Left ventricular   ejection fraction is estimated at approximately 70%.  2. Mild left ventricular diastolic dysfunction.  3. Mild left atrial enlargement.  4. Normal right ventricular size and systolic function.  5. Thickened mitral valve without stenosis. There is  mitral   regurgitation appearing mild in degree by limited Doppler study  6. Thickened aortic valve without stenosis. Aortic insufficiency   appearing mild to moderate in degree by limited Doppler evaluation  7. Mild tricuspid regurgitation.  8. Trace pulmonic insufficiency.    < end of copied text >    Imaging Personally Reviewed:  [X] YES      Consultant(s) Notes Reviewed:  Yes    Care Discussed with Consultants/Other Providers: Yes

## 2018-01-21 ENCOUNTER — TRANSCRIPTION ENCOUNTER (OUTPATIENT)
Age: 63
End: 2018-01-21

## 2018-01-21 VITALS — WEIGHT: 184.97 LBS

## 2018-01-21 LAB
CULTURE RESULTS: SIGNIFICANT CHANGE UP
CULTURE RESULTS: SIGNIFICANT CHANGE UP
SPECIMEN SOURCE: SIGNIFICANT CHANGE UP
SPECIMEN SOURCE: SIGNIFICANT CHANGE UP

## 2018-01-21 PROCEDURE — 80061 LIPID PANEL: CPT

## 2018-01-21 PROCEDURE — 85027 COMPLETE CBC AUTOMATED: CPT

## 2018-01-21 PROCEDURE — 87400 INFLUENZA A/B EACH AG IA: CPT

## 2018-01-21 PROCEDURE — 84436 ASSAY OF TOTAL THYROXINE: CPT

## 2018-01-21 PROCEDURE — 94640 AIRWAY INHALATION TREATMENT: CPT

## 2018-01-21 PROCEDURE — 93306 TTE W/DOPPLER COMPLETE: CPT

## 2018-01-21 PROCEDURE — 93005 ELECTROCARDIOGRAM TRACING: CPT

## 2018-01-21 PROCEDURE — 84443 ASSAY THYROID STIM HORMONE: CPT

## 2018-01-21 PROCEDURE — 85610 PROTHROMBIN TIME: CPT

## 2018-01-21 PROCEDURE — 83036 HEMOGLOBIN GLYCOSYLATED A1C: CPT

## 2018-01-21 PROCEDURE — 83605 ASSAY OF LACTIC ACID: CPT

## 2018-01-21 PROCEDURE — 80048 BASIC METABOLIC PNL TOTAL CA: CPT

## 2018-01-21 PROCEDURE — 94760 N-INVAS EAR/PLS OXIMETRY 1: CPT

## 2018-01-21 PROCEDURE — 82550 ASSAY OF CK (CPK): CPT

## 2018-01-21 PROCEDURE — 83735 ASSAY OF MAGNESIUM: CPT

## 2018-01-21 PROCEDURE — 99285 EMERGENCY DEPT VISIT HI MDM: CPT | Mod: 25

## 2018-01-21 PROCEDURE — 80053 COMPREHEN METABOLIC PANEL: CPT

## 2018-01-21 PROCEDURE — 71045 X-RAY EXAM CHEST 1 VIEW: CPT

## 2018-01-21 PROCEDURE — 87040 BLOOD CULTURE FOR BACTERIA: CPT

## 2018-01-21 PROCEDURE — 84480 ASSAY TRIIODOTHYRONINE (T3): CPT

## 2018-01-21 PROCEDURE — 84484 ASSAY OF TROPONIN QUANT: CPT

## 2018-01-21 PROCEDURE — 36415 COLL VENOUS BLD VENIPUNCTURE: CPT

## 2018-01-21 RX ORDER — AZITHROMYCIN 500 MG/1
1 TABLET, FILM COATED ORAL
Qty: 14 | Refills: 0 | OUTPATIENT
Start: 2018-01-21 | End: 2018-02-03

## 2018-01-21 RX ORDER — NYSTATIN 500MM UNIT
500000 POWDER (EA) MISCELLANEOUS
Qty: 0 | Refills: 0 | Status: DISCONTINUED | OUTPATIENT
Start: 2018-01-21 | End: 2018-01-21

## 2018-01-21 RX ORDER — ZOLPIDEM TARTRATE 10 MG/1
1 TABLET ORAL
Qty: 5 | Refills: 0 | OUTPATIENT
Start: 2018-01-21 | End: 2018-01-25

## 2018-01-21 RX ORDER — NYSTATIN 500MM UNIT
5 POWDER (EA) MISCELLANEOUS
Qty: 140 | Refills: 0 | OUTPATIENT
Start: 2018-01-21 | End: 2018-01-27

## 2018-01-21 RX ADMIN — POLYETHYLENE GLYCOL 3350 17 GRAM(S): 17 POWDER, FOR SOLUTION ORAL at 17:00

## 2018-01-21 RX ADMIN — ESCITALOPRAM OXALATE 10 MILLIGRAM(S): 10 TABLET, FILM COATED ORAL at 11:05

## 2018-01-21 RX ADMIN — Medication 200 MILLIGRAM(S): at 17:00

## 2018-01-21 RX ADMIN — Medication 3 MILLILITER(S): at 07:44

## 2018-01-21 RX ADMIN — Medication 50 MILLIGRAM(S): at 06:01

## 2018-01-21 RX ADMIN — BUDESONIDE AND FORMOTEROL FUMARATE DIHYDRATE 2 PUFF(S): 160; 4.5 AEROSOL RESPIRATORY (INHALATION) at 06:02

## 2018-01-21 RX ADMIN — GABAPENTIN 600 MILLIGRAM(S): 400 CAPSULE ORAL at 06:01

## 2018-01-21 RX ADMIN — Medication 100 MILLIGRAM(S): at 06:01

## 2018-01-21 RX ADMIN — DIPHENHYDRAMINE HYDROCHLORIDE AND LIDOCAINE HYDROCHLORIDE AND ALUMINUM HYDROXIDE AND MAGNESIUM HYDRO 5 MILLILITER(S): KIT at 11:05

## 2018-01-21 RX ADMIN — TIOTROPIUM BROMIDE 1 CAPSULE(S): 18 CAPSULE ORAL; RESPIRATORY (INHALATION) at 06:02

## 2018-01-21 RX ADMIN — Medication 200 MILLIGRAM(S): at 11:04

## 2018-01-21 RX ADMIN — Medication 3 MILLILITER(S): at 13:44

## 2018-01-21 RX ADMIN — Medication 100 MILLIGRAM(S): at 13:09

## 2018-01-21 RX ADMIN — Medication 500000 UNIT(S): at 13:09

## 2018-01-21 RX ADMIN — DIPHENHYDRAMINE HYDROCHLORIDE AND LIDOCAINE HYDROCHLORIDE AND ALUMINUM HYDROXIDE AND MAGNESIUM HYDRO 5 MILLILITER(S): KIT at 17:01

## 2018-01-21 RX ADMIN — DIPHENHYDRAMINE HYDROCHLORIDE AND LIDOCAINE HYDROCHLORIDE AND ALUMINUM HYDROXIDE AND MAGNESIUM HYDRO 5 MILLILITER(S): KIT at 06:01

## 2018-01-21 RX ADMIN — Medication 200 MILLIGRAM(S): at 06:01

## 2018-01-21 RX ADMIN — POLYETHYLENE GLYCOL 3350 17 GRAM(S): 17 POWDER, FOR SOLUTION ORAL at 06:00

## 2018-01-21 RX ADMIN — GABAPENTIN 600 MILLIGRAM(S): 400 CAPSULE ORAL at 13:09

## 2018-01-21 RX ADMIN — PANTOPRAZOLE SODIUM 40 MILLIGRAM(S): 20 TABLET, DELAYED RELEASE ORAL at 06:01

## 2018-01-21 RX ADMIN — AZITHROMYCIN 250 MILLIGRAM(S): 500 TABLET, FILM COATED ORAL at 11:05

## 2018-01-21 RX ADMIN — Medication 50 MICROGRAM(S): at 06:01

## 2018-01-21 RX ADMIN — Medication 500000 UNIT(S): at 17:01

## 2018-01-21 NOTE — DIETITIAN INITIAL EVALUATION ADULT. - PERTINENT MEDS FT
zithromax, lipitor, colace, miralax, senna, tylenol, lexparo, neurontin, synthroid, protonix, ultram, ambien

## 2018-01-21 NOTE — PROGRESS NOTE ADULT - PROBLEM SELECTOR PROBLEM 7
Spinal stenosis of lumbosacral region

## 2018-01-21 NOTE — DISCHARGE NOTE ADULT - SECONDARY DIAGNOSIS.
Bronchitis with asthma, acute Hyperlipidemia, unspecified hyperlipidemia type Hypothyroidism, unspecified type Gastroesophageal reflux disease, esophagitis presence not specified Depression, unspecified depression type Influenza A

## 2018-01-21 NOTE — PROGRESS NOTE ADULT - PROBLEM SELECTOR PLAN 6
Continue patient on Lexapro as per out patient dose.

## 2018-01-21 NOTE — PROGRESS NOTE ADULT - PROBLEM SELECTOR PROBLEM 3
Hypothyroidism, unspecified type

## 2018-01-21 NOTE — PROGRESS NOTE ADULT - PROBLEM SELECTOR PLAN 4
Will continue patient on PPI (Protonix for Nexium per hospital formulary)
Will continue patient on PPI (Protonix for Nexium per hospital formulary)
Resume Nexium on discharge.
Will continue patient on PPI (Protonix for Nexium per hospital formulary)

## 2018-01-21 NOTE — DISCHARGE NOTE ADULT - PLAN OF CARE
Breath better. Low cholesterol diet.  Increase activity as tolerated at home.   Complete course of prednisone and antibiotics as prescribed.   Follow up with Dr. Mcwilliams in 1 week.

## 2018-01-21 NOTE — PROGRESS NOTE ADULT - PROBLEM SELECTOR PLAN 7
Continue patient on Neurontin 600 mg TID.  Will place on Tylenol and Tramadol as needed for pain.
Continue patient on Neurontin 600 mg TID.
Continue patient on Neurontin 600 mg TID.  Will place on Tylenol and Tramadol as needed for pain.

## 2018-01-21 NOTE — PROGRESS NOTE ADULT - PROBLEM SELECTOR PROBLEM 2
Influenza A

## 2018-01-21 NOTE — DISCHARGE NOTE ADULT - HOSPITAL COURSE
Patient admitted with acute exacerbation of her asthma. She was found to have Influenza A and most likely had acute viral bronchitis due to Influenza with acute asthmatic bronchitis. She was treated with Tamiflu and IV steroids and bronchodilators. She was seen by ID and pulmonary. Patient was started on oral Azithromycin by pulmonary due to concern for bacterial bronchitis as her sputum turned yellow. Pulmonary recommends at least 2 weeks or longer or antibiotics.     Patient is slowly improving and is being discharged home on steroid taper and Azithromycin. She is advised follow up with Manisha Mcwilliams this week.

## 2018-01-21 NOTE — DISCHARGE NOTE ADULT - PATIENT PORTAL LINK FT
“You can access the Follow[facility] Patient Portal, offered by [facility], by registering with the following website: [email or web address]”

## 2018-01-21 NOTE — PROGRESS NOTE ADULT - PROBLEM SELECTOR PLAN 2
Patient with acute Influenza A.  Will place patient on Tamiflu.  Continue supportive care.  Droplet precautions.
Patient with acute Influenza A.  Continue patient on Tamiflu.  ID input noted and appreciated.   Continue supportive care.  Droplet precautions.
Patient with acute Influenza A. Finished Tamiflu  Continue supportive care.  Droplet precautions.
Patient with acute Influenza A.  Completed Tamiflu.
Patient with acute Influenza A.  Completed Tamiflu.
Patient with acute Influenza A.  Continue patient on Tamiflu.  ID input noted and appreciated.   Continue supportive care.  Droplet precautions.
Patient with acute Influenza A.  Continue patient on Tamiflu.  ID input noted and appreciated.   Continue supportive care.  Droplet precautions.
Patient with acute Influenza A. Finished Tamiflu  Continue supportive care.  Droplet precautions.
Patient with acute Influenza A. On Tamiflu.  Continue supportive care.  Droplet precautions.
Patient with acute Influenza A.  Continue patient on Tamiflu.  ID input noted and appreciated.   Continue supportive care.  Droplet precautions.

## 2018-01-21 NOTE — PROGRESS NOTE ADULT - PROBLEM SELECTOR PLAN 3
Continue Synthroid.  Check thyroid panel.
Continue Synthroid.  Thyroid panel noted.
Continue Synthroid.
Continue Synthroid.  Thyroid panel noted.
Continue Synthroid.
Continue Synthroid.  Thyroid panel noted.

## 2018-01-21 NOTE — PROGRESS NOTE ADULT - ATTENDING COMMENTS
Discharge planning.
Discharge planning.
Discharge planning.    Magic mouth wash for mouth soreness.
Stable for discharge.   I spent more than 35 minutes on discharging the patient.     [facility] ref # [id number]

## 2018-01-21 NOTE — DISCHARGE NOTE ADULT - MEDICATION SUMMARY - MEDICATIONS TO TAKE
I will START or STAY ON the medications listed below when I get home from the hospital:    predniSONE 10 mg oral tablet  -- 4 tab(s)  once a day x 3 days  3 tab(s)  once a day x 3 days  2 tab(s)  once a day x 3 days  1 tab(s)  once a day x 3 days  -- Indication: For Asthma with acute exacerbation, unspecified asthma severity, unspecified whether persistent    acetaminophen 325 mg oral tablet  -- 2 tab(s) by mouth every 6 hours, As needed, For Temp over 37.9 C (100.2 F)  -- Indication: For Pain or Fever    Neurontin 600 mg oral tablet  -- 1 tab(s) by mouth 3 times a day  -- Indication: For Neuropathy    escitalopram 10 mg oral tablet  -- 1 tab(s) by mouth once a day  -- Indication: For Depression, unspecified depression type    nystatin 100,000 units/mL oral suspension  -- 5 milliliter(s) by mouth 4 times a day  -- Indication: For Thrush    Crestor 20 mg oral tablet  -- 1 tab(s) by mouth once a day (at bedtime)  -- Indication: For Hyperlipidemia, unspecified hyperlipidemia type    benzonatate 100 mg oral capsule  -- 1 cap(s) by mouth every 8 hours, As needed, Cough  -- Indication: For Cough    zolpidem 5 mg oral tablet  -- 1 tab(s) by mouth once a day (at bedtime), As needed, Insomnia MDD:1  -- Indication: For Insomnia    Symbicort 160 mcg-4.5 mcg/inh inhalation aerosol  -- 2 puff(s) inhaled 2 times a day  -- Indication: For Asthma with acute exacerbation, unspecified asthma severity, unspecified whether persistent    Spiriva 18 mcg inhalation capsule  -- 1 each inhaled once a day (at bedtime)  -- Indication: For Asthma with acute exacerbation, unspecified asthma severity, unspecified whether persistent    albuterol-ipratropium 2.5 mg-0.5 mg/3 mL inhalation solution  -- 3 milliliter(s) inhaled every 4 hours, As Needed  -- For shortness of breath  -- Indication: For Asthma with acute exacerbation, unspecified asthma severity, unspecified whether persistent    Ventolin CFC free 90 mcg/inh inhalation aerosol  -- 2 puff(s) inhaled 4 times a day, As Needed  -- Indication: For Asthma with acute exacerbation, unspecified asthma severity, unspecified whether persistent    guaiFENesin 100 mg/5 mL oral liquid  -- 10 milliliter(s) by mouth every 6 hours  -- Indication: For Cough    bisacodyl 10 mg rectal suppository  -- 1 suppository(ies) rectally once a day, As needed, Constipation  -- Indication: For Constipation, unspecified constipation type    Singulair 10 mg oral tablet  -- 1 tab(s) by mouth once a day (in the evening)  -- Indication: For Asthma with acute exacerbation, unspecified asthma severity, unspecified whether persistent    azithromycin 250 mg oral tablet  -- 1 tab(s) by mouth once a day  -- Indication: For Acute bronchitis    NexIUM 20 mg oral delayed release capsule  -- 1 cap(s) by mouth once a day  -- Indication: For GERD    Synthroid 50 mcg (0.05 mg) oral tablet  -- 1 tab(s) by mouth once a day  -- Indication: For Hypothyroidism, unspecified type

## 2018-01-21 NOTE — PROGRESS NOTE ADULT - PROBLEM SELECTOR PLAN 8
Lovenox for DVT prophylaxis.

## 2018-01-21 NOTE — DISCHARGE NOTE ADULT - NS AS ACTIVITY OBS
Do not drive or operate machinery/Walking-Outdoors allowed/No Heavy lifting/straining/Do not make important decisions/Stairs allowed/Bathing allowed/Showering allowed/Walking-Indoors allowed

## 2018-01-21 NOTE — PROGRESS NOTE ADULT - PROBLEM SELECTOR PROBLEM 5
Hyperlipidemia, unspecified hyperlipidemia type

## 2018-01-21 NOTE — PROGRESS NOTE ADULT - PROBLEM SELECTOR PROBLEM 4
Gastroesophageal reflux disease, esophagitis presence not specified

## 2018-01-21 NOTE — DISCHARGE NOTE ADULT - CARE PROVIDER_API CALL
Russ Mcwilliams (MD), Critical Care Medicine; Internal Medicine; Pulmonary Disease  [address] [place] [address]  Phone: [phone]  Fax: [phone]    Wil Ross (DO), [place] [address]  Phone: [phone]  Fax: [phone]

## 2018-01-21 NOTE — PROGRESS NOTE ADULT - PROBLEM SELECTOR PROBLEM 6
Depression, unspecified depression type

## 2018-01-21 NOTE — DISCHARGE NOTE ADULT - MEDICATION SUMMARY - MEDICATIONS TO CHANGE
I will SWITCH the dose or number of times a day I take the medications listed below when I get home from the hospital:    predniSONE 20 mg oral tablet  -- 2 tab(s) by mouth once a day

## 2018-01-21 NOTE — PROGRESS NOTE ADULT - PROBLEM SELECTOR PLAN 9
continue  on Colace, Senna, Miralax.
Will place patient on Colace, Senna, Miralax.
Will place patient on Colace, Senna, Miralax.
continue  on Colace, Senna, Miralax.

## 2018-01-21 NOTE — PROGRESS NOTE ADULT - PROBLEM SELECTOR PLAN 5
Check Lipid panel.
Lipid panel is noted.   Will place on Lipitor for Crestor.
Resume Crestor on discharge.
Lipid panel is noted.   Will place on Lipitor for Crestor.

## 2018-01-21 NOTE — PROGRESS NOTE ADULT - PROBLEM SELECTOR PLAN 1
Patient with acute exacerbation of asthma with asthmatic bronchitis, most likely due to acute influenza A.   Will place patient on Solumedrol and Duoneb.  Continue Symbicort and Spiriva.   Pulmonary follow up.   Further management as per patient's clinical course.
Patient with acute exacerbation of asthma with asthmatic bronchitis, most likely due to acute influenza A.   Continue patient on Steroids and Duoneb.  Continue Symbicort and Spiriva.   Wean steroids as per pulmonary.   Pulmonary follow up.   Will DC once OK with pulmonary.
Patient with acute exacerbation of asthma with asthmatic bronchitis, most likely due to acute influenza A.   Will place patient on Duoneb.  Taper steroids  Continue Symbicort and Spiriva.   Pulmonary follow up.   Further management as per patient's clinical course.  Peak flow  Zithromax maintainence for next few weeks.
Patient with acute exacerbation of asthma with asthmatic bronchitis, most likely due to acute influenza A.   Continue patient on Solumedrol and Duoneb.  Continue Symbicort and Spiriva.   Pulmonary follow up.   Further management as per patient's clinical course.
Patient with acute exacerbation of asthma with asthmatic bronchitis, most likely due to acute influenza A.   Continue patient on Solumedrol and Duoneb.  Continue Symbicort and Spiriva.   Pulmonary follow up.   Further management as per patient's clinical course.
Patient with acute exacerbation of asthma with asthmatic bronchitis, most likely due to acute influenza A.   Continue patient on Steroids and Duoneb.  Continue Symbicort and Spiriva.   Wean steroids as per pulmonary.   Improving very slowly.   Started on oral antibiotics by pulmonary.
Patient with acute exacerbation of asthma with asthmatic bronchitis, most likely due to acute influenza A.   On Duoneb.  Taper steroids  Continue Symbicort and Spiriva.   Pulmonary follow up.   Further management as per patient's clinical course.  Peak flow  Zithromax maintainence for next few weeks.
Patient with acute exacerbation of asthma with asthmatic bronchitis, most likely due to acute influenza A.   Will DC home on Steroid taper and Azithromycin oral.   Out patient pulmonary follow up.
Patient with acute exacerbation of asthma with asthmatic bronchitis, most likely due to acute influenza A.   Will place patient on Duoneb.  Taper steroids  Continue Symbicort and Spiriva.   Pulmonary follow up.   Further management as per patient's clinical course.  Peak flow  Zithromax maintainence for next few weeks.
Patient with acute exacerbation of asthma with asthmatic bronchitis, most likely due to acute influenza A.   Will place patient on Solumedrol and Duoneb.  Continue Symbicort and Spiriva.   Pulmonary follow up.   Further management as per patient's clinical course.  Peak flow
Patient with acute exacerbation of asthma with asthmatic bronchitis, most likely due to acute influenza A.   Will place patient on Duoneb.  Taper steroids  Continue Symbicort and Spiriva.   Pulmonary follow up.   Further management as per patient's clinical course.  Peak flow
Patient with acute exacerbation of asthma with asthmatic bronchitis, most likely due to acute influenza A.   Continue patient on Steroids and Duoneb.  Continue Symbicort and Spiriva.   Wean steroids as per pulmonary.   Improving very slowly.   Started on oral antibiotics by pulmonary.

## 2018-01-21 NOTE — PROGRESS NOTE ADULT - SUBJECTIVE AND OBJECTIVE BOX
PULMONARY/CRITICAL CARE      INTERVAL HPI/OVERNIGHT EVENTS:  Pt. improved, less sob. Alert, nad. Some wheeze. Ambulated.  62y FemaleHPI:  62 years old female with past medical history of Asthma, Cervical Disc Displacement, Chronic Bronchitis, Depression, GERD (Gastroesophageal Reflux Disease), Hyperlipidemia, Hypothyroidism, Osteopenia and spinal stenosis, who has been having increasing shortness of breath and cough for last 3-4 days. Patient also c/o fevers and body aches. Patient was seen by Dr. Mcwilliams in office today and received IM Steroids and DuoNeb. Patient continued to have shortness of breath so she came in to ER. She is c/o cough, which is not productive of sputum.     Patient is being admitted for further care, as she failed out patient treatment.     Patient denies any orthopnea or PND.   + Fever.   No dizziness or syncope. (15 Alonso 2018 16:42)        PAST MEDICAL & SURGICAL HISTORY:  Depression  Hypothyroidism  Spinal stenosis  GERD (Gastroesophageal Reflux Disease)  Chronic Bronchitis  Asthma: trigger getting a cold. hospitalized multipl  times last 2007 denies intubation  Hyperlipidemia  Osteopenia  Cervical Disc Displacement: current diagnosis  Cervical vertebral fusion  S/P Colonoscopy: 2005  wnl  S/P Foot Surgery: ORIF left foot  S/P Cholecystectomy: open   1989  Kyphosis  Termination of Pregnancy: x3  remote        ICU Vital Signs Last 24 Hrs  T(C): 37 (21 Jan 2018 05:24), Max: 37 (21 Jan 2018 05:24)  T(F): 98.6 (21 Jan 2018 05:24), Max: 98.6 (21 Jan 2018 05:24)  HR: 58 (21 Jan 2018 05:24) (58 - 83)  BP: 121/67 (21 Jan 2018 05:24) (121/67 - 143/69)  BP(mean): --  ABP: --  ABP(mean): --  RR: 18 (21 Jan 2018 05:24) (18 - 19)  SpO2: 95% (21 Jan 2018 05:24) (95% - 100%)    Qtts:     I&O's Summary    REVIEW OF SYSTEMS:    CONSTITUTIONAL: No fever, weight loss, or fatigue  EYES: No eye pain, visual disturbances, or discharge  ENMT:  No difficulty hearing, tinnitus, vertigo; No sinus or throat pain  NECK: No pain or stiffness  BREASTS: No pain, masses, or nipple discharge  RESPIRATORY: has cough, wheezing, No chills or hemoptysis; mild shortness of breath  CARDIOVASCULAR: No chest pain, palpitations, dizziness, or leg swelling  GASTROINTESTINAL: No abdominal or epigastric pain. No nausea, vomiting, or hematemesis; No diarrhea or constipation. No melena or hematochezia.  GENITOURINARY: No dysuria, frequency, hematuria, or incontinence  NEUROLOGICAL: No headaches, memory loss, loss of strength, numbness, or tremors  SKIN: No itching, burning, rashes, or lesions   LYMPH NODES: No enlarged glands  ENDOCRINE: No heat or cold intolerance; No hair loss  MUSCULOSKELETAL: No joint pain or swelling; No muscle, back, or extremity pain, no calf tenderness  PSYCHIATRIC: No depression, anxiety, mood swings, or difficulty sleeping  HEME/LYMPH: No easy bruising, or bleeding gums  ALLERGY AND IMMUNOLOGIC: No hives or eczema      PHYSICAL EXAM:    GENERAL: NAD, well-groomed, well-developed, NAD  HEAD:  Atraumatic, Normocephalic  EYES: EOMI, PERRLA, conjunctiva and sclera clear  ENMT: No tonsillar erythema, exudates, or enlargement; Moist mucous membranes, Good dentition, No lesions  NECK: Supple, No JVD, Normal thyroid  NERVOUS SYSTEM:  Alert & Oriented X3, Good concentration; Motor Strength 5/5 B/L upper and lower extremities  CHEST/LUNG: few bilat rhonchi, wheezing,  Good excursion, no rubs  HEART: Regular rate and rhythm; No murmurs, rubs, or gallops  ABDOMEN: Soft, Nontender, Nondistended; Bowel sounds present  EXTREMITIES:  2+ Peripheral Pulses, No clubbing, cyanosis, or edema  LYMPH: No lymphadenopathy noted  SKIN: No rashes or lesions              LABS:                        13.1   13.6  )-----------( 408      ( 20 Jan 2018 07:19 )             38.9     01-20    138  |  102  |  15  ----------------------------<  106<H>  4.1   |  28  |  0.82    Ca    8.9      20 Jan 2018 07:17            vanco through     RADIOLOGY & ADDITIONAL STUDIES:      CRITICAL CARE TIME SPENT:

## 2018-01-21 NOTE — DISCHARGE NOTE ADULT - CARE PLAN
Principal Discharge DX:	Asthma with acute exacerbation, unspecified asthma severity, unspecified whether persistent  Goal:	Breath better.  Assessment and plan of treatment:	Low cholesterol diet.  Increase activity as tolerated at home.   Complete course of prednisone and antibiotics as prescribed.   Follow up with Dr. Mcwilliams in 1 week.  Secondary Diagnosis:	Influenza A  Secondary Diagnosis:	Bronchitis with asthma, acute  Secondary Diagnosis:	Hyperlipidemia, unspecified hyperlipidemia type  Secondary Diagnosis:	Hypothyroidism, unspecified type  Secondary Diagnosis:	Gastroesophageal reflux disease, esophagitis presence not specified  Secondary Diagnosis:	Depression, unspecified depression type

## 2018-01-21 NOTE — DIETITIAN INITIAL EVALUATION ADULT. - OTHER INFO
61yo female pt admitted with primary dx of acute bronchitis presented with SOB and cough. Reports # and no recent weight or appetite changes PTA. Reports constipation x2 days, laxatives as Rx, pt stated she is trying to eat a lot of fruits/veggies, same and PO fluids encouraged. Will receive stewed prunes at dinner. No edema or pressure ulcers noted. PMH as below:

## 2018-01-21 NOTE — PROGRESS NOTE ADULT - PROBLEM SELECTOR PROBLEM 1
Asthma with acute exacerbation, unspecified asthma severity, unspecified whether persistent

## 2018-01-21 NOTE — PROGRESS NOTE ADULT - SUBJECTIVE AND OBJECTIVE BOX
Patient is a 62y old  Female who presents with a chief complaint of flu (15 Alonso 2018 18:32)    HPI:  62 years old female with past medical history of Asthma, Cervical Disc Displacement, Chronic Bronchitis, Depression, GERD (Gastroesophageal Reflux Disease), Hyperlipidemia, Hypothyroidism, Osteopenia and spinal stenosis, who has been having increasing shortness of breath and cough for last 3-4 days. Patient also c/o fevers and body aches. Patient was seen by Dr. Mcwilliams in office today and received IM Steroids and DuoNeb. Patient continued to have shortness of breath so she came in to ER. She is c/o cough, which is not productive of sputum.     Patient is being admitted for further care, as she failed out patient treatment.     Patient denies any orthopnea or PND.   + Fever.   No dizziness or syncope. (15 Alonso 2018 16:42)    INTERVAL HPI/OVERNIGHT EVENTS:  Chart reviwed, notes reviewed.  Patient seen and examined.  Patient still having cough and shortness of breath.   Denies any chest pain or pressure.       MEDICATIONS  (STANDING):  ALBUTerol/ipratropium for Nebulization 3 milliLiter(s) Nebulizer every 6 hours  atorvastatin 80 milliGRAM(s) Oral at bedtime  azithromycin   Tablet 250 milliGRAM(s) Oral daily  buDESOnide 160 MICROgram(s)/formoterol 4.5 MICROgram(s) Inhaler 2 Puff(s) Inhalation two times a day  docusate sodium 100 milliGRAM(s) Oral three times a day  enoxaparin Injectable 40 milliGRAM(s) SubCutaneous every 24 hours  escitalopram 10 milliGRAM(s) Oral daily  FIRST- Mouthwash  BLM 5 milliLiter(s) Swish and Swallow four times a day  gabapentin 600 milliGRAM(s) Oral three times a day  guaiFENesin    Syrup 200 milliGRAM(s) Oral every 6 hours  levothyroxine 50 MICROGram(s) Oral daily  montelukast 10 milliGRAM(s) Oral at bedtime  nystatin    Suspension 643103 Unit(s) Oral four times a day  pantoprazole    Tablet 40 milliGRAM(s) Oral before breakfast  polyethylene glycol 3350 17 Gram(s) Oral two times a day  predniSONE   Tablet 50 milliGRAM(s) Oral daily  senna 2 Tablet(s) Oral at bedtime  tiotropium 18 MICROgram(s) Capsule 1 Capsule(s) Inhalation daily    MEDICATIONS  (PRN):  acetaminophen   Tablet. 650 milliGRAM(s) Oral every 6 hours PRN Mild Pain (1 - 3)  ALBUTerol    90 MICROgram(s) HFA Inhaler 2 Puff(s) Inhalation every 6 hours PRN Shortness of Breath and/or Wheezing  ALBUTerol/ipratropium for Nebulization 3 milliLiter(s) Nebulizer every 4 hours PRN Shortness of Breath and/or Wheezing  benzonatate 100 milliGRAM(s) Oral every 8 hours PRN Cough  bisacodyl Suppository 10 milliGRAM(s) Rectal daily PRN Constipation  magnesium hydroxide Suspension 30 milliLiter(s) Oral daily PRN Constipation  traMADol 50 milliGRAM(s) Oral every 6 hours PRN Moderate Pain (4 - 6)  traMADol 100 milliGRAM(s) Oral every 6 hours PRN Severe Pain (7 - 10)  zolpidem 5 milliGRAM(s) Oral at bedtime PRN Insomnia    Allergies: No Known Allergies    Intolerances    REVIEW OF SYSTEMS:  CONSTITUTIONAL: + fever, + fatigue  EYES: No eye pain, visual disturbances, or discharge  ENMT:  No difficulty hearing, tinnitus, vertigo; No sinus or throat pain  NECK: No pain or stiffness  BREASTS: No pain, masses, or nipple discharge  RESPIRATORY: + cough, + wheezing, + shortness of breath  CARDIOVASCULAR: No chest pain, palpitations, dizziness, or leg swelling  GASTROINTESTINAL: No abdominal or epigastric pain. No nausea, vomiting, or hematemesis; No diarrhea or constipation. No melena or hematochezia.  GENITOURINARY: No dysuria, frequency, hematuria, or incontinence  NEUROLOGICAL: No headaches, memory loss, loss of strength, numbness, or tremors  SKIN: No itching, burning, rashes, or lesions   LYMPH NODES: No enlarged glands  ENDOCRINE: No heat or cold intolerance; No hair loss; No polydipsia or polyuria  MUSCULOSKELETAL: No back pain  PSYCHIATRIC: No depression, anxiety, mood swings, or difficulty sleeping  HEME/LYMPH: No easy bruising, or bleeding gums  ALLERGY AND IMMUNOLOGIC: No hives or eczema    Vital Signs Last 24 Hrs  T(C): 36.9 (21 Jan 2018 13:25), Max: 37 (21 Jan 2018 05:24)  T(F): 98.5 (21 Jan 2018 13:25), Max: 98.6 (21 Jan 2018 05:24)  HR: 70 (21 Jan 2018 13:44) (58 - 83)  BP: 138/74 (21 Jan 2018 13:25) (115/67 - 143/69)  BP(mean): --  RR: 18 (21 Jan 2018 13:25) (18 - 19)  SpO2: 93% (21 Jan 2018 13:25) (93% - 100%)    PHYSICAL EXAM:  GENERAL: NAD, well-groomed, well-developed  HEAD:  Atraumatic, Normocephalic  EYES: EOMI, PERRLA, conjunctiva and sclera clear  ENMT: No tonsillar erythema, exudates, or enlargement; Moist mucous membranes, Good dentition, No lesions  NECK: Supple, No JVD, Normal thyroid  NERVOUS SYSTEM:  Alert & Oriented X3, Good concentration; Bilateral LE mobile, sensation to light touch intact  CHEST/LUNG: Bilateral expiratory wheeze +. No rhonchi.    HEART: Regular rate and rhythm; No murmurs, rubs, or gallops  ABDOMEN: Soft, Nontender, Nondistended; Bowel sounds present  EXTREMITIES:  2+ Peripheral Pulses, No clubbing or cyanosis  LYMPH: No lymphadenopathy noted  SKIN: No rashes or lesions    LABS:                                                 13.1   13.6  )-----------( 408      ( 20 Jan 2018 07:19 )             38.9     20 Jan 2018 07:17    138    |  102    |  15     ----------------------------<  106    4.1     |  28     |  0.82     Ca    8.9        20 Jan 2018 07:17    01-15 [id number] 6.0    01-15 Chol 215<H>  HDL 82 Trig 139    Thyroid Stimulating Hormone, Serum: 0.86 uIU/mL (01-15 @ 23:15)    RADIOLOGY & ADDITIONAL TESTS:  < from: US Transthoracic Echocardiogram w/Doppler Complete (01.16.18 @ 09:04) >  EXAM:  US TTE W DOPPLER COMPLETE                        PROCEDURE DATE:  01/16/2018    INTERPRETATION:  Ordering Physician: MARIO FITCH [id number]    Indication: Shortness of breath    CONCLUSIONS:    1. Left ventricle is normal in size and wall thickness. Left ventricular   ejection fraction is estimated at approximately 70%.  2. Mild left ventricular diastolic dysfunction.  3. Mild left atrial enlargement.  4. Normal right ventricular size and systolic function.  5. Thickened mitral valve without stenosis. There is  mitral   regurgitation appearing mild in degree by limited Doppler study  6. Thickened aortic valve without stenosis. Aortic insufficiency   appearing mild to moderate in degree by limited Doppler evaluation  7. Mild tricuspid regurgitation.  8. Trace pulmonic insufficiency.    < end of copied text >    Imaging Personally Reviewed:  [X] YES      Consultant(s) Notes Reviewed:  Yes    Care Discussed with Consultants/Other Providers: Yes

## 2018-01-21 NOTE — PROGRESS NOTE ADULT - ASSESSMENT
62 years old female with past medical history of Asthma, Cervical Disc Displacement, Chronic Bronchitis, Depression, GERD (Gastroesophageal Reflux Disease), Hyperlipidemia, Hypothyroidism, Osteopenia and spinal stenosis, who has been having increasing shortness of breath and cough for last 3-4 days. Patient also c/o fevers and body aches. Patient was seen by Dr. Mcwilliams in office today and received IM Steroids and DuoNeb. Patient continued to have shortness of breath so she came in to ER. She is c/o cough, which is not productive of sputum.       Has Influenza bronchitis/ exac Asthma  Still wheezing alot
62 years old female with past medical history of Asthma, Cervical Disc Displacement, Chronic Bronchitis, Depression, GERD (Gastroesophageal Reflux Disease), Hyperlipidemia, Hypothyroidism, Osteopenia and spinal stenosis, who has been having increasing shortness of breath and cough for last 3-4 days. Patient also c/o fevers and body aches. Patient was seen by Dr. Mcwilliams in office today and received IM Steroids and DuoNeb. Patient continued to have shortness of breath so she came in to ER. She is c/o cough, which is not productive of sputum.     Patient is being admitted for further care.
62 years old female with past medical history of Asthma, Cervical Disc Displacement, Chronic Bronchitis, Depression, GERD (Gastroesophageal Reflux Disease), Hyperlipidemia, Hypothyroidism, Osteopenia and spinal stenosis, who has been having increasing shortness of breath and cough for last 3-4 days. Patient also c/o fevers and body aches. Patient was seen by Dr. Mcwilliams in office today and received IM Steroids and DuoNeb. Patient continued to have shortness of breath so she came in to ER. She is c/o cough, which is not productive of sputum.       Has Influenza bronchitis/ exac Asthma  Still wheezing alot
62 years old female with past medical history of Asthma, Cervical Disc Displacement, Chronic Bronchitis, Depression, GERD (Gastroesophageal Reflux Disease), Hyperlipidemia, Hypothyroidism, Osteopenia and spinal stenosis, who has been having increasing shortness of breath and cough for last 3-4 days. Patient also c/o fevers and body aches. Patient was seen by Dr. Mcwilliams in office today and received IM Steroids and DuoNeb. Patient continued to have shortness of breath so she came in to ER. She is c/o cough, which is not productive of sputum.       Has Influenza bronchitis/ exac Asthma  Still wheezing alot, but improved.  May D/c pt on tapering dose prednisone, HHN.  To see me in office Tuesday.
62 years old female with past medical history of Asthma, Cervical Disc Displacement, Chronic Bronchitis, Depression, GERD (Gastroesophageal Reflux Disease), Hyperlipidemia, Hypothyroidism, Osteopenia and spinal stenosis, who has been having increasing shortness of breath and cough for last 3-4 days. Patient also c/o fevers and body aches. Patient was seen by Dr. Mcwilliams in office today and received IM Steroids and DuoNeb. Patient continued to have shortness of breath so she came in to ER. She is c/o cough, which is not productive of sputum.     Patient is being admitted for further care.
62 years old female with past medical history of Asthma, Cervical Disc Displacement, Chronic Bronchitis, Depression, GERD (Gastroesophageal Reflux Disease), Hyperlipidemia, Hypothyroidism, Osteopenia and spinal stenosis, who has been having increasing shortness of breath and cough for last 3-4 days. Patient also c/o fevers and body aches. Patient was seen by Dr. Mcwilliams in office today and received IM Steroids and DuoNeb. Patient continued to have shortness of breath so she came in to ER. She is c/o cough, which is not productive of sputum.     Patient is being admitted for further care.   Has Influenza bronchitis/ exac Asthma
62 years old female with past medical history of Asthma, Cervical Disc Displacement, Chronic Bronchitis, Depression, GERD (Gastroesophageal Reflux Disease), Hyperlipidemia, Hypothyroidism, Osteopenia and spinal stenosis, who has been having increasing shortness of breath and cough for last 3-4 days. Patient also c/o fevers and body aches. Patient was seen by Dr. Mcwilliams in office today and received IM Steroids and DuoNeb. Patient continued to have shortness of breath so she came in to ER. She is c/o cough, which is not productive of sputum.       Has Influenza bronchitis/ exac Asthma   this am
62 years old female with past medical history of Asthma, Cervical Disc Displacement, Chronic Bronchitis, Depression, GERD (Gastroesophageal Reflux Disease), Hyperlipidemia, Hypothyroidism, Osteopenia and spinal stenosis, who has been having increasing shortness of breath and cough for last 3-4 days. Patient also c/o fevers and body aches. Patient was seen by Dr. Mcwilliams in office today and received IM Steroids and DuoNeb. Patient continued to have shortness of breath so she came in to ER. She is c/o cough, which is not productive of sputum.     Patient is being admitted for further care.
62 years old female with past medical history of Asthma, Cervical Disc Displacement, Chronic Bronchitis, Depression, GERD (Gastroesophageal Reflux Disease), Hyperlipidemia, Hypothyroidism, Osteopenia and spinal stenosis, who has been having increasing shortness of breath and cough for last 3-4 days. Patient also c/o fevers and body aches. Patient was seen by Dr. Mcwilliams in office today and received IM Steroids and DuoNeb. Patient continued to have shortness of breath so she came in to ER. She is c/o cough, which is not productive of sputum.     Patient is being admitted for further care.   Has Influenza bronchitis/ exac Asthma

## 2018-02-10 NOTE — PROGRESS NOTE ADULT - NSHPATTENDINGPLANDISCUSS_GEN_ALL_CORE
nursing and RT  in detail.
patient
patient in detail.
daughter in detail.
nursing and RT  in detail.
patient
History of hysterectomy  one ovary was removed  2002  History of pubovaginal sling  2002, 2003

## 2018-06-08 ENCOUNTER — TRANSCRIPTION ENCOUNTER (OUTPATIENT)
Age: 63
End: 2018-06-08

## 2018-09-14 ENCOUNTER — MESSAGE (OUTPATIENT)
Age: 63
End: 2018-09-14

## 2018-10-02 ENCOUNTER — APPOINTMENT (OUTPATIENT)
Dept: NEUROSURGERY | Facility: CLINIC | Age: 63
End: 2018-10-02
Payer: MEDICARE

## 2018-10-02 ENCOUNTER — OUTPATIENT (OUTPATIENT)
Dept: OUTPATIENT SERVICES | Facility: HOSPITAL | Age: 63
LOS: 1 days | End: 2018-10-02
Payer: MEDICARE

## 2018-10-02 VITALS
SYSTOLIC BLOOD PRESSURE: 152 MMHG | BODY MASS INDEX: 33.13 KG/M2 | WEIGHT: 180 LBS | DIASTOLIC BLOOD PRESSURE: 81 MMHG | RESPIRATION RATE: 18 BRPM | HEIGHT: 62 IN | OXYGEN SATURATION: 95 % | HEART RATE: 70 BPM

## 2018-10-02 DIAGNOSIS — R29.898 OTHER SYMPTOMS AND SIGNS INVOLVING THE MUSCULOSKELETAL SYSTEM: ICD-10-CM

## 2018-10-02 DIAGNOSIS — R20.0 ANESTHESIA OF SKIN: ICD-10-CM

## 2018-10-02 DIAGNOSIS — M43.22 FUSION OF SPINE, CERVICAL REGION: Chronic | ICD-10-CM

## 2018-10-02 DIAGNOSIS — M48.02 SPINAL STENOSIS, CERVICAL REGION: ICD-10-CM

## 2018-10-02 PROCEDURE — 72050 X-RAY EXAM NECK SPINE 4/5VWS: CPT

## 2018-10-02 PROCEDURE — 99215 OFFICE O/P EST HI 40 MIN: CPT

## 2018-10-02 PROCEDURE — 72050 X-RAY EXAM NECK SPINE 4/5VWS: CPT | Mod: 26

## 2019-01-18 ENCOUNTER — INPATIENT (INPATIENT)
Facility: HOSPITAL | Age: 64
LOS: 14 days | Discharge: HOME HEALTH | DRG: 950 | End: 2019-02-02
Attending: SPECIALIST | Admitting: SPECIALIST
Payer: MEDICARE

## 2019-01-18 VITALS
WEIGHT: 198.42 LBS | HEART RATE: 89 BPM | DIASTOLIC BLOOD PRESSURE: 84 MMHG | HEIGHT: 62 IN | RESPIRATION RATE: 14 BRPM | SYSTOLIC BLOOD PRESSURE: 156 MMHG | OXYGEN SATURATION: 95 % | TEMPERATURE: 101 F

## 2019-01-18 DIAGNOSIS — M48.00 SPINAL STENOSIS, SITE UNSPECIFIED: ICD-10-CM

## 2019-01-18 DIAGNOSIS — M43.22 FUSION OF SPINE, CERVICAL REGION: Chronic | ICD-10-CM

## 2019-01-18 LAB
ANION GAP SERPL CALC-SCNC: 7 MMOL/L — SIGNIFICANT CHANGE UP (ref 5–17)
BASOPHILS # BLD AUTO: 0.1 K/UL — SIGNIFICANT CHANGE UP (ref 0–0.2)
BASOPHILS NFR BLD AUTO: 0.9 % — SIGNIFICANT CHANGE UP (ref 0–2)
BUN SERPL-MCNC: 7 MG/DL — SIGNIFICANT CHANGE UP (ref 7–23)
CALCIUM SERPL-MCNC: 8.5 MG/DL — SIGNIFICANT CHANGE UP (ref 8.4–10.5)
CHLORIDE SERPL-SCNC: 95 MMOL/L — LOW (ref 96–108)
CO2 SERPL-SCNC: 29 MMOL/L — SIGNIFICANT CHANGE UP (ref 22–31)
CREAT SERPL-MCNC: 0.67 MG/DL — SIGNIFICANT CHANGE UP (ref 0.5–1.3)
EOSINOPHIL # BLD AUTO: 0.3 K/UL — SIGNIFICANT CHANGE UP (ref 0–0.5)
EOSINOPHIL NFR BLD AUTO: 2.1 % — SIGNIFICANT CHANGE UP (ref 0–6)
GLUCOSE SERPL-MCNC: 101 MG/DL — HIGH (ref 70–99)
HCT VFR BLD CALC: 28.9 % — LOW (ref 34.5–45)
HGB BLD-MCNC: 9.3 G/DL — LOW (ref 11.5–15.5)
LACTATE SERPL-SCNC: 1.2 MMOL/L — SIGNIFICANT CHANGE UP (ref 0.7–2)
LYMPHOCYTES # BLD AUTO: 19.8 % — SIGNIFICANT CHANGE UP (ref 13–44)
LYMPHOCYTES # BLD AUTO: 2.8 K/UL — SIGNIFICANT CHANGE UP (ref 1–3.3)
MAGNESIUM SERPL-MCNC: 1.6 MG/DL — SIGNIFICANT CHANGE UP (ref 1.6–2.6)
MCHC RBC-ENTMCNC: 29.1 PG — SIGNIFICANT CHANGE UP (ref 27–34)
MCHC RBC-ENTMCNC: 32.3 GM/DL — SIGNIFICANT CHANGE UP (ref 32–36)
MCV RBC AUTO: 90.1 FL — SIGNIFICANT CHANGE UP (ref 80–100)
MONOCYTES # BLD AUTO: 1.2 K/UL — HIGH (ref 0–0.9)
MONOCYTES NFR BLD AUTO: 8.7 % — SIGNIFICANT CHANGE UP (ref 1–9)
NEUTROPHILS # BLD AUTO: 9.5 K/UL — HIGH (ref 1.8–7.4)
NEUTROPHILS NFR BLD AUTO: 68.4 % — SIGNIFICANT CHANGE UP (ref 43–77)
PLATELET # BLD AUTO: 418 K/UL — HIGH (ref 150–400)
POTASSIUM SERPL-MCNC: 4.2 MMOL/L — SIGNIFICANT CHANGE UP (ref 3.5–5.3)
POTASSIUM SERPL-SCNC: 4.2 MMOL/L — SIGNIFICANT CHANGE UP (ref 3.5–5.3)
RBC # BLD: 3.21 M/UL — LOW (ref 3.8–5.2)
RBC # FLD: 12.5 % — SIGNIFICANT CHANGE UP (ref 10.3–14.5)
SODIUM SERPL-SCNC: 131 MMOL/L — LOW (ref 135–145)
WBC # BLD: 13.9 K/UL — HIGH (ref 3.8–10.5)
WBC # FLD AUTO: 13.9 K/UL — HIGH (ref 3.8–10.5)

## 2019-01-18 RX ORDER — TIOTROPIUM BROMIDE 18 UG/1
1 CAPSULE ORAL; RESPIRATORY (INHALATION) ONCE
Qty: 0 | Refills: 0 | Status: COMPLETED | OUTPATIENT
Start: 2019-01-18 | End: 2019-01-18

## 2019-01-18 RX ORDER — DIAZEPAM 5 MG
1 TABLET ORAL
Qty: 0 | Refills: 0 | Status: DISCONTINUED | OUTPATIENT
Start: 2019-01-18 | End: 2019-01-18

## 2019-01-18 RX ORDER — BUDESONIDE AND FORMOTEROL FUMARATE DIHYDRATE 160; 4.5 UG/1; UG/1
2 AEROSOL RESPIRATORY (INHALATION)
Qty: 0 | Refills: 0 | Status: DISCONTINUED | OUTPATIENT
Start: 2019-01-18 | End: 2019-02-02

## 2019-01-18 RX ORDER — DOCUSATE SODIUM 100 MG
100 CAPSULE ORAL
Qty: 0 | Refills: 0 | Status: DISCONTINUED | OUTPATIENT
Start: 2019-01-18 | End: 2019-01-24

## 2019-01-18 RX ORDER — OXYCODONE HYDROCHLORIDE 5 MG/1
10 TABLET ORAL EVERY 4 HOURS
Qty: 0 | Refills: 0 | Status: DISCONTINUED | OUTPATIENT
Start: 2019-01-18 | End: 2019-01-25

## 2019-01-18 RX ORDER — METHOCARBAMOL 500 MG/1
500 TABLET, FILM COATED ORAL EVERY 8 HOURS
Qty: 0 | Refills: 0 | Status: DISCONTINUED | OUTPATIENT
Start: 2019-01-18 | End: 2019-01-30

## 2019-01-18 RX ORDER — ATORVASTATIN CALCIUM 80 MG/1
80 TABLET, FILM COATED ORAL AT BEDTIME
Qty: 0 | Refills: 0 | Status: DISCONTINUED | OUTPATIENT
Start: 2019-01-18 | End: 2019-02-02

## 2019-01-18 RX ORDER — PANTOPRAZOLE SODIUM 20 MG/1
40 TABLET, DELAYED RELEASE ORAL
Qty: 0 | Refills: 0 | Status: DISCONTINUED | OUTPATIENT
Start: 2019-01-18 | End: 2019-02-02

## 2019-01-18 RX ORDER — TIOTROPIUM BROMIDE 18 UG/1
1 CAPSULE ORAL; RESPIRATORY (INHALATION) DAILY
Qty: 0 | Refills: 0 | Status: DISCONTINUED | OUTPATIENT
Start: 2019-01-18 | End: 2019-02-02

## 2019-01-18 RX ORDER — LEVOTHYROXINE SODIUM 125 MCG
50 TABLET ORAL DAILY
Qty: 0 | Refills: 0 | Status: DISCONTINUED | OUTPATIENT
Start: 2019-01-18 | End: 2019-02-02

## 2019-01-18 RX ORDER — OXYCODONE HYDROCHLORIDE 5 MG/1
5 TABLET ORAL EVERY 4 HOURS
Qty: 0 | Refills: 0 | Status: DISCONTINUED | OUTPATIENT
Start: 2019-01-18 | End: 2019-01-25

## 2019-01-18 RX ORDER — DIAZEPAM 5 MG
5 TABLET ORAL DAILY
Qty: 0 | Refills: 0 | Status: DISCONTINUED | OUTPATIENT
Start: 2019-01-18 | End: 2019-01-25

## 2019-01-18 RX ORDER — SENNA PLUS 8.6 MG/1
2 TABLET ORAL AT BEDTIME
Qty: 0 | Refills: 0 | Status: DISCONTINUED | OUTPATIENT
Start: 2019-01-18 | End: 2019-02-02

## 2019-01-18 RX ORDER — ACETAMINOPHEN 500 MG
650 TABLET ORAL EVERY 6 HOURS
Qty: 0 | Refills: 0 | Status: DISCONTINUED | OUTPATIENT
Start: 2019-01-18 | End: 2019-02-02

## 2019-01-18 RX ORDER — LIDOCAINE 4 G/100G
1 CREAM TOPICAL DAILY
Qty: 0 | Refills: 0 | Status: DISCONTINUED | OUTPATIENT
Start: 2019-01-18 | End: 2019-02-02

## 2019-01-18 RX ORDER — POLYETHYLENE GLYCOL 3350 17 G/17G
17 POWDER, FOR SOLUTION ORAL DAILY
Qty: 0 | Refills: 0 | Status: DISCONTINUED | OUTPATIENT
Start: 2019-01-18 | End: 2019-02-02

## 2019-01-18 RX ORDER — MONTELUKAST 4 MG/1
10 TABLET, CHEWABLE ORAL DAILY
Qty: 0 | Refills: 0 | Status: DISCONTINUED | OUTPATIENT
Start: 2019-01-18 | End: 2019-02-02

## 2019-01-18 RX ORDER — ENOXAPARIN SODIUM 100 MG/ML
40 INJECTION SUBCUTANEOUS EVERY 24 HOURS
Qty: 0 | Refills: 0 | Status: DISCONTINUED | OUTPATIENT
Start: 2019-01-18 | End: 2019-02-02

## 2019-01-18 RX ADMIN — PANTOPRAZOLE SODIUM 40 MILLIGRAM(S): 20 TABLET, DELAYED RELEASE ORAL at 21:34

## 2019-01-18 RX ADMIN — Medication 650 MILLIGRAM(S): at 22:11

## 2019-01-18 RX ADMIN — Medication 100 MILLIGRAM(S): at 21:28

## 2019-01-18 RX ADMIN — OXYCODONE HYDROCHLORIDE 10 MILLIGRAM(S): 5 TABLET ORAL at 16:34

## 2019-01-18 RX ADMIN — SENNA PLUS 2 TABLET(S): 8.6 TABLET ORAL at 21:28

## 2019-01-18 RX ADMIN — OXYCODONE HYDROCHLORIDE 10 MILLIGRAM(S): 5 TABLET ORAL at 21:28

## 2019-01-18 RX ADMIN — OXYCODONE HYDROCHLORIDE 10 MILLIGRAM(S): 5 TABLET ORAL at 17:59

## 2019-01-18 RX ADMIN — TIOTROPIUM BROMIDE 1 CAPSULE(S): 18 CAPSULE ORAL; RESPIRATORY (INHALATION) at 21:46

## 2019-01-18 RX ADMIN — OXYCODONE HYDROCHLORIDE 10 MILLIGRAM(S): 5 TABLET ORAL at 22:00

## 2019-01-18 RX ADMIN — Medication 650 MILLIGRAM(S): at 21:28

## 2019-01-18 RX ADMIN — Medication 1 MILLIGRAM(S): at 17:32

## 2019-01-18 RX ADMIN — BUDESONIDE AND FORMOTEROL FUMARATE DIHYDRATE 2 PUFF(S): 160; 4.5 AEROSOL RESPIRATORY (INHALATION) at 21:47

## 2019-01-18 RX ADMIN — ATORVASTATIN CALCIUM 80 MILLIGRAM(S): 80 TABLET, FILM COATED ORAL at 21:27

## 2019-01-18 NOTE — H&P ADULT - HISTORY OF PRESENT ILLNESS
63F with PMH of HLD, hypothyroidism, GERD, depression/anxiety, COPD, s/p lumbar fusion in 2006 and multilevel ACDF in 2010 presenting with UE weakness and numbness as well neck and arm pain found to have severe multilevel cervical stenosis admitted to [facility] on 1/11/19 s/p C2-T2 posterior decompression and fusion on 1/11/19 being admitted here for rehab. Pt developed UE weakness and numbness as well as neck and arm pain. She was found to have severe multilevel cervical stenosis despite previous ACDF. She presented to [facility] on 1/11/19 for elective C2-T2 posterior decompression and fusion. She was managed post op for pain. Her hospital course was complicated by acute onset right thumb numbness. CT cervical/thoracic spine was unremarkable. On POD5, she presented with fever. She was pan cultured and started empirically on antibiotics for possible UTI. Infections workup (UA, urine culture, blood culture, CXR) were negative. Antibiotics were discontinued and patient remained stable off of antibiotics.     Patient was medically stabilized and admitted here for rehab. 63F with PMH of HLD, hypothyroidism, GERD, depression/anxiety, COPD, s/p lumbar fusion in 2006 and multilevel ACDF in 2010 presenting with UE weakness and numbness as well neck and arm pain found to have severe multilevel cervical stenosis admitted to [facility] on 1/11/19 s/p C2-T2 posterior decompression and fusion on 1/11/19 being admitted here for rehab. Pt developed UE weakness and numbness as well as neck and arm pain. She was found to have severe multilevel cervical stenosis despite previous ACDF. She presented to [facility] on 1/11/19 for elective C2-T2 posterior decompression with laminectomy, facetectomy, foraminectomy from C2 to T1. She was managed post op for pain. Her hospital course was complicated by acute onset right thumb numbness. CT cervical/thoracic spine was unremarkable. On POD5, she presented with fever. She was pan cultured and started empirically on antibiotics (zpsyn) for possible UTI for 24 hours.  Infections workup (UA, urine culture, blood culture, CXR) were negative. Antibiotics were discontinued and patient remained stable off of antibiotics.   Patient was medically stabilized and admitted here for rehab. 63F with PMH of HLD, hypothyroidism, GERD, depression/anxiety, asthma, s/p lumbar fusion in 2006 and multilevel ACDF in 2010 presenting with UE weakness and numbness as well neck and arm pain found to have severe multilevel cervical stenosis admitted to [facility] on 1/11/19 s/p C2-T2 posterior decompression and fusion on 1/11/19 being admitted here for rehab. Pt developed UE weakness and numbness as well as neck and arm pain. She was found to have severe multilevel cervical stenosis despite previous ACDF. She presented to [facility] on 1/11/19 for elective C2-T2 posterior decompression with laminectomy, facetectomy, foraminectomy from C2 to T1. She was managed post op for pain. Her hospital course was complicated by acute onset right thumb numbness. CT cervical/thoracic spine was unremarkable. On POD5, she presented with fever. She was pan cultured and started empirically on antibiotics (zpsyn) for possible UTI for 24 hours.  Infections workup (UA, urine culture, blood culture, CXR) were negative. Antibiotics were discontinued and patient remained stable off of antibiotics.   Patient was medically stabilized and admitted here for rehab.

## 2019-01-18 NOTE — H&P ADULT - ASSESSMENT
ASSESSMENT/PLAN  63F with PMH of HLD, hypothyroidism, GERD, depression/anxiety, COPD, s/p lumbar fusion in 2006 and multilevel ACDF in 2010 presenting with UE weakness and numbness as well neck and arm pain found to have severe multilevel cervical stenosis admitted to [facility] on 1/11/19 s/p C2-T2 posterior decompression and fusion on 1/11/19 being admitted here for rehab with Gait Instability, ADL impairments and Functional impairments.    COMORBIDITES/ACTIVE MEDICAL ISSUES     Gait Instability, ADL impairments and Functional impairments:   -Start Comprehensive Rehab Program of PT/OT     Cervical stenosis: s/p C2-T2 posterior decompression and fusion on 1/11/19 at [facility]  -Continue with comprehensive rehab program  -Follow up with surgeon as outpatient  -Pain control: _____    COPD:  -Continue with Symbicort, Spiriva  -Continue with montelukast    Hypothyroidism;  -Continue with synthroid    Reflux:  -Continue with protonix    Anemia: Hb 8.9. Vitals stable and no signs of bleeding on exam  -Check CBC in am    Leukocytosis: WBC 13.75 <-17.20. Likely reactive as there are no signs of infection on exam and pt denies new sx  -Check CBC in am    Pain Mgmt - Tylenol PRN,   GI/Bowel Mgmt -  Continue with Colace, Senna. Monitor output  /Bladder Mgmt - Monitor output    FEN   - Diet - Regular + Thins  [ DASH/TLC]        Precautions / PROPHYLAXIS:   - Falls,  Spinal,   - ortho: Weight bearing status: WBAT   - Lungs: Aspiration, Incentive Spirometer   - Pressure injury/Skin: Turn Q2hrs while in bed, OOB to Chair, PT/OT    - DVT: Lovenox, SCDs, TEDs     MEDICAL PROGNOSIS: GOOD            REHAB POTENTIAL: GOOD             ESTIMATED DISPOSITION: HOME WITH HOME CARE            ELOS: 10-14 Days   EXPECTED THERAPY:     P.T. 1.5hr/day       O.T. 1.5hr/day      S.L.P. 0hr/day     P&O Unnecessary     EXP FREQUENCY: 5 days per 7 day period     PRESCREEN COMPARISION:   I have reviewed the prescreen information and I have found no relevant changes between the preadmission screening and my post admission evaluation     RATIONALE FOR INPATIENT ADMISSION - Patient demonstrates the following: (check all that apply)  [X] Medically appropriate for rehabilitation admission  [X] Has attainable rehab goals with an appropriate initial discharge plan  [X] Has rehabilitation potential (expected to make a significant improvement within a reasonable period of time)   [X] Requires close medical management by a rehab physician, rehab nursing care, Hospitalist and comprehensive interdisciplinary team (including PT, OT, & or SLP, Prosthetics and Orthotics) ASSESSMENT/PLAN  63F with PMH of HLD, hypothyroidism, GERD, depression/anxiety, COPD, s/p lumbar fusion in 2006 and multilevel ACDF in 2010 presenting with UE weakness and numbness as well neck and arm pain found to have severe multilevel cervical stenosis admitted to [facility] on 1/11/19 s/p C2-T2 posterior decompression and fusion on 1/11/19 being admitted here for rehab with Gait Instability, ADL impairments and Functional impairments.    COMORBIDITES/ACTIVE MEDICAL ISSUES     Gait Instability, ADL impairments and Functional impairments:   -Start Comprehensive Rehab Program of PT/OT     Cervical stenosis: s/p C2-T2 posterior decompression and fusion on 1/11/19 at [facility]  -Continue with comprehensive rehab program  -Follow up with surgeon as outpatient  -Pain control: tylenol, Oxycodone 5 (mod)/ 10 Severe q 4, methocarbomol prn muscle spasm  -maintain cervical collar    Febrile:   -with leukocytosis prior trending down (suspected to be reactive from operation, down trending)) WBC (1/17) 17.2 -> (1/18) 13.75, recent UA/Ucx done from OSH negative  -CXR with LLL opacity likely atelectasis      Anemia: Hb (1/17) 9.9-> (1/18) 8.9  -Vitals stable and no signs of bleeding on exam  -Check CBC in am    COPD:  -Continue with Symbicort, Spiriva  -Continue with montelukast    Hypothyroidism;  -Continue with synthroid    Reflux:  -Continue with protonix    DC planning  -Schedule appt with Dr. Patrick Turner ( [phone])    Pain Mgmt - Tylenol PRN,   GI/Bowel Mgmt -  Continue with Colace, Senna. Monitor output  /Bladder Mgmt - Monitor output    FEN   - Diet - Regular + Thins  [ DASH/TLC]      Precautions / PROPHYLAXIS:   - Falls,  Spinal,   - ortho: Weight bearing status: WBAT   - Lungs: Aspiration, Incentive Spirometer   - Pressure injury/Skin: Turn Q2hrs while in bed, OOB to Chair, PT/OT    - DVT: Lovenox, SCDs, TEDs     MEDICAL PROGNOSIS: GOOD            REHAB POTENTIAL: GOOD             ESTIMATED DISPOSITION: HOME WITH HOME CARE            ELOS: 10-14 Days   EXPECTED THERAPY:     P.T. 1.5hr/day       O.T. 1.5hr/day      S.L.P. 0hr/day     P&O Unnecessary     EXP FREQUENCY: 5 days per 7 day period     PRESCREEN COMPARISION:   I have reviewed the prescreen information and I have found no relevant changes between the preadmission screening and my post admission evaluation     RATIONALE FOR INPATIENT ADMISSION - Patient demonstrates the following: (check all that apply)  [X] Medically appropriate for rehabilitation admission  [X] Has attainable rehab goals with an appropriate initial discharge plan  [X] Has rehabilitation potential (expected to make a significant improvement within a reasonable period of time)   [X] Requires close medical management by a rehab physician, rehab nursing care, Hospitalist and comprehensive interdisciplinary team (including PT, OT, & or SLP, Prosthetics and Orthotics) ASSESSMENT/PLAN  63F with PMH of HLD, hypothyroidism, GERD, depression/anxiety, asthma, s/p lumbar fusion in 2006 and multilevel ACDF in 2010 presenting with UE weakness and numbness as well neck and arm pain found to have severe multilevel cervical stenosis admitted to [facility] on 1/11/19 s/p C2-T2 posterior decompression and fusion on 1/11/19 being admitted here for rehab with Gait Instability, ADL impairments and Functional impairments.    COMORBIDITES/ACTIVE MEDICAL ISSUES     Gait Instability, ADL impairments and Functional impairments:   -Start Comprehensive Rehab Program of PT/OT     Cervical stenosis: s/p C2-T2 posterior decompression and fusion on 1/11/19 at [facility]  -Continue with comprehensive rehab program  -Follow up with surgeon as outpatient  -Pain control: tylenol prn, oxycodone prn, pregabalin, methocarbomol prn muscle spasm  -Maintain cervical collar    Febrile: T 101, re-checked 100.5. Pt reports headache associated from the neck pain and chills. Reports chronic weakness and numbness and tingling. Denies SOB, cough, abdominal or urinary sx. With leukocytosis prior trending down (suspected to be reactive from operation) WBC (1/17) 17.2 -> (1/18) 13.75. Recent UA/Ucx done from OSH negative. CXR with LLL opacity likely atelectasis done at OSH  -Check stat CBC with diff, BMP, lactate  -Check UA  -Discussed with pt about repeat CXR, but pt declined as she had a CXR done at OSH  -Monitor vitals  -Continue with incentive spirometry    Anemia: Hb (1/17) 9.9-> (1/18) 8.9  -Vitals stable and no signs of bleeding on exam  -Check CBC today    HLD:  -Continue with statin     Asthma:  -Continue with Symbicort, Spiriva  -Continue with montelukast    Hypothyroidism;  -Continue with synthroid    Reflux:  -Continue with protonix    Anxiety:  -Continue with diazepam    DC planning  -Schedule appt with Dr. Patrick Turner ( [phone])    Pain Mgmt - Tylenol PRN,   GI/Bowel Mgmt -  Continue with Colace, Senna. Monitor output  /Bladder Mgmt - Monitor output    FEN   - Diet - Regular + Thins  [ DASH/TLC]      Precautions / PROPHYLAXIS:   - Falls,  Spinal,   - ortho: Weight bearing status: WBAT   - Lungs: Aspiration, Incentive Spirometer   - Pressure injury/Skin: Turn Q2hrs while in bed, OOB to Chair, PT/OT    - DVT: Lovenox, SCDs, TEDs     MEDICAL PROGNOSIS: GOOD            REHAB POTENTIAL: GOOD             ESTIMATED DISPOSITION: HOME WITH HOME CARE            ELOS: 10-14 Days   EXPECTED THERAPY:     P.T. 1.5hr/day       O.T. 1.5hr/day      S.L.P. 0hr/day     P&O Unnecessary     EXP FREQUENCY: 5 days per 7 day period     PRESCREEN COMPARISION:   I have reviewed the prescreen information and I have found no relevant changes between the preadmission screening and my post admission evaluation     RATIONALE FOR INPATIENT ADMISSION - Patient demonstrates the following: (check all that apply)  [X] Medically appropriate for rehabilitation admission  [X] Has attainable rehab goals with an appropriate initial discharge plan  [X] Has rehabilitation potential (expected to make a significant improvement within a reasonable period of time)   [X] Requires close medical management by a rehab physician, rehab nursing care, Hospitalist and comprehensive interdisciplinary team (including PT, OT, & or SLP, Prosthetics and Orthotics) ASSESSMENT/PLAN  63F with PMH of HLD, hypothyroidism, GERD, depression/anxiety, asthma, s/p lumbar fusion in 2006 and multilevel ACDF in 2010 presenting with UE weakness and numbness as well neck and arm pain found to have severe multilevel cervical stenosis admitted to [facility] on 1/11/19 s/p C2-T2 posterior decompression and fusion on 1/11/19 being admitted here for rehab with Gait Instability, ADL impairments and Functional impairments.    COMORBIDITES/ACTIVE MEDICAL ISSUES     Gait Instability, ADL impairments and Functional impairments:   -Start Comprehensive Rehab Program of PT/OT     Cervical stenosis: s/p C2-T2 posterior decompression and fusion on 1/11/19 at [facility]  -Continue with comprehensive rehab program  -Follow up with surgeon as outpatient  -Pain control: tylenol prn, oxycodone prn, pregabalin, methocarbomol prn muscle spasm  -Maintain cervical collar    Febrile: T 101, re-checked 100.5. Pt reports headache associated from the neck pain and chills. Reports chronic weakness and numbness and tingling. Denies SOB, cough, abdominal or urinary sx. With leukocytosis prior trending down (suspected to be reactive from operation) WBC (1/17) 17.2 -> (1/18) 13.75. Recent UA/Ucx done from OSH negative. CXR with LLL opacity likely atelectasis done at OSH  -Check stat CBC with diff, BMP, lactate, blood cultures  -Check UA  -Discussed with pt about repeat CXR, but pt declined as she had a CXR done at OSH  -Monitor vitals  -Continue with incentive spirometry    Anemia: Hb (1/17) 9.9-> (1/18) 8.9  -Vitals stable and no signs of bleeding on exam  -Check CBC today    HLD:  -Continue with statin     Asthma:  -Continue with Symbicort, Spiriva  -Continue with montelukast    Hypothyroidism;  -Continue with synthroid    Reflux:  -Continue with protonix    Anxiety:  -Continue with diazepam    DC planning  -Schedule appt with Dr. Patrick Turner ( [phone])    Pain Mgmt - Tylenol PRN, oxycodone prn, pregabalin, methocarbomol prn muscle spasm  GI/Bowel Mgmt -  Continue with Colace, Senna. Monitor output  /Bladder Mgmt - Monitor output    FEN   - Diet - Regular + Thins  [ DASH/TLC]      Precautions / PROPHYLAXIS:   - Falls,  Spinal,   - ortho: Weight bearing status: WBAT   - Lungs: Aspiration, Incentive Spirometer   - Pressure injury/Skin: Turn Q2hrs while in bed, OOB to Chair, PT/OT    - DVT: Lovenox, SCDs, TEDs     MEDICAL PROGNOSIS: GOOD            REHAB POTENTIAL: GOOD             ESTIMATED DISPOSITION: HOME WITH HOME CARE            ELOS: 10-14 Days   EXPECTED THERAPY:     P.T. 1.5hr/day       O.T. 1.5hr/day      S.L.P. 0hr/day     P&O Unnecessary     EXP FREQUENCY: 5 days per 7 day period     PRESCREEN COMPARISION:   I have reviewed the prescreen information and I have found no relevant changes between the preadmission screening and my post admission evaluation     RATIONALE FOR INPATIENT ADMISSION - Patient demonstrates the following: (check all that apply)  [X] Medically appropriate for rehabilitation admission  [X] Has attainable rehab goals with an appropriate initial discharge plan  [X] Has rehabilitation potential (expected to make a significant improvement within a reasonable period of time)   [X] Requires close medical management by a rehab physician, rehab nursing care, Hospitalist and comprehensive interdisciplinary team (including PT, OT, & or SLP, Prosthetics and Orthotics)      Dr. Khan made aware ASSESSMENT/PLAN  63F with PMH of HLD, hypothyroidism, GERD, depression/anxiety, asthma, s/p lumbar fusion in 2006 and multilevel ACDF in 2010 presenting with UE weakness and numbness as well neck and arm pain found to have severe multilevel cervical stenosis admitted to [facility] on 1/11/19 s/p C2-T2 posterior decompression and fusion on 1/11/19 being admitted here for rehab with Gait Instability, ADL impairments and Functional impairments.    COMORBIDITES/ACTIVE MEDICAL ISSUES     Gait Instability, ADL impairments and Functional impairments:   -Start Comprehensive Rehab Program of PT/OT     Cervical stenosis: s/p C2-T2 posterior decompression and fusion on 1/11/19 at [facility]  -Continue with comprehensive rehab program  -Follow up with surgeon as outpatient  -Pain control: tylenol prn, oxycodone prn, pregabalin, methocarbomol prn muscle spasm, lidoderm patch  -Maintain cervical collar    Febrile: T 101, re-checked 100.5. Pt reports headache associated from the neck pain and chills. Reports chronic weakness and numbness and tingling. Denies SOB, cough, abdominal or urinary sx. With leukocytosis prior trending down (suspected to be reactive from operation) WBC (1/17) 17.2 -> (1/18) 13.75. Recent UA/Ucx done from OSH negative. CXR with LLL opacity likely atelectasis done at OSH  -Check stat CBC with diff, BMP, lactate, blood cultures  -Check UA  -Discussed with pt about repeat CXR, but pt declined as she had a CXR done at OSH  -Monitor vitals  -Continue with incentive spirometry    Anemia: Hb (1/17) 9.9-> (1/18) 8.9  -Vitals stable and no signs of bleeding on exam  -Check CBC today    HLD:  -Continue with statin     Asthma:  -Continue with Symbicort, Spiriva  -Continue with montelukast    Hypothyroidism;  -Continue with synthroid    Reflux:  -Continue with protonix    Anxiety:  -Continue with diazepam  -Will clarify dose with PCP (Dr. Wil Ross)    DC planning  -Schedule appt with Dr. Patrick Turner ( [phone])    Pain Mgmt - Tylenol PRN, oxycodone prn, pregabalin, methocarbomol prn muscle spasm  GI/Bowel Mgmt -  Continue with Colace, Senna. Monitor output  /Bladder Mgmt - Monitor output    FEN   - Diet - Regular + Thins  [ DASH/TLC]      Precautions / PROPHYLAXIS:   - Falls,  Spinal,   - ortho: Weight bearing status: WBAT   - Lungs: Aspiration, Incentive Spirometer   - Pressure injury/Skin: Turn Q2hrs while in bed, OOB to Chair, PT/OT    - DVT: Lovenox, SCDs, TEDs     MEDICAL PROGNOSIS: GOOD            REHAB POTENTIAL: GOOD             ESTIMATED DISPOSITION: HOME WITH HOME CARE            ELOS: 10-14 Days   EXPECTED THERAPY:     P.T. 1.5hr/day       O.T. 1.5hr/day      S.L.P. 0hr/day     P&O Unnecessary     EXP FREQUENCY: 5 days per 7 day period     PRESCREEN COMPARISION:   I have reviewed the prescreen information and I have found no relevant changes between the preadmission screening and my post admission evaluation     RATIONALE FOR INPATIENT ADMISSION - Patient demonstrates the following: (check all that apply)  [X] Medically appropriate for rehabilitation admission  [X] Has attainable rehab goals with an appropriate initial discharge plan  [X] Has rehabilitation potential (expected to make a significant improvement within a reasonable period of time)   [X] Requires close medical management by a rehab physician, rehab nursing care, Hospitalist and comprehensive interdisciplinary team (including PT, OT, & or SLP, Prosthetics and Orthotics)      Case discussed with Dr. Butcher ASSESSMENT/PLAN  63F with PMH of HLD, hypothyroidism, GERD, depression/anxiety, asthma, s/p lumbar fusion in 2006 and multilevel ACDF in 2010 presenting with UE weakness and numbness as well neck and arm pain found to have severe multilevel cervical stenosis admitted to [facility] on 1/11/19 s/p C2-T2 posterior decompression and fusion on 1/11/19 being admitted here for rehab with Gait Instability, ADL impairments and Functional impairments.    COMORBIDITES/ACTIVE MEDICAL ISSUES     Gait Instability, ADL impairments and Functional impairments:   -Start Comprehensive Rehab Program of PT/OT     Cervical stenosis: s/p C2-T2 posterior decompression and fusion on 1/11/19 at [facility]  -Continue with comprehensive rehab program  -Follow up with surgeon as outpatient  -Pain control: tylenol prn, oxycodone prn, pregabalin, methocarbomol prn muscle spasm, lidoderm patch  -Maintain cervical collar    Febrile: T 101, re-checked 100.5. Pt reports headache associated from the neck pain and chills. Reports chronic weakness and numbness and tingling. Denies SOB, cough, abdominal or urinary sx. With leukocytosis prior trending down (suspected to be reactive from operation) WBC (1/17) 17.2 -> (1/18) 13.75. Recent UA/Ucx done from OSH negative. CXR with LLL opacity likely atelectasis done at OSH  -Check stat CBC with diff, BMP, lactate, blood cultures  -Check UA  -Discussed with pt about repeat CXR, but pt declined as she had a CXR done at OSH  -Monitor vitals  -Continue with incentive spirometry    Anemia: Hb (1/17) 9.9-> (1/18) 8.9  -Check CBC today    HLD:  -Continue with statin     Asthma:  -Continue with Symbicort, Spiriva  -Continue with montelukast    Hypothyroidism;  -Continue with synthroid    Reflux:  -Continue with protonix    Anxiety: Per pt, on diazepam 5mg prn up to 4 times a days. Clarified with OSH and reported that pt received diazepam 5mg prn up to 4 times a day without negative side effects  -Continue with diazepam 5mg prn  -Will clarify dose with PCP (Dr. Wil Ross)    DC planning  -Schedule appt with Dr. Patrick Turner ( [phone])    Pain Mgmt - Tylenol PRN, oxycodone prn, pregabalin, methocarbomol prn muscle spasm  GI/Bowel Mgmt -  Continue with Colace, Senna. Monitor output  /Bladder Mgmt - Monitor output    FEN   - Diet - Regular + Thins  [ DASH/TLC]      Precautions / PROPHYLAXIS:   - Falls,  Spinal,   - ortho: Weight bearing status: WBAT   - Lungs: Aspiration, Incentive Spirometer   - Pressure injury/Skin: Turn Q2hrs while in bed, OOB to Chair, PT/OT    - DVT: Lovenox, SCDs, TEDs     MEDICAL PROGNOSIS: GOOD            REHAB POTENTIAL: GOOD             ESTIMATED DISPOSITION: HOME WITH HOME CARE            ELOS: 10-14 Days   EXPECTED THERAPY:     P.T. 1.5hr/day       O.T. 1.5hr/day      S.L.P. 0hr/day     P&O Unnecessary     EXP FREQUENCY: 5 days per 7 day period     PRESCREEN COMPARISION:   I have reviewed the prescreen information and I have found no relevant changes between the preadmission screening and my post admission evaluation     RATIONALE FOR INPATIENT ADMISSION - Patient demonstrates the following: (check all that apply)  [X] Medically appropriate for rehabilitation admission  [X] Has attainable rehab goals with an appropriate initial discharge plan  [X] Has rehabilitation potential (expected to make a significant improvement within a reasonable period of time)   [X] Requires close medical management by a rehab physician, rehab nursing care, Hospitalist and comprehensive interdisciplinary team (including PT, OT, & or SLP, Prosthetics and Orthotics)      Case discussed with Dr. Butcher

## 2019-01-18 NOTE — H&P ADULT - NSHPSOCIALHISTORY_GEN_ALL_CORE
Patient is . She lives with spouse and assists with his care. Lives in private home with 8 steps to enter. She has a walker and shower chair in her home. Prior to her surgery, she was independent in ADLs and functional mobility.     Current Functional Status:  Transfers: CG with RW  Gait: Ambulated 75ft with CG and RW  ADL: Set up for LB dressing Patient is . She lives with spouse and assists with his care. Lives in private home with 18 within home and 2 to enter. She has a walker and shower chair in her home. Prior to her surgery, she was independent in ADLs and functional mobility. Retired    Current Functional Status:  Transfers: CG with RW  Gait: Ambulated 75ft with CG and RW  ADL: Set up for LB dressing

## 2019-01-18 NOTE — H&P ADULT - NSHPREVIEWOFSYSTEMS_GEN_ALL_CORE
REVIEW OF SYSTEMS  Constitutional: No fever, No Chills, No fatigue  HEENT: No eye pain, No visual disturbances, No difficulty hearing  Pulm: No cough,  No shortness of breath  Cardio: No chest pain, No palpitations  GI:  No abdominal pain, No nausea, No vomiting, No diarrhea, No constipation  : No dysuria, No frequency, No hematuria  Neuro: No headaches, No memory loss, No loss of strength, No numbness, No tremors  Skin: No itching, No rashes, No lesions   Endo: No temperature intolerance  MSK: No joint pain, No joint swelling, No muscle pain, No Neck or back pain  Psych:  No depression, No anxiety REVIEW OF SYSTEMS  Constitutional: +chills, +fevers (reports chronic temps around 100 at OSH)  HEENT: No eye pain, No visual disturbances, No difficulty hearing, +neck pain  Pulm: No cough,  No shortness of breath  Cardio: No chest pain, No palpitations  GI:  No abdominal pain, No nausea, No vomiting, No diarrhea, No constipation. Las BM today  : No dysuria, No frequency, No hematuria  Neuro: +HA associated with neck pain all over the head, 9/10, +weakness in BL arms, +numbness tingling in BL arms and legs  Skin: +surgical incision  Endo: +chills  MSK: +neck and lower back pain

## 2019-01-18 NOTE — H&P ADULT - NSHPLABSRESULTS_GEN_ALL_CORE
Labs from 1/15:  Na 140  Cl 98  K 4.1  Cr 0.70    Labs from 1/18  WBC 13.75 <- 17.20 <-18.16  Hgb 8.9  Hct 26.6  Plt 384      CT Cervical Spine: Patient is status post interval bilateral laminextomies from C3 through C7. There has been interval fusion from C2 through T2 utilizing two posterior rods bilateral screws at C2, C3, C4, T1, T2. Prior surgical changes including anterior cervical discectomies and fusions at C3-4 ,C4-5 and C5-6 is again noted.     CT Cervical and Thoracic Spine 1/12/19: Status post interval bilateral C3-C7 laminectomies and interval posterior instrumented fusion from C2 through T2. Status post prior anterior cervical discectomies and fusions from C3-4 through C5-6. No significant interval changes in severe multilevel discogenic degenerative changes of the cervical spine. No evidence of increased spinal canal stenosis or neural foraminal narrowing as compared to the prior examination, however, evaluation is significantly limited due to beam hardening artifact from surgical hardware.

## 2019-01-19 LAB
ALBUMIN SERPL ELPH-MCNC: 2 G/DL — LOW (ref 3.3–5)
ALP SERPL-CCNC: 85 U/L — SIGNIFICANT CHANGE UP (ref 40–120)
ALT FLD-CCNC: 14 U/L DA — SIGNIFICANT CHANGE UP (ref 10–45)
ANION GAP SERPL CALC-SCNC: 8 MMOL/L — SIGNIFICANT CHANGE UP (ref 5–17)
APPEARANCE UR: CLEAR — SIGNIFICANT CHANGE UP
AST SERPL-CCNC: 24 U/L — SIGNIFICANT CHANGE UP (ref 10–40)
BASOPHILS # BLD AUTO: 0.1 K/UL — SIGNIFICANT CHANGE UP (ref 0–0.2)
BASOPHILS NFR BLD AUTO: 0.9 % — SIGNIFICANT CHANGE UP (ref 0–2)
BILIRUB SERPL-MCNC: 0.3 MG/DL — SIGNIFICANT CHANGE UP (ref 0.2–1.2)
BILIRUB UR-MCNC: NEGATIVE — SIGNIFICANT CHANGE UP
BUN SERPL-MCNC: 6 MG/DL — LOW (ref 7–23)
CALCIUM SERPL-MCNC: 8.9 MG/DL — SIGNIFICANT CHANGE UP (ref 8.4–10.5)
CHLORIDE SERPL-SCNC: 99 MMOL/L — SIGNIFICANT CHANGE UP (ref 96–108)
CO2 SERPL-SCNC: 29 MMOL/L — SIGNIFICANT CHANGE UP (ref 22–31)
COLOR SPEC: YELLOW — SIGNIFICANT CHANGE UP
CREAT SERPL-MCNC: 0.64 MG/DL — SIGNIFICANT CHANGE UP (ref 0.5–1.3)
DIFF PNL FLD: NEGATIVE — SIGNIFICANT CHANGE UP
EOSINOPHIL # BLD AUTO: 0.4 K/UL — SIGNIFICANT CHANGE UP (ref 0–0.5)
EOSINOPHIL NFR BLD AUTO: 2.8 % — SIGNIFICANT CHANGE UP (ref 0–6)
GLUCOSE SERPL-MCNC: 103 MG/DL — HIGH (ref 70–99)
GLUCOSE UR QL: NEGATIVE — SIGNIFICANT CHANGE UP
HCT VFR BLD CALC: 28.1 % — LOW (ref 34.5–45)
HGB BLD-MCNC: 9.2 G/DL — LOW (ref 11.5–15.5)
KETONES UR-MCNC: NEGATIVE — SIGNIFICANT CHANGE UP
LEUKOCYTE ESTERASE UR-ACNC: NEGATIVE — SIGNIFICANT CHANGE UP
LYMPHOCYTES # BLD AUTO: 2.7 K/UL — SIGNIFICANT CHANGE UP (ref 1–3.3)
LYMPHOCYTES # BLD AUTO: 21.3 % — SIGNIFICANT CHANGE UP (ref 13–44)
MCHC RBC-ENTMCNC: 29.4 PG — SIGNIFICANT CHANGE UP (ref 27–34)
MCHC RBC-ENTMCNC: 32.6 GM/DL — SIGNIFICANT CHANGE UP (ref 32–36)
MCV RBC AUTO: 90 FL — SIGNIFICANT CHANGE UP (ref 80–100)
MONOCYTES # BLD AUTO: 1.4 K/UL — HIGH (ref 0–0.9)
MONOCYTES NFR BLD AUTO: 10.7 % — HIGH (ref 1–9)
NEUTROPHILS # BLD AUTO: 8.2 K/UL — HIGH (ref 1.8–7.4)
NEUTROPHILS NFR BLD AUTO: 64.3 % — SIGNIFICANT CHANGE UP (ref 43–77)
NITRITE UR-MCNC: NEGATIVE — SIGNIFICANT CHANGE UP
PH UR: 6.5 — SIGNIFICANT CHANGE UP (ref 5–8)
PLATELET # BLD AUTO: 432 K/UL — HIGH (ref 150–400)
POTASSIUM SERPL-MCNC: 3.5 MMOL/L — SIGNIFICANT CHANGE UP (ref 3.5–5.3)
POTASSIUM SERPL-SCNC: 3.5 MMOL/L — SIGNIFICANT CHANGE UP (ref 3.5–5.3)
PROT SERPL-MCNC: 6.1 G/DL — SIGNIFICANT CHANGE UP (ref 6–8.3)
PROT UR-MCNC: NEGATIVE — SIGNIFICANT CHANGE UP
RBC # BLD: 3.12 M/UL — LOW (ref 3.8–5.2)
RBC # FLD: 12.4 % — SIGNIFICANT CHANGE UP (ref 10.3–14.5)
SODIUM SERPL-SCNC: 136 MMOL/L — SIGNIFICANT CHANGE UP (ref 135–145)
SP GR SPEC: 1.01 — SIGNIFICANT CHANGE UP (ref 1.01–1.02)
UROBILINOGEN FLD QL: NEGATIVE — SIGNIFICANT CHANGE UP
WBC # BLD: 12.8 K/UL — HIGH (ref 3.8–10.5)
WBC # FLD AUTO: 12.8 K/UL — HIGH (ref 3.8–10.5)

## 2019-01-19 PROCEDURE — 93010 ELECTROCARDIOGRAM REPORT: CPT

## 2019-01-19 PROCEDURE — 99223 1ST HOSP IP/OBS HIGH 75: CPT

## 2019-01-19 PROCEDURE — 99223 1ST HOSP IP/OBS HIGH 75: CPT | Mod: GC

## 2019-01-19 RX ADMIN — Medication 650 MILLIGRAM(S): at 17:31

## 2019-01-19 RX ADMIN — OXYCODONE HYDROCHLORIDE 5 MILLIGRAM(S): 5 TABLET ORAL at 14:26

## 2019-01-19 RX ADMIN — Medication 650 MILLIGRAM(S): at 04:24

## 2019-01-19 RX ADMIN — METHOCARBAMOL 500 MILLIGRAM(S): 500 TABLET, FILM COATED ORAL at 19:53

## 2019-01-19 RX ADMIN — OXYCODONE HYDROCHLORIDE 10 MILLIGRAM(S): 5 TABLET ORAL at 06:12

## 2019-01-19 RX ADMIN — MONTELUKAST 10 MILLIGRAM(S): 4 TABLET, CHEWABLE ORAL at 11:51

## 2019-01-19 RX ADMIN — Medication 100 MILLIGRAM(S): at 04:27

## 2019-01-19 RX ADMIN — Medication 650 MILLIGRAM(S): at 23:46

## 2019-01-19 RX ADMIN — Medication 100 MILLIGRAM(S): at 21:34

## 2019-01-19 RX ADMIN — BUDESONIDE AND FORMOTEROL FUMARATE DIHYDRATE 2 PUFF(S): 160; 4.5 AEROSOL RESPIRATORY (INHALATION) at 08:53

## 2019-01-19 RX ADMIN — OXYCODONE HYDROCHLORIDE 10 MILLIGRAM(S): 5 TABLET ORAL at 08:29

## 2019-01-19 RX ADMIN — LIDOCAINE 1 PATCH: 4 CREAM TOPICAL at 23:59

## 2019-01-19 RX ADMIN — Medication 650 MILLIGRAM(S): at 11:45

## 2019-01-19 RX ADMIN — Medication 650 MILLIGRAM(S): at 10:45

## 2019-01-19 RX ADMIN — TIOTROPIUM BROMIDE 1 CAPSULE(S): 18 CAPSULE ORAL; RESPIRATORY (INHALATION) at 08:53

## 2019-01-19 RX ADMIN — OXYCODONE HYDROCHLORIDE 10 MILLIGRAM(S): 5 TABLET ORAL at 04:22

## 2019-01-19 RX ADMIN — METHOCARBAMOL 500 MILLIGRAM(S): 500 TABLET, FILM COATED ORAL at 11:51

## 2019-01-19 RX ADMIN — OXYCODONE HYDROCHLORIDE 10 MILLIGRAM(S): 5 TABLET ORAL at 23:46

## 2019-01-19 RX ADMIN — ENOXAPARIN SODIUM 40 MILLIGRAM(S): 100 INJECTION SUBCUTANEOUS at 06:12

## 2019-01-19 RX ADMIN — POLYETHYLENE GLYCOL 3350 17 GRAM(S): 17 POWDER, FOR SOLUTION ORAL at 11:50

## 2019-01-19 RX ADMIN — Medication 650 MILLIGRAM(S): at 06:11

## 2019-01-19 RX ADMIN — OXYCODONE HYDROCHLORIDE 5 MILLIGRAM(S): 5 TABLET ORAL at 15:15

## 2019-01-19 RX ADMIN — BUDESONIDE AND FORMOTEROL FUMARATE DIHYDRATE 2 PUFF(S): 160; 4.5 AEROSOL RESPIRATORY (INHALATION) at 21:08

## 2019-01-19 RX ADMIN — Medication 5 MILLIGRAM(S): at 10:45

## 2019-01-19 RX ADMIN — OXYCODONE HYDROCHLORIDE 10 MILLIGRAM(S): 5 TABLET ORAL at 19:30

## 2019-01-19 RX ADMIN — PANTOPRAZOLE SODIUM 40 MILLIGRAM(S): 20 TABLET, DELAYED RELEASE ORAL at 04:27

## 2019-01-19 RX ADMIN — LIDOCAINE 1 PATCH: 4 CREAM TOPICAL at 11:50

## 2019-01-19 RX ADMIN — Medication 50 MICROGRAM(S): at 04:27

## 2019-01-19 RX ADMIN — Medication 100 MILLIGRAM(S): at 06:12

## 2019-01-19 RX ADMIN — Medication 100 MILLIGRAM(S): at 17:27

## 2019-01-19 RX ADMIN — ATORVASTATIN CALCIUM 80 MILLIGRAM(S): 80 TABLET, FILM COATED ORAL at 21:34

## 2019-01-19 RX ADMIN — LIDOCAINE 1 PATCH: 4 CREAM TOPICAL at 21:37

## 2019-01-19 RX ADMIN — OXYCODONE HYDROCHLORIDE 10 MILLIGRAM(S): 5 TABLET ORAL at 18:53

## 2019-01-19 RX ADMIN — SENNA PLUS 2 TABLET(S): 8.6 TABLET ORAL at 21:34

## 2019-01-19 RX ADMIN — Medication 650 MILLIGRAM(S): at 18:00

## 2019-01-19 RX ADMIN — Medication 100 MILLIGRAM(S): at 14:04

## 2019-01-19 RX ADMIN — Medication 200 MILLIGRAM(S): at 23:46

## 2019-01-19 RX ADMIN — OXYCODONE HYDROCHLORIDE 10 MILLIGRAM(S): 5 TABLET ORAL at 09:15

## 2019-01-19 NOTE — CONSULT NOTE ADULT - SUBJECTIVE AND OBJECTIVE BOX
Patient is a 63y old  Female who presents with a chief complaint of Cervical Spinal Stenosis (18 Jan 2019 13:33)    HPI:  63F with PMH of HLD, hypothyroidism, GERD, depression/anxiety, asthma, s/p lumbar fusion in 2006 and multilevel ACDF in 2010 presenting with UE weakness and numbness as well neck and arm pain found to have severe multilevel cervical stenosis admitted to [facility] on 1/11/19 s/p C2-T2 posterior decompression and fusion on 1/11/19 being admitted here for rehab. Pt developed UE weakness and numbness as well as neck and arm pain. She was found to have severe multilevel cervical stenosis despite previous ACDF. She presented to [facility] on 1/11/19 for elective C2-T2 posterior decompression with laminectomy, facetectomy, foraminectomy from C2 to T1. She was managed post op for pain. Her hospital course was complicated by acute onset right thumb numbness. CT cervical/thoracic spine was unremarkable. On POD5, she presented with fever. She was pan cultured and started empirically on antibiotics (zpsyn) for possible UTI for 24 hours.  Infections workup (UA, urine culture, blood culture, CXR) were negative. Antibiotics were discontinued and patient remained stable off of antibiotics.   Patient was medically stabilized and admitted here for rehab. (18 Jan 2019 13:33)    PAST MEDICAL & SURGICAL HISTORY:  Depression  Hypothyroidism  Spinal stenosis  GERD (Gastroesophageal Reflux Disease)  Chronic Bronchitis  Asthma: trigger getting a cold. hospitalized multipl  times last 2007 denies intubation  Hyperlipidemia  Osteopenia  Cervical Disc Displacement: current diagnosis  Cervical vertebral fusion  S/P Colonoscopy: 2005  wnl  S/P Foot Surgery: ORIF left foot  S/P Cholecystectomy: open   1989  Kyphosis  Termination of Pregnancy: x3  remote    SOCIAL HISTORY:  Tobacco Usage:  (  x ) never smoked   (   ) former smoker   (   ) current smoker  (     ) pack years    Tobacco Quit Date:  Substance Use (Street drugs): (x  ) never used  (  ) other:  Alcohol Usage: Denies     Family history reviewed and otherwise non-contributory  ALLERGIES:  No Known Allergies    MEDICATIONS:  atorvastatin 80 milliGRAM(s) Oral at bedtime  diazepam    Tablet 5 milliGRAM(s) Oral daily PRN  lidocaine   Patch 1 Patch Transdermal daily  methocarbamol 500 milliGRAM(s) Oral every 8 hours PRN  oxyCODONE    IR 10 milliGRAM(s) Oral every 4 hours PRN  oxyCODONE    IR 5 milliGRAM(s) Oral every 4 hours PRN  pregabalin 100 milliGRAM(s) Oral every 8 hours    REVIEW OF SYSTEMS:  CONSTITUTIONAL: No fever, weight loss, or fatigue  EYES: No eye pain, visual disturbances, or discharge  ENMT:  No difficulty hearing, tinnitus, vertigo; No sinus or throat pain  NECK: neck pain +   BREASTS: No pain, masses, or nipple discharge  RESPIRATORY: No cough, wheezing, chills or hemoptysis; No shortness of breath  CARDIOVASCULAR: No chest pain, palpitations, dizziness, or leg swelling  GASTROINTESTINAL: No abdominal pain, No nausea, vomiting, or hematemesis; No diarrhea or constipation  GENITOURINARY: No dysuria, frequency, hematuria, or incontinence  NEUROLOGICAL: No headaches, memory loss, loss of strength, numbness, or tremors  SKIN: No itching, burning, rashes, or lesions   LYMPH NODES: No enlarged glands  ENDOCRINE: No heat or cold intolerance; No hair loss  MUSCULOSKELETAL: No joint pain or swelling; No muscle, back, or extremity pain  PSYCHIATRIC: No depression, anxiety, mood swings, or difficulty sleeping  HEME/LYMPH: No easy bruising or bleeding  ALLERY AND IMMUNOLOGIC: No hives or eczema    All other ROS reviewed and negative except as otherwise stated    Vital Signs Last 24 Hrs  T(F): 98.3 (19 Jan 2019 08:55), Max: 101 (18 Jan 2019 15:23)  HR: 72 (19 Jan 2019 08:55) (72 - 89)  BP: 113/72 (19 Jan 2019 08:55) (113/72 - 156/84)  RR: 14 (19 Jan 2019 08:55) (14 - 14)  SpO2: 99% (19 Jan 2019 08:55) (95% - 99%)  I&O's Summary    18 Jan 2019 07:01  -  19 Jan 2019 07:00  --------------------------------------------------------  IN: 0 mL / OUT: 1 mL / NET: -1 mL      PHYSICAL EXAM:  GENERAL: NAD, well-groomed, well-developed  HEAD:  Atraumatic, Normocephalic  EYES: EOMI, PERRLA, conjunctiva and sclera clear  ENMT: No tonsillar erythema, exudates, or enlargement; Moist mucous membranes, Good dentition  NECK: Supple, No JVD, Neck collar   CHEST/LUNG: Clear to auscultation bilaterally; No rales, rhonchi, wheezing, or rubs  HEART: Regular rate and rhythm; S1/S2, No murmurs, rubs, or gallops  ABDOMEN: Soft, Nontender, Nondistended; Bowel sounds present  VASCULAR: Normal pulses, Normal capillary refill  EXTREMITIES:  2+ Peripheral Pulses, No cyanosis, No edema  LYMPH: No lymphadenopathy noted  SKIN: Warm, Intact  PSYCH: Normal mood and affect  NERVOUS SYSTEM:  A/O x3, Good concentration; CN 2-12 intact, No focal deficits    LABS:                        9.2    12.8  )-----------( 432      ( 19 Jan 2019 06:50 )             28.1     01-19    136  |  99  |  6   ----------------------------<  103  3.5   |  29  |  0.64    Ca    8.9      19 Jan 2019 06:50  Mg     1.6     01-18    TPro  6.1  /  Alb  2.0  /  TBili  0.3  /  DBili  x   /  AST  24  /  ALT  14  /  AlkPhos  85  01-19    eGFR if Non African American: 94 mL/min/1.73M2 (01-19-19 @ 06:50)  eGFR if : 109 mL/min/1.73M2 (01-19-19 @ 06:50)      Lactate, Blood: 1.2 mmol/L (01-18 @ 16:55)              CAPILLARY BLOOD GLUCOSE                RADIOLOGY & ADDITIONAL TESTS:    Care Discussed with Consultants/Other Providers:

## 2019-01-19 NOTE — DIETITIAN INITIAL EVALUATION ADULT. - OTHER INFO
63yr Old Female - Dx: Spinal Stenosis - Initial Assessment - DASH-TLC Diet w/ Thin Liquids, Denies Food Allergy, Tolerates Diet, No Chewing/Swallowing Complications (Per Pt), No Pressure Ulcers, No Edema, No Recent N/V/D/C

## 2019-01-19 NOTE — CONSULT NOTE ADULT - ASSESSMENT
63F with PMH of HLD, hypothyroidism, GERD, depression/anxiety, asthma, s/p lumbar fusion in 2006 and multilevel ACDF in 2010 presenting with UE weakness and numbness as well neck and arm pain found to have severe multilevel cervical stenosis admitted to [facility] on 1/11/19 s/p C2-T2 posterior decompression and fusion on 1/11/19          #Cervical stenosis: s/p C2-T2 posterior decompression and fusion  c/w PT/OT   Cervical collar  Pain management     #Fever   afebrile today   - Leucocytosis improving   - CXR with LLL opacity likely atelectasis done at OSH  f/u UA, Blood Cx , CBC   Lactate negative ; Monitor vitals    #Asthma: Stable   c/w  Symbicort, Spiriva  c/w  montelukast    #HLD  c/w Atorvastatin     #Hypothyroidism;  c/w  Levothyroxine    # DVT px  Lovenox

## 2019-01-20 PROCEDURE — 99233 SBSQ HOSP IP/OBS HIGH 50: CPT

## 2019-01-20 PROCEDURE — 99232 SBSQ HOSP IP/OBS MODERATE 35: CPT | Mod: GC

## 2019-01-20 RX ADMIN — Medication 100 MILLIGRAM(S): at 17:28

## 2019-01-20 RX ADMIN — OXYCODONE HYDROCHLORIDE 5 MILLIGRAM(S): 5 TABLET ORAL at 11:30

## 2019-01-20 RX ADMIN — Medication 650 MILLIGRAM(S): at 08:25

## 2019-01-20 RX ADMIN — Medication 650 MILLIGRAM(S): at 21:21

## 2019-01-20 RX ADMIN — Medication 100 MILLIGRAM(S): at 06:46

## 2019-01-20 RX ADMIN — SENNA PLUS 2 TABLET(S): 8.6 TABLET ORAL at 21:27

## 2019-01-20 RX ADMIN — TIOTROPIUM BROMIDE 1 CAPSULE(S): 18 CAPSULE ORAL; RESPIRATORY (INHALATION) at 09:41

## 2019-01-20 RX ADMIN — ENOXAPARIN SODIUM 40 MILLIGRAM(S): 100 INJECTION SUBCUTANEOUS at 06:45

## 2019-01-20 RX ADMIN — BUDESONIDE AND FORMOTEROL FUMARATE DIHYDRATE 2 PUFF(S): 160; 4.5 AEROSOL RESPIRATORY (INHALATION) at 20:36

## 2019-01-20 RX ADMIN — BUDESONIDE AND FORMOTEROL FUMARATE DIHYDRATE 2 PUFF(S): 160; 4.5 AEROSOL RESPIRATORY (INHALATION) at 09:38

## 2019-01-20 RX ADMIN — Medication 100 MILLIGRAM(S): at 21:27

## 2019-01-20 RX ADMIN — OXYCODONE HYDROCHLORIDE 5 MILLIGRAM(S): 5 TABLET ORAL at 21:22

## 2019-01-20 RX ADMIN — LIDOCAINE 1 PATCH: 4 CREAM TOPICAL at 12:23

## 2019-01-20 RX ADMIN — Medication 650 MILLIGRAM(S): at 13:36

## 2019-01-20 RX ADMIN — ATORVASTATIN CALCIUM 80 MILLIGRAM(S): 80 TABLET, FILM COATED ORAL at 21:26

## 2019-01-20 RX ADMIN — MONTELUKAST 10 MILLIGRAM(S): 4 TABLET, CHEWABLE ORAL at 12:23

## 2019-01-20 RX ADMIN — Medication 100 MILLIGRAM(S): at 13:33

## 2019-01-20 RX ADMIN — OXYCODONE HYDROCHLORIDE 10 MILLIGRAM(S): 5 TABLET ORAL at 14:57

## 2019-01-20 RX ADMIN — Medication 650 MILLIGRAM(S): at 00:16

## 2019-01-20 RX ADMIN — POLYETHYLENE GLYCOL 3350 17 GRAM(S): 17 POWDER, FOR SOLUTION ORAL at 12:22

## 2019-01-20 RX ADMIN — Medication 650 MILLIGRAM(S): at 15:00

## 2019-01-20 RX ADMIN — OXYCODONE HYDROCHLORIDE 10 MILLIGRAM(S): 5 TABLET ORAL at 00:16

## 2019-01-20 RX ADMIN — Medication 200 MILLIGRAM(S): at 21:27

## 2019-01-20 RX ADMIN — OXYCODONE HYDROCHLORIDE 5 MILLIGRAM(S): 5 TABLET ORAL at 10:40

## 2019-01-20 RX ADMIN — Medication 50 MICROGRAM(S): at 06:46

## 2019-01-20 RX ADMIN — METHOCARBAMOL 500 MILLIGRAM(S): 500 TABLET, FILM COATED ORAL at 04:31

## 2019-01-20 RX ADMIN — LIDOCAINE 1 PATCH: 4 CREAM TOPICAL at 19:30

## 2019-01-20 RX ADMIN — PANTOPRAZOLE SODIUM 40 MILLIGRAM(S): 20 TABLET, DELAYED RELEASE ORAL at 06:46

## 2019-01-20 RX ADMIN — OXYCODONE HYDROCHLORIDE 10 MILLIGRAM(S): 5 TABLET ORAL at 21:52

## 2019-01-20 RX ADMIN — Medication 650 MILLIGRAM(S): at 14:23

## 2019-01-20 NOTE — PROGRESS NOTE ADULT - ASSESSMENT
63F with PMH of HLD, hypothyroidism, GERD, depression/anxiety, asthma, s/p lumbar fusion in 2006 and multilevel ACDF in 2010 presenting with UE weakness and numbness as well neck and arm pain found to have severe multilevel cervical stenosis admitted to [facility] on 1/11/19 s/p C2-T2 posterior decompression and fusion on 1/11/19          #Cervical stenosis: s/p C2-T2 posterior decompression and fusion  c/w PT/OT   Cervical collar & Pain management     #Fever - resolved   f/u UA, Blood Cx , CBC     #Asthma: Stable   c/w  Symbicort, Spiriva  c/w  montelukast    #HLD  c/w Atorvastatin     #Hypothyroidism;  c/w  Levothyroxine    # DVT px  Lovenox

## 2019-01-20 NOTE — PROVIDER CONTACT NOTE (OTHER) - BACKGROUND
Pt states she has had headaches since surgery but appear to be getting worse 8/10  poorly relieved to 6or 7 /10

## 2019-01-20 NOTE — PROGRESS NOTE ADULT - SUBJECTIVE AND OBJECTIVE BOX
Patient is a 63y old  Female who presents with a chief complaint of Cervical Spinal Stenosis (2019 11:14)      Patient seen and examined at bedside. No new complaints, pain in neck is same, sleeping well,     ALLERGIES:  No Known Allergies    MEDICATIONS:  atorvastatin 80 milliGRAM(s) Oral at bedtime  diazepam    Tablet 5 milliGRAM(s) Oral daily PRN  guaiFENesin    Syrup 200 milliGRAM(s) Oral at bedtime PRN  lidocaine   Patch 1 Patch Transdermal daily  methocarbamol 500 milliGRAM(s) Oral every 8 hours PRN  oxyCODONE    IR 10 milliGRAM(s) Oral every 4 hours PRN  oxyCODONE    IR 5 milliGRAM(s) Oral every 4 hours PRN  pregabalin 100 milliGRAM(s) Oral every 8 hours    Vital Signs Last 24 Hrs  T(F): 98.7 (2019 19:41), Max: 98.7 (2019 19:41)  HR: 77 (2019 19:41) (77 - 77)  BP: 130/78 (2019 19:41) (130/78 - 130/78)  RR: 14 (2019 19:41) (14 - 14)  SpO2: 97% (2019 19:41) (97% - 97%)  I&O's Summary      PHYSICAL EXAM:  General: NAD, comfortable   ENT: MMM  Neck: Supple, No JVD  Lungs: CTA, BLAE, No added sounds.   Cardio: RRR, S1/S2, No murmurs  Abdomen: Soft, NT/ND, BS+   Extremities: No clubbing, cyanosis, or edema  CNS: nonfocal     LABS:                        9.2    12.8  )-----------( 432      ( 2019 06:50 )             28.1         136  |  99  |  6   ----------------------------<  103  3.5   |  29  |  0.64    Ca    8.9      2019 06:50  Mg     1.6         TPro  6.1  /  Alb  2.0  /  TBili  0.3  /  DBili  x   /  AST  24  /  ALT  14  /  AlkPhos  85      eGFR if Non African American: 94 mL/min/1.73M2 (19 @ 06:50)  eGFR if : 109 mL/min/1.73M2 (19 @ 06:50)      Lactate, Blood: 1.2 mmol/L ( @ 16:55)                CAPILLARY BLOOD GLUCOSE          Urinalysis Basic - ( 2019 12:52 )    Color: Yellow / Appearance: Clear / S.015 / pH: x  Gluc: x / Ketone: Negative  / Bili: Negative / Urobili: Negative   Blood: x / Protein: Negative / Nitrite: Negative   Leuk Esterase: Negative / RBC: x / WBC x   Sq Epi: x / Non Sq Epi: x / Bacteria: x        Culture - Blood (collected 2019 16:55)  Source: .Blood Blood  Preliminary Report (2019 19:01):    No growth to date.    Culture - Blood (collected 2019 16:55)  Source: .Blood Blood  Preliminary Report (2019 19:01):    No growth to date.        RADIOLOGY & ADDITIONAL TESTS:    Care Discussed with Consultants/Other Providers:

## 2019-01-20 NOTE — PROGRESS NOTE ADULT - SUBJECTIVE AND OBJECTIVE BOX
Cc: Gait dysfunction    HPI: Patient with no new medical issues today.  Pain controlled, no chest pain, no N/V, no Fevers/Chills. No other new ROS  Has been tolerating rehabilitation program.    acetaminophen   Tablet .. 650 milliGRAM(s) Oral every 6 hours PRN  atorvastatin 80 milliGRAM(s) Oral at bedtime  buDESOnide 160 MICROgram(s)/formoterol 4.5 MICROgram(s) Inhaler 2 Puff(s) Inhalation two times a day  diazepam    Tablet 5 milliGRAM(s) Oral daily PRN  docusate sodium 100 milliGRAM(s) Oral two times a day  enoxaparin Injectable 40 milliGRAM(s) SubCutaneous every 24 hours  guaiFENesin    Syrup 200 milliGRAM(s) Oral at bedtime PRN  levothyroxine 50 MICROGram(s) Oral daily  lidocaine   Patch 1 Patch Transdermal daily  methocarbamol 500 milliGRAM(s) Oral every 8 hours PRN  montelukast 10 milliGRAM(s) Oral daily  oxyCODONE    IR 10 milliGRAM(s) Oral every 4 hours PRN  oxyCODONE    IR 5 milliGRAM(s) Oral every 4 hours PRN  pantoprazole    Tablet 40 milliGRAM(s) Oral before breakfast  polyethylene glycol 3350 17 Gram(s) Oral daily  pregabalin 100 milliGRAM(s) Oral every 8 hours  senna 2 Tablet(s) Oral at bedtime  tiotropium 18 MICROgram(s) Capsule 1 Capsule(s) Inhalation daily      T(C): 36.7 (01-20-19 @ 10:04), Max: 37.1 (01-19-19 @ 19:41)  HR: 75 (01-20-19 @ 10:04) (75 - 77)  BP: 115/76 (01-20-19 @ 10:04) (115/76 - 130/78)  RR: 12 (01-20-19 @ 10:04) (12 - 14)  SpO2: 95% (01-20-19 @ 10:04) (95% - 97%)    In NAD  HEENT- EOMI  Heart- RRR, S1S2  Lungs- CTA bl.  Abd- + BS, NT  Ext- No calf pain  Neuro- Exam unchanged          Imp: Patient with diagnosis of  C2-T2 posterior decompression and fusion admitted for comprehensive acute rehabilitation.    Plan:    - Continue PT/OT/SLP  - DVT prophylaxis-Lovenox   - Skin- Turn q2h, check skin daily  - Continue current medications; patient medically stable.   -Active issues- fever resolved, leukocytosis resolved. BCx neg to date   -Pain -Lidocaine patch, Oxycodone  -Anxiety- Valium PRN( home med)  - Patient is stable to continue current rehabilitation program.

## 2019-01-21 PROCEDURE — 99232 SBSQ HOSP IP/OBS MODERATE 35: CPT | Mod: GC

## 2019-01-21 RX ADMIN — OXYCODONE HYDROCHLORIDE 10 MILLIGRAM(S): 5 TABLET ORAL at 19:25

## 2019-01-21 RX ADMIN — OXYCODONE HYDROCHLORIDE 10 MILLIGRAM(S): 5 TABLET ORAL at 15:10

## 2019-01-21 RX ADMIN — TIOTROPIUM BROMIDE 1 CAPSULE(S): 18 CAPSULE ORAL; RESPIRATORY (INHALATION) at 08:30

## 2019-01-21 RX ADMIN — ATORVASTATIN CALCIUM 80 MILLIGRAM(S): 80 TABLET, FILM COATED ORAL at 21:33

## 2019-01-21 RX ADMIN — Medication 100 MILLIGRAM(S): at 12:34

## 2019-01-21 RX ADMIN — ENOXAPARIN SODIUM 40 MILLIGRAM(S): 100 INJECTION SUBCUTANEOUS at 05:15

## 2019-01-21 RX ADMIN — POLYETHYLENE GLYCOL 3350 17 GRAM(S): 17 POWDER, FOR SOLUTION ORAL at 12:20

## 2019-01-21 RX ADMIN — METHOCARBAMOL 500 MILLIGRAM(S): 500 TABLET, FILM COATED ORAL at 19:29

## 2019-01-21 RX ADMIN — Medication 100 MILLIGRAM(S): at 05:07

## 2019-01-21 RX ADMIN — Medication 100 MILLIGRAM(S): at 21:34

## 2019-01-21 RX ADMIN — Medication 650 MILLIGRAM(S): at 07:50

## 2019-01-21 RX ADMIN — SENNA PLUS 2 TABLET(S): 8.6 TABLET ORAL at 21:33

## 2019-01-21 RX ADMIN — PANTOPRAZOLE SODIUM 40 MILLIGRAM(S): 20 TABLET, DELAYED RELEASE ORAL at 08:45

## 2019-01-21 RX ADMIN — LIDOCAINE 1 PATCH: 4 CREAM TOPICAL at 00:24

## 2019-01-21 RX ADMIN — LIDOCAINE 1 PATCH: 4 CREAM TOPICAL at 12:20

## 2019-01-21 RX ADMIN — METHOCARBAMOL 500 MILLIGRAM(S): 500 TABLET, FILM COATED ORAL at 10:20

## 2019-01-21 RX ADMIN — LIDOCAINE 1 PATCH: 4 CREAM TOPICAL at 19:36

## 2019-01-21 RX ADMIN — OXYCODONE HYDROCHLORIDE 10 MILLIGRAM(S): 5 TABLET ORAL at 10:29

## 2019-01-21 RX ADMIN — Medication 650 MILLIGRAM(S): at 21:34

## 2019-01-21 RX ADMIN — OXYCODONE HYDROCHLORIDE 10 MILLIGRAM(S): 5 TABLET ORAL at 15:31

## 2019-01-21 RX ADMIN — OXYCODONE HYDROCHLORIDE 10 MILLIGRAM(S): 5 TABLET ORAL at 20:36

## 2019-01-21 RX ADMIN — OXYCODONE HYDROCHLORIDE 10 MILLIGRAM(S): 5 TABLET ORAL at 10:18

## 2019-01-21 RX ADMIN — Medication 650 MILLIGRAM(S): at 23:00

## 2019-01-21 RX ADMIN — OXYCODONE HYDROCHLORIDE 10 MILLIGRAM(S): 5 TABLET ORAL at 05:06

## 2019-01-21 RX ADMIN — Medication 50 MICROGRAM(S): at 05:42

## 2019-01-21 RX ADMIN — OXYCODONE HYDROCHLORIDE 10 MILLIGRAM(S): 5 TABLET ORAL at 06:32

## 2019-01-21 RX ADMIN — Medication 100 MILLIGRAM(S): at 05:06

## 2019-01-21 RX ADMIN — METHOCARBAMOL 500 MILLIGRAM(S): 500 TABLET, FILM COATED ORAL at 01:11

## 2019-01-21 RX ADMIN — MONTELUKAST 10 MILLIGRAM(S): 4 TABLET, CHEWABLE ORAL at 12:20

## 2019-01-21 RX ADMIN — Medication 650 MILLIGRAM(S): at 08:25

## 2019-01-21 RX ADMIN — BUDESONIDE AND FORMOTEROL FUMARATE DIHYDRATE 2 PUFF(S): 160; 4.5 AEROSOL RESPIRATORY (INHALATION) at 08:28

## 2019-01-21 RX ADMIN — Medication 100 MILLIGRAM(S): at 17:20

## 2019-01-21 NOTE — PROGRESS NOTE ADULT - SUBJECTIVE AND OBJECTIVE BOX
Cc: Gait dysfunction    HPI: Patient with no new medical issues today.slept well   Pain controlled, no chest pain, no N/V, no Fevers/Chills. No other new ROS  Has been tolerating rehabilitation program.    Vital Signs Last 24 Hrs  T(C): 36.9 (21 Jan 2019 07:54), Max: 37.4 (20 Jan 2019 20:20)  T(F): 98.5 (21 Jan 2019 07:54), Max: 99.3 (20 Jan 2019 20:20)  HR: 84 (21 Jan 2019 08:45) (76 - 84)  BP: 142/83 (21 Jan 2019 07:54) (128/82 - 142/83)  BP(mean): --  RR: 14 (21 Jan 2019 07:54) (14 - 14)  SpO2: 95% (21 Jan 2019 08:45) (93% - 96%)    In NAD  HEENT- EOMI  Heart- RRR, S1S2  Lungs- CTA bl.  Abd- + BS, NT  Ext- No calf pain  Neuro- Exam unchanged          Imp: Patient with diagnosis of  C2-T2 posterior decompression and fusion admitted for comprehensive acute rehabilitation.    Plan:    - Continue PT/OT/SLP  - DVT prophylaxis-Lovenox   - Skin- Turn q2h, check skin daily  - Continue current medications; patient medically stable.   -Active issues- fever resolved, leukocytosis resolved. BCx neg to date   -Pain -Lidocaine patch, Oxycodone  -Anxiety- Valium PRN( home med)  - Patient is stable to continue current rehabilitation program.

## 2019-01-22 PROCEDURE — 99232 SBSQ HOSP IP/OBS MODERATE 35: CPT

## 2019-01-22 RX ADMIN — OXYCODONE HYDROCHLORIDE 10 MILLIGRAM(S): 5 TABLET ORAL at 08:36

## 2019-01-22 RX ADMIN — Medication 100 MILLIGRAM(S): at 17:56

## 2019-01-22 RX ADMIN — PANTOPRAZOLE SODIUM 40 MILLIGRAM(S): 20 TABLET, DELAYED RELEASE ORAL at 06:49

## 2019-01-22 RX ADMIN — ENOXAPARIN SODIUM 40 MILLIGRAM(S): 100 INJECTION SUBCUTANEOUS at 06:50

## 2019-01-22 RX ADMIN — Medication 650 MILLIGRAM(S): at 16:34

## 2019-01-22 RX ADMIN — OXYCODONE HYDROCHLORIDE 10 MILLIGRAM(S): 5 TABLET ORAL at 19:30

## 2019-01-22 RX ADMIN — OXYCODONE HYDROCHLORIDE 10 MILLIGRAM(S): 5 TABLET ORAL at 01:07

## 2019-01-22 RX ADMIN — OXYCODONE HYDROCHLORIDE 10 MILLIGRAM(S): 5 TABLET ORAL at 01:24

## 2019-01-22 RX ADMIN — Medication 50 MICROGRAM(S): at 06:49

## 2019-01-22 RX ADMIN — TIOTROPIUM BROMIDE 1 CAPSULE(S): 18 CAPSULE ORAL; RESPIRATORY (INHALATION) at 10:15

## 2019-01-22 RX ADMIN — POLYETHYLENE GLYCOL 3350 17 GRAM(S): 17 POWDER, FOR SOLUTION ORAL at 11:39

## 2019-01-22 RX ADMIN — Medication 100 MILLIGRAM(S): at 14:04

## 2019-01-22 RX ADMIN — OXYCODONE HYDROCHLORIDE 10 MILLIGRAM(S): 5 TABLET ORAL at 15:20

## 2019-01-22 RX ADMIN — LIDOCAINE 1 PATCH: 4 CREAM TOPICAL at 11:39

## 2019-01-22 RX ADMIN — BUDESONIDE AND FORMOTEROL FUMARATE DIHYDRATE 2 PUFF(S): 160; 4.5 AEROSOL RESPIRATORY (INHALATION) at 10:13

## 2019-01-22 RX ADMIN — LIDOCAINE 1 PATCH: 4 CREAM TOPICAL at 19:20

## 2019-01-22 RX ADMIN — LIDOCAINE 1 PATCH: 4 CREAM TOPICAL at 00:30

## 2019-01-22 RX ADMIN — Medication 650 MILLIGRAM(S): at 07:33

## 2019-01-22 RX ADMIN — Medication 650 MILLIGRAM(S): at 17:30

## 2019-01-22 RX ADMIN — Medication 650 MILLIGRAM(S): at 06:49

## 2019-01-22 RX ADMIN — SENNA PLUS 2 TABLET(S): 8.6 TABLET ORAL at 21:52

## 2019-01-22 RX ADMIN — OXYCODONE HYDROCHLORIDE 10 MILLIGRAM(S): 5 TABLET ORAL at 09:14

## 2019-01-22 RX ADMIN — Medication 200 MILLIGRAM(S): at 14:04

## 2019-01-22 RX ADMIN — OXYCODONE HYDROCHLORIDE 10 MILLIGRAM(S): 5 TABLET ORAL at 19:09

## 2019-01-22 RX ADMIN — Medication 100 MILLIGRAM(S): at 06:49

## 2019-01-22 RX ADMIN — METHOCARBAMOL 500 MILLIGRAM(S): 500 TABLET, FILM COATED ORAL at 09:37

## 2019-01-22 RX ADMIN — ATORVASTATIN CALCIUM 80 MILLIGRAM(S): 80 TABLET, FILM COATED ORAL at 21:52

## 2019-01-22 RX ADMIN — MONTELUKAST 10 MILLIGRAM(S): 4 TABLET, CHEWABLE ORAL at 11:39

## 2019-01-22 RX ADMIN — Medication 100 MILLIGRAM(S): at 21:52

## 2019-01-22 RX ADMIN — OXYCODONE HYDROCHLORIDE 10 MILLIGRAM(S): 5 TABLET ORAL at 14:37

## 2019-01-22 NOTE — PROGRESS NOTE ADULT - SUBJECTIVE AND OBJECTIVE BOX
Patient is a 63y old  Female who presents with a chief complaint of Cervical Spinal Stenosis (22 Jan 2019 11:05)    Patient seen and examined at bedside.    ALLERGIES:  No Known Allergies    MEDICATIONS  (STANDING):  atorvastatin 80 milliGRAM(s) Oral at bedtime  buDESOnide 160 MICROgram(s)/formoterol 4.5 MICROgram(s) Inhaler 2 Puff(s) Inhalation two times a day  docusate sodium 100 milliGRAM(s) Oral two times a day  enoxaparin Injectable 40 milliGRAM(s) SubCutaneous every 24 hours  levothyroxine 50 MICROGram(s) Oral daily  lidocaine   Patch 1 Patch Transdermal daily  montelukast 10 milliGRAM(s) Oral daily  pantoprazole    Tablet 40 milliGRAM(s) Oral before breakfast  polyethylene glycol 3350 17 Gram(s) Oral daily  pregabalin 100 milliGRAM(s) Oral every 8 hours  senna 2 Tablet(s) Oral at bedtime  tiotropium 18 MICROgram(s) Capsule 1 Capsule(s) Inhalation daily    MEDICATIONS  (PRN):  acetaminophen   Tablet .. 650 milliGRAM(s) Oral every 6 hours PRN Temp greater or equal to 38C (100.4F), Mild Pain (1 - 3)  diazepam    Tablet 5 milliGRAM(s) Oral daily PRN Anxiety  guaiFENesin    Syrup 200 milliGRAM(s) Oral at bedtime PRN Cough  methocarbamol 500 milliGRAM(s) Oral every 8 hours PRN Muscle Spasm  oxyCODONE    IR 10 milliGRAM(s) Oral every 4 hours PRN Severe Pain (7 - 10)  oxyCODONE    IR 5 milliGRAM(s) Oral every 4 hours PRN Moderate Pain (4 - 6)    Vital Signs Last 24 Hrs  T(F): 98.5 (22 Jan 2019 08:45), Max: 99.2 (21 Jan 2019 20:24)  HR: 74 (22 Jan 2019 08:45) (74 - 89)  BP: 108/62 (22 Jan 2019 08:45) (108/62 - 135/80)  RR: 14 (22 Jan 2019 08:45) (14 - 16)  SpO2: 98% (22 Jan 2019 08:45) (96% - 98%)  I&O's Summary    PHYSICAL EXAM:  General: NAD, A/O x 3  ENT: MMM  Neck: Neck brace in place  Lungs: Clear to auscultation bilaterally  Cardio: RRR, S1/S2, + systolic murmur  Abdomen: Soft, Nontender, Nondistended; Bowel sounds present  Extremities: No calf tenderness, No pitting edema      Culture - Blood (collected 18 Jan 2019 16:55)  Source: .Blood Blood  Preliminary Report (19 Jan 2019 19:01):    No growth to date.    Culture - Blood (collected 18 Jan 2019 16:55)  Source: .Blood Blood  Preliminary Report (19 Jan 2019 19:01):    No growth to date.

## 2019-01-22 NOTE — PROGRESS NOTE ADULT - ASSESSMENT
63F with PMH of HLD, hypothyroidism, GERD, depression/anxiety, asthma, s/p lumbar fusion in 2006 and multilevel ACDF in 2010 presenting with upper extremity weakness and numbness as well neck and arm pain found to have severe multilevel cervical stenosis admitted to [facility] on 1/11/19 s/p C2-T2 posterior decompression and fusion on 1/11/19     #Cervical stenosis: s/p C2-T2 posterior decompression and fusion  c/w PT/OT   Cervical collar   Pain management     #Asthma: Stable   c/w  Symbicort, Spiriva  c/w  montelukast    #HLD  c/w Atorvastatin     #Hypothyroidism;  c/w  Levothyroxine    # DVT px  Lovenox

## 2019-01-22 NOTE — PROGRESS NOTE ADULT - ASSESSMENT
ASSESSMENT/PLAN  63F with PMH of HLD, hypothyroidism, GERD, depression/anxiety, asthma, s/p lumbar fusion in 2006 and multilevel ACDF in 2010 presenting with UE weakness and numbness as well neck and arm pain found to have severe multilevel cervical stenosis admitted to [facility] on 1/11/19 s/p C2-T2 posterior decompression and fusion on 1/11/19 being admitted here for rehab with Gait Instability, ADL impairments and Functional impairments.    COMORBIDITES/ACTIVE MEDICAL ISSUES     Gait Instability, ADL impairments and Functional impairments:   -Continue with Comprehensive Rehab Program of PT/OT     Cervical stenosis: s/p C2-T2 posterior decompression and fusion on 1/11/19 at [facility]  -Continue with comprehensive rehab program  -Follow up with surgeon as outpatient  -Pain control: tylenol prn, oxycodone prn, pregabalin, methocarbomol prn muscle spasm, lidoderm patch  -Maintain cervical collar    Febrile: Afebrile overnight. Leukocytosis improving to 12.9 <-13.9. UA negative. Blood cx NGTD  -Monitor vitals  -Continue with incentive spirometry    Anemia: Hb stable  -Monitor labs    HLD:  -Continue with statin     Asthma:  -Continue with Symbicort, Spiriva  -Continue with montelukast    Hypothyroidism;  -Continue with synthroid    Reflux:  -Continue with protonix    Anxiety: Per pt, on diazepam 5mg prn up to 4 times a days. Clarified with OSH and reported that pt received diazepam 5mg prn up to 4 times a day without negative side effects  -Continue with diazepam 5mg prn  -Will clarify dose with PCP (Dr. Wil Ross)    DC planning  -Schedule appt with Dr. Patrick Turner ( [phone])    Pain Mgmt - Tylenol PRN, oxycodone prn, pregabalin, methocarbomol prn muscle spasm  GI/Bowel Mgmt -  Continue with Colace, Senna. Monitor output  /Bladder Mgmt - Monitor output    FEN   - Diet - Regular + Thins  [ DASH/TLC]      Precautions / PROPHYLAXIS:   - Falls,  Spinal,   - ortho: Weight bearing status: WBAT   - Lungs: Aspiration, Incentive Spirometer   - Pressure injury/Skin: Turn Q2hrs while in bed, OOB to Chair, PT/OT    - DVT: Lovenox, SCDs, TEDs

## 2019-01-22 NOTE — PROGRESS NOTE ADULT - SUBJECTIVE AND OBJECTIVE BOX
HPI:  63F with PMH of HLD, hypothyroidism, GERD, depression/anxiety, asthma, s/p lumbar fusion in 2006 and multilevel ACDF in 2010 presenting with UE weakness and numbness as well neck and arm pain found to have severe multilevel cervical stenosis admitted to [facility] on 1/11/19 s/p C2-T2 posterior decompression and fusion on 1/11/19 being admitted here for rehab. Pt developed UE weakness and numbness as well as neck and arm pain. She was found to have severe multilevel cervical stenosis despite previous ACDF. She presented to [facility] on 1/11/19 for elective C2-T2 posterior decompression with laminectomy, facetectomy, foraminectomy from C2 to T1. She was managed post op for pain. Her hospital course was complicated by acute onset right thumb numbness. CT cervical/thoracic spine was unremarkable. On POD5, she presented with fever. She was pan cultured and started empirically on antibiotics (zpsyn) for possible UTI for 24 hours.  Infections workup (UA, urine culture, blood culture, CXR) were negative. Antibiotics were discontinued and patient remained stable off of antibiotics.   Patient was medically stabilized and admitted here for rehab. (18 Jan 2019 13:33)    Subjective: Pt seen and examined in chair. Pt reports that after therapy, she had a lot of pain at her incision site. Reports that she received prn pain medications, which did not provide relief. However, pt reports that she is feeling better this morning.     REVIEW OF SYMPTOMS  Denies chest pain, SOB, abdominal or urinary sx  +Last BM yesterday  +Pain at incision site, improved this morning    VITALS  Vital Signs Last 24 Hrs  T(C): 36.9 (22 Jan 2019 08:45), Max: 37.3 (21 Jan 2019 20:24)  T(F): 98.5 (22 Jan 2019 08:45), Max: 99.2 (21 Jan 2019 20:24)  HR: 74 (22 Jan 2019 08:45) (74 - 89)  BP: 108/62 (22 Jan 2019 08:45) (108/62 - 135/80)  BP(mean): --  RR: 14 (22 Jan 2019 08:45) (14 - 16)  SpO2: 98% (22 Jan 2019 08:45) (96% - 98%)      PHYSICAL EXAM  Constitutional - NAD, Comfortable  	HEENT - NCAT  	Neck - surgical incision covered with steri-strips, non-TTP, no surrounding erythema, c/d/i  	Chest - CTA bilaterally  	Cardiovascular - RRR, S1S2  	Abdomen - BS+, Soft, NTND  	Extremities - No C/C/E, No calf tenderness   	Neurologic Exam -                 	   Cognitive - Awake, Alert, Oriented to self, place, date, year, situation  	   Communication - Fluent, No dysarthria  	   Cranial Nerves - CN 2-12 grossly intact  	   Motor -   	                  LEFT    UE - ShAB 4/5, EF 4/5, EE 4/5, WE 4/5,  4/5  	                  RIGHT UE - ShAB 4/5, EF 4/5, EE 4/5, WE 4/5,  4/5  	                  LEFT    LE - HF 4/5, KE 5/5, DF 5/5, PF 5/5  	                  RIGHT LE - HF 4/5, KE 5/5, DF 5/5, PF 5/5     	  Skin - surgical incision site    FUNCTIONAL PROGRESS  Gait - Ambulated 40ft with RW with min assist  Transfers - Sit to stand with min assist  Negotiated 4 steps with 2 HR with mod assist  UB dressing with mod assist  LB dressing with mod assit    RECENT LABS                  RADIOLOGY/OTHER RESULTS      MEDICATIONS  (STANDING):  atorvastatin 80 milliGRAM(s) Oral at bedtime  buDESOnide 160 MICROgram(s)/formoterol 4.5 MICROgram(s) Inhaler 2 Puff(s) Inhalation two times a day  docusate sodium 100 milliGRAM(s) Oral two times a day  enoxaparin Injectable 40 milliGRAM(s) SubCutaneous every 24 hours  levothyroxine 50 MICROGram(s) Oral daily  lidocaine   Patch 1 Patch Transdermal daily  montelukast 10 milliGRAM(s) Oral daily  pantoprazole    Tablet 40 milliGRAM(s) Oral before breakfast  polyethylene glycol 3350 17 Gram(s) Oral daily  pregabalin 100 milliGRAM(s) Oral every 8 hours  senna 2 Tablet(s) Oral at bedtime  tiotropium 18 MICROgram(s) Capsule 1 Capsule(s) Inhalation daily    MEDICATIONS  (PRN):  acetaminophen   Tablet .. 650 milliGRAM(s) Oral every 6 hours PRN Temp greater or equal to 38C (100.4F), Mild Pain (1 - 3)  diazepam    Tablet 5 milliGRAM(s) Oral daily PRN Anxiety  guaiFENesin    Syrup 200 milliGRAM(s) Oral at bedtime PRN Cough  methocarbamol 500 milliGRAM(s) Oral every 8 hours PRN Muscle Spasm  oxyCODONE    IR 10 milliGRAM(s) Oral every 4 hours PRN Severe Pain (7 - 10)  oxyCODONE    IR 5 milliGRAM(s) Oral every 4 hours PRN Moderate Pain (4 - 6) HPI:  63F with PMH of HLD, hypothyroidism, GERD, depression/anxiety, asthma, s/p lumbar fusion in 2006 and multilevel ACDF in 2010 presenting with UE weakness and numbness as well neck and arm pain found to have severe multilevel cervical stenosis admitted to [facility] on 1/11/19 s/p C2-T2 posterior decompression and fusion on 1/11/19 being admitted here for rehab. Pt developed UE weakness and numbness as well as neck and arm pain. She was found to have severe multilevel cervical stenosis despite previous ACDF. She presented to [facility] on 1/11/19 for elective C2-T2 posterior decompression with laminectomy, facetectomy, foraminectomy from C2 to T1. She was managed post op for pain. Her hospital course was complicated by acute onset right thumb numbness. CT cervical/thoracic spine was unremarkable. On POD5, she presented with fever. She was pan cultured and started empirically on antibiotics (zpsyn) for possible UTI for 24 hours.  Infections workup (UA, urine culture, blood culture, CXR) were negative. Antibiotics were discontinued and patient remained stable off of antibiotics.   Patient was medically stabilized and admitted here for rehab. (18 Jan 2019 13:33)    Subjective: Pt seen and examined in chair. Pt reports that after therapy yesterday, she had a lot of pain at her incision site. Reports that she received prn pain medications, which did not provide relief. However, pt reports that she is feeling better this morning.     REVIEW OF SYMPTOMS  Denies chest pain, SOB, abdominal or urinary sx  +Last BM yesterday  +Pain at incision site, improved this morning    VITALS  Vital Signs Last 24 Hrs  T(C): 36.9 (22 Jan 2019 08:45), Max: 37.3 (21 Jan 2019 20:24)  T(F): 98.5 (22 Jan 2019 08:45), Max: 99.2 (21 Jan 2019 20:24)  HR: 74 (22 Jan 2019 08:45) (74 - 89)  BP: 108/62 (22 Jan 2019 08:45) (108/62 - 135/80)  BP(mean): --  RR: 14 (22 Jan 2019 08:45) (14 - 16)  SpO2: 98% (22 Jan 2019 08:45) (96% - 98%)      PHYSICAL EXAM  Constitutional - NAD, Comfortable  	HEENT - NCAT  	Neck - surgical incision covered with steri-strips, non-TTP, no surrounding erythema, c/d/i  	Chest - CTA bilaterally  	Cardiovascular - RRR, S1S2  	Abdomen - BS+, Soft, NTND  	Extremities - No C/C/E, No calf tenderness   	Neurologic Exam -                 	   Cognitive - Awake, Alert, Oriented to self, place, date, year, situation  	   Communication - Fluent, No dysarthria  	   Cranial Nerves - CN 2-12 grossly intact  	   Motor -   	                  LEFT    UE - ShAB 4/5, EF 4/5, EE 4/5, WE 4/5,  4/5  	                  RIGHT UE - ShAB 4/5, EF 4/5, EE 4/5, WE 4/5,  4/5  	                  LEFT    LE - HF 4/5, KE 5/5, DF 5/5, PF 5/5  	                  RIGHT LE - HF 4/5, KE 5/5, DF 5/5, PF 5/5     	  Skin - surgical incision site    FUNCTIONAL PROGRESS  Gait - Ambulated 40ft with RW with min assist  Transfers - Sit to stand with min assist  Negotiated 4 steps with 2 HR with mod assist  UB dressing with mod assist  LB dressing with mod assit    RECENT LABS                  RADIOLOGY/OTHER RESULTS      MEDICATIONS  (STANDING):  atorvastatin 80 milliGRAM(s) Oral at bedtime  buDESOnide 160 MICROgram(s)/formoterol 4.5 MICROgram(s) Inhaler 2 Puff(s) Inhalation two times a day  docusate sodium 100 milliGRAM(s) Oral two times a day  enoxaparin Injectable 40 milliGRAM(s) SubCutaneous every 24 hours  levothyroxine 50 MICROGram(s) Oral daily  lidocaine   Patch 1 Patch Transdermal daily  montelukast 10 milliGRAM(s) Oral daily  pantoprazole    Tablet 40 milliGRAM(s) Oral before breakfast  polyethylene glycol 3350 17 Gram(s) Oral daily  pregabalin 100 milliGRAM(s) Oral every 8 hours  senna 2 Tablet(s) Oral at bedtime  tiotropium 18 MICROgram(s) Capsule 1 Capsule(s) Inhalation daily    MEDICATIONS  (PRN):  acetaminophen   Tablet .. 650 milliGRAM(s) Oral every 6 hours PRN Temp greater or equal to 38C (100.4F), Mild Pain (1 - 3)  diazepam    Tablet 5 milliGRAM(s) Oral daily PRN Anxiety  guaiFENesin    Syrup 200 milliGRAM(s) Oral at bedtime PRN Cough  methocarbamol 500 milliGRAM(s) Oral every 8 hours PRN Muscle Spasm  oxyCODONE    IR 10 milliGRAM(s) Oral every 4 hours PRN Severe Pain (7 - 10)  oxyCODONE    IR 5 milliGRAM(s) Oral every 4 hours PRN Moderate Pain (4 - 6)

## 2019-01-23 DIAGNOSIS — M48.02 SPINAL STENOSIS, CERVICAL REGION: ICD-10-CM

## 2019-01-23 PROCEDURE — 99233 SBSQ HOSP IP/OBS HIGH 50: CPT

## 2019-01-23 PROCEDURE — 99232 SBSQ HOSP IP/OBS MODERATE 35: CPT

## 2019-01-23 RX ADMIN — Medication 100 MILLIGRAM(S): at 21:12

## 2019-01-23 RX ADMIN — Medication 100 MILLIGRAM(S): at 18:19

## 2019-01-23 RX ADMIN — Medication 650 MILLIGRAM(S): at 00:36

## 2019-01-23 RX ADMIN — POLYETHYLENE GLYCOL 3350 17 GRAM(S): 17 POWDER, FOR SOLUTION ORAL at 12:16

## 2019-01-23 RX ADMIN — LIDOCAINE 1 PATCH: 4 CREAM TOPICAL at 12:16

## 2019-01-23 RX ADMIN — OXYCODONE HYDROCHLORIDE 10 MILLIGRAM(S): 5 TABLET ORAL at 22:27

## 2019-01-23 RX ADMIN — OXYCODONE HYDROCHLORIDE 10 MILLIGRAM(S): 5 TABLET ORAL at 19:18

## 2019-01-23 RX ADMIN — Medication 650 MILLIGRAM(S): at 22:27

## 2019-01-23 RX ADMIN — OXYCODONE HYDROCHLORIDE 10 MILLIGRAM(S): 5 TABLET ORAL at 23:38

## 2019-01-23 RX ADMIN — TIOTROPIUM BROMIDE 1 CAPSULE(S): 18 CAPSULE ORAL; RESPIRATORY (INHALATION) at 08:10

## 2019-01-23 RX ADMIN — METHOCARBAMOL 500 MILLIGRAM(S): 500 TABLET, FILM COATED ORAL at 18:20

## 2019-01-23 RX ADMIN — BUDESONIDE AND FORMOTEROL FUMARATE DIHYDRATE 2 PUFF(S): 160; 4.5 AEROSOL RESPIRATORY (INHALATION) at 08:09

## 2019-01-23 RX ADMIN — OXYCODONE HYDROCHLORIDE 10 MILLIGRAM(S): 5 TABLET ORAL at 09:52

## 2019-01-23 RX ADMIN — Medication 650 MILLIGRAM(S): at 19:26

## 2019-01-23 RX ADMIN — Medication 5 MILLIGRAM(S): at 19:26

## 2019-01-23 RX ADMIN — Medication 100 MILLIGRAM(S): at 05:24

## 2019-01-23 RX ADMIN — Medication 650 MILLIGRAM(S): at 12:30

## 2019-01-23 RX ADMIN — BUDESONIDE AND FORMOTEROL FUMARATE DIHYDRATE 2 PUFF(S): 160; 4.5 AEROSOL RESPIRATORY (INHALATION) at 20:10

## 2019-01-23 RX ADMIN — OXYCODONE HYDROCHLORIDE 10 MILLIGRAM(S): 5 TABLET ORAL at 10:30

## 2019-01-23 RX ADMIN — PANTOPRAZOLE SODIUM 40 MILLIGRAM(S): 20 TABLET, DELAYED RELEASE ORAL at 06:08

## 2019-01-23 RX ADMIN — MONTELUKAST 10 MILLIGRAM(S): 4 TABLET, CHEWABLE ORAL at 12:16

## 2019-01-23 RX ADMIN — Medication 650 MILLIGRAM(S): at 11:42

## 2019-01-23 RX ADMIN — OXYCODONE HYDROCHLORIDE 10 MILLIGRAM(S): 5 TABLET ORAL at 04:54

## 2019-01-23 RX ADMIN — Medication 50 MICROGRAM(S): at 05:24

## 2019-01-23 RX ADMIN — Medication 100 MILLIGRAM(S): at 13:19

## 2019-01-23 RX ADMIN — METHOCARBAMOL 500 MILLIGRAM(S): 500 TABLET, FILM COATED ORAL at 08:28

## 2019-01-23 RX ADMIN — ATORVASTATIN CALCIUM 80 MILLIGRAM(S): 80 TABLET, FILM COATED ORAL at 21:12

## 2019-01-23 RX ADMIN — SENNA PLUS 2 TABLET(S): 8.6 TABLET ORAL at 21:12

## 2019-01-23 RX ADMIN — OXYCODONE HYDROCHLORIDE 10 MILLIGRAM(S): 5 TABLET ORAL at 06:27

## 2019-01-23 RX ADMIN — ENOXAPARIN SODIUM 40 MILLIGRAM(S): 100 INJECTION SUBCUTANEOUS at 05:24

## 2019-01-23 NOTE — PROGRESS NOTE ADULT - SUBJECTIVE AND OBJECTIVE BOX
HPI:  63F with PMH of HLD, hypothyroidism, GERD, depression/anxiety, asthma, s/p lumbar fusion in 2006 and multilevel ACDF in 2010 presenting with UE weakness and numbness as well neck and arm pain found to have severe multilevel cervical stenosis admitted to [facility] on 1/11/19 s/p C2-T2 posterior decompression and fusion on 1/11/19 being admitted here for rehab. Pt developed UE weakness and numbness as well as neck and arm pain. She was found to have severe multilevel cervical stenosis despite previous ACDF. She presented to [facility] on 1/11/19 for elective C2-T2 posterior decompression with laminectomy, facetectomy, foraminectomy from C2 to T1. She was managed post op for pain. Her hospital course was complicated by acute onset right thumb numbness. CT cervical/thoracic spine was unremarkable. On POD5, she presented with fever. She was pan cultured and started empirically on antibiotics (zpsyn) for possible UTI for 24 hours.  Infections workup (UA, urine culture, blood culture, CXR) were negative. Antibiotics were discontinued and patient remained stable off of antibiotics.   Patient was medically stabilized and admitted here for rehab. (18 Jan 2019 13:33)      PAST MEDICAL & SURGICAL HISTORY:  Depression  Hypothyroidism  Spinal stenosis  GERD (Gastroesophageal Reflux Disease)  Chronic Bronchitis  Asthma: trigger getting a cold. hospitalized multipl  times last 2007 denies intubation  Hyperlipidemia  Osteopenia  Cervical Disc Displacement: current diagnosis  Cervical vertebral fusion  S/P Colonoscopy: 2005  wnl  S/P Foot Surgery: ORIF left foot  S/P Cholecystectomy: open   1989  Kyphosis  Termination of Pregnancy: x3  remote      Subjective:  neck and back pain improved with oxycodone, but headache continues.  Pt. was due for tylenol--spoke with nursing to provide to pt. Slept better last night.  Pain better controlled    REVIEW OF SYMPTOMS  Denies chest pain, SOB, abdominal or urinary sx  +Last BM yesterday  +Pain at incision site, improved this morning      VITALS  Vital Signs Last 24 Hrs  T(C): 36.8 (23 Jan 2019 09:55), Max: 36.9 (22 Jan 2019 21:10)  T(F): 98.3 (23 Jan 2019 09:55), Max: 98.5 (22 Jan 2019 21:10)  HR: 74 (23 Jan 2019 09:55) (69 - 74)  BP: 111/70 (23 Jan 2019 09:55) (111/70 - 115/70)  BP(mean): --  RR: 12 (23 Jan 2019 09:55) (12 - 14)  SpO2: 97% (23 Jan 2019 09:55) (94% - 97%)      PHYSICAL EXAM  Constitutional - NAD, Comfortable  	HEENT - NCAT  	Neck - aspen collar  	Chest - CTA bilaterally  	Cardiovascular - RRR, S1S2  	Abdomen - BS+, Soft, NTND  	Extremities - No C/C/E, No calf tenderness   	Neurologic Exam -                 	   Cognitive - Awake, Alert, Oriented to self, place, date, year, situation  	   Communication - Fluent, No dysarthria  	   Cranial Nerves - CN 2-12 grossly intact  	   Motor - stable  	                  LEFT    UE - ShAB 4/5, EF 4/5, EE 4/5, WE 4/5,  4/5  	                  RIGHT UE - ShAB 4/5, EF 4/5, EE 4/5, WE 4/5,  4/5  	                  LEFT    LE - HF 4/5, KE 5/5, DF 5/5, PF 5/5  	                  RIGHT LE - HF 4/5, KE 5/5, DF 5/5, PF 5/5     	   RECENT LABS                  RADIOLOGY/OTHER RESULTS      MEDICATIONS  (STANDING):  atorvastatin 80 milliGRAM(s) Oral at bedtime  buDESOnide 160 MICROgram(s)/formoterol 4.5 MICROgram(s) Inhaler 2 Puff(s) Inhalation two times a day  docusate sodium 100 milliGRAM(s) Oral two times a day  enoxaparin Injectable 40 milliGRAM(s) SubCutaneous every 24 hours  levothyroxine 50 MICROGram(s) Oral daily  lidocaine   Patch 1 Patch Transdermal daily  montelukast 10 milliGRAM(s) Oral daily  pantoprazole    Tablet 40 milliGRAM(s) Oral before breakfast  polyethylene glycol 3350 17 Gram(s) Oral daily  pregabalin 100 milliGRAM(s) Oral every 8 hours  senna 2 Tablet(s) Oral at bedtime  tiotropium 18 MICROgram(s) Capsule 1 Capsule(s) Inhalation daily    MEDICATIONS  (PRN):  acetaminophen   Tablet .. 650 milliGRAM(s) Oral every 6 hours PRN Temp greater or equal to 38C (100.4F), Mild Pain (1 - 3)  diazepam    Tablet 5 milliGRAM(s) Oral daily PRN Anxiety  guaiFENesin    Syrup 200 milliGRAM(s) Oral at bedtime PRN Cough  methocarbamol 500 milliGRAM(s) Oral every 8 hours PRN Muscle Spasm  oxyCODONE    IR 10 milliGRAM(s) Oral every 4 hours PRN Severe Pain (7 - 10)  oxyCODONE    IR 5 milliGRAM(s) Oral every 4 hours PRN Moderate Pain (4 - 6)

## 2019-01-23 NOTE — PROGRESS NOTE ADULT - SUBJECTIVE AND OBJECTIVE BOX
63y old  Female who presents with a chief complaint of Cervical Spinal Stenosis     c/o pain in the neck 4/10, aching,  non radiating aggravated by movements, relieved little with meds, and rest, no n/v, no sob    Vital Signs Last 24 Hrs  T(C): 36.8 (23 Jan 2019 09:55), Max: 36.9 (22 Jan 2019 21:10)  T(F): 98.3 (23 Jan 2019 09:55), Max: 98.5 (22 Jan 2019 21:10)  HR: 74 (23 Jan 2019 09:55) (69 - 74)  BP: 111/70 (23 Jan 2019 09:55) (111/70 - 115/70)  BP(mean): --  RR: 12 (23 Jan 2019 09:55) (12 - 14)  SpO2: 97% (23 Jan 2019 09:55) (94% - 97%)    nad, debbi,mmm,ncat  supple  neck incision posterialrly clean, neck brace presentr  clear  s1s2  soft tn bs presenr  no pedal edmea  no gross neuro deficits  aao x 3          Culture - Blood (collected 18 Jan 2019 16:55)  Source: .Blood Blood  Preliminary Report (19 Jan 2019 19:01):    No growth to date.    Culture - Blood (collected 18 Jan 2019 16:55)  Source: .Blood Blood  Preliminary Report (19 Jan 2019 19:01):    No growth to date.

## 2019-01-23 NOTE — PROGRESS NOTE ADULT - ASSESSMENT
63F with PMH of HLD, hypothyroidism, GERD, depression/anxiety, asthma, s/p lumbar fusion in 2006 and multilevel ACDF in 2010 presenting with upper extremity weakness and numbness as well neck and arm pain found to have severe multilevel cervical stenosis admitted to [facility] on 1/11/19 s/p C2-T2 posterior decompression and fusion on 1/11/19 -pt/ot/dvt ppx/pain meds,Cervical collar     Asthma: Stable- Symbicort, Spiriva, montelukast    HLD- c/w Atorvastatin     Hypothyroidism-  Levothyroxine    DVT px- Lovenox

## 2019-01-23 NOTE — CHART NOTE - NSCHARTNOTEFT_GEN_A_CORE
Nutrition Follow Up Note  Hospital Course (Per Electronic Medical Record):   Source: Medical Record [X] Patient [X] Family [ ] Nursing Staff [ ]     Diet: DASH-TLC Diet w/ Thin Liquids   Tolerates Diet Well   No Chewing/Swallowing Difficulties   No Recent Nausea, Vomiting, Diarrhea or Constipation   Consumes % of Meals (as Per Documentation) - States Improving PO Intake/Appetite   Educated Patient on Proper Nutrition     Enteral/Parenteral Nutrition: N/A    Current Weight: 198.4lb on 1/18  Obtain New Weight  Obtain Weights Weekly     Pertinent Medications: MEDICATIONS  (STANDING):  atorvastatin 80 milliGRAM(s) Oral at bedtime  buDESOnide 160 MICROgram(s)/formoterol 4.5 MICROgram(s) Inhaler 2 Puff(s) Inhalation two times a day  docusate sodium 100 milliGRAM(s) Oral two times a day  enoxaparin Injectable 40 milliGRAM(s) SubCutaneous every 24 hours  levothyroxine 50 MICROGram(s) Oral daily  lidocaine   Patch 1 Patch Transdermal daily  montelukast 10 milliGRAM(s) Oral daily  pantoprazole    Tablet 40 milliGRAM(s) Oral before breakfast  polyethylene glycol 3350 17 Gram(s) Oral daily  pregabalin 100 milliGRAM(s) Oral every 8 hours  senna 2 Tablet(s) Oral at bedtime  tiotropium 18 MICROgram(s) Capsule 1 Capsule(s) Inhalation daily    MEDICATIONS  (PRN):  acetaminophen   Tablet .. 650 milliGRAM(s) Oral every 6 hours PRN Temp greater or equal to 38C (100.4F), Mild Pain (1 - 3)  diazepam    Tablet 5 milliGRAM(s) Oral daily PRN Anxiety  guaiFENesin    Syrup 200 milliGRAM(s) Oral at bedtime PRN Cough  methocarbamol 500 milliGRAM(s) Oral every 8 hours PRN Muscle Spasm  oxyCODONE    IR 10 milliGRAM(s) Oral every 4 hours PRN Severe Pain (7 - 10)  oxyCODONE    IR 5 milliGRAM(s) Oral every 4 hours PRN Moderate Pain (4 - 6)    Pertinent Labs:   01-19 Alb 2.0 g/dL<L>    Skin: No Pressure Ulcers     Edema: None Noted     Last BM: on 1/21    Estimated Needs:   [X] No Change since Previous Assessment    Previous Nutrition Diagnosis:   Obese    Nutrition Diagnosis is [X] Ongoing - Educated Patient on Proper Nutrition      New Nutrition Diagnosis: [X] Not Applicable    Interventions:   1. Recommend Continue Nutrition Plan of Care   2. Educated Patient on Proper Nutrition     Monitoring & Evaluation:   [X] Weights   [X] PO Intake   [X] Follow Up (Per Protocol)  [X] Tolerance to Diet Prescription   [X] Other: Labs    RD Remains Available.  Aneesh Wray RD

## 2019-01-23 NOTE — PROGRESS NOTE ADULT - ASSESSMENT
ASSESSMENT/PLAN  63F with PMH of HLD, hypothyroidism, GERD, depression/anxiety, asthma, s/p lumbar fusion in 2006 and multilevel ACDF in 2010 presenting with UE weakness and numbness as well neck and arm pain found to have severe multilevel cervical stenosis admitted to [facility] on 1/11/19 s/p C2-T2 posterior decompression and fusion on 1/11/19 being admitted here for rehab with Gait Instability, ADL impairments and Functional impairments.    COMORBIDITES/ACTIVE MEDICAL ISSUES     Gait Instability, ADL impairments and Functional impairments:   -Continue with Comprehensive Rehab Program of PT/OT     Cervical stenosis: s/p C2-T2 posterior decompression and fusion on 1/11/19 at [facility]  -Continue with comprehensive rehab program  -Follow up with surgeon as outpatient  -Pain control: tylenol prn, oxycodone prn, pregabalin, methocarbomol prn muscle spasm, lidoderm patch  -Maintain cervical collar      Anemia: Hb stable  -Monitor labs    HLD:  -Continue with statin     Asthma:  -Continue with Symbicort, Spiriva  -Continue with montelukast    Hypothyroidism;  -Continue with synthroid    Reflux:  -Continue with protonix    Anxiety: Per pt, on diazepam 5mg prn up to 4 times a days. Clarified with OSH and reported that pt received diazepam 5mg prn up to 4 times a day without negative side effects  -Continue with diazepam 5mg prn  - clarify dose with PCP (Dr. Wil Ross)    DC planning  -Schedule appt with Dr. Patrick Turner ( [phone])    Pain Mgmt - Tylenol PRN, oxycodone prn, pregabalin, methocarbomol prn muscle spasm  GI/Bowel Mgmt -  Continue with Colace, Senna. Monitor output  /Bladder Mgmt - Monitor output    FEN   - Diet - Regular + Thins  [ DASH/TLC]      Precautions / PROPHYLAXIS:   - Falls,  Spinal,   - ortho: Weight bearing status: WBAT   - Lungs: Aspiration, Incentive Spirometer   - Pressure injury/Skin: Turn Q2hrs while in bed, OOB to Chair, PT/OT    - DVT: Lovenox, SCDs, TEDs

## 2019-01-24 PROCEDURE — 99233 SBSQ HOSP IP/OBS HIGH 50: CPT

## 2019-01-24 PROCEDURE — 99232 SBSQ HOSP IP/OBS MODERATE 35: CPT

## 2019-01-24 RX ORDER — DOCUSATE SODIUM 100 MG
100 CAPSULE ORAL THREE TIMES A DAY
Qty: 0 | Refills: 0 | Status: DISCONTINUED | OUTPATIENT
Start: 2019-01-24 | End: 2019-02-02

## 2019-01-24 RX ADMIN — LIDOCAINE 1 PATCH: 4 CREAM TOPICAL at 11:55

## 2019-01-24 RX ADMIN — Medication 30 MILLILITER(S): at 00:28

## 2019-01-24 RX ADMIN — METHOCARBAMOL 500 MILLIGRAM(S): 500 TABLET, FILM COATED ORAL at 11:55

## 2019-01-24 RX ADMIN — Medication 100 MILLIGRAM(S): at 13:40

## 2019-01-24 RX ADMIN — ATORVASTATIN CALCIUM 80 MILLIGRAM(S): 80 TABLET, FILM COATED ORAL at 21:10

## 2019-01-24 RX ADMIN — Medication 100 MILLIGRAM(S): at 21:10

## 2019-01-24 RX ADMIN — MONTELUKAST 10 MILLIGRAM(S): 4 TABLET, CHEWABLE ORAL at 11:55

## 2019-01-24 RX ADMIN — ENOXAPARIN SODIUM 40 MILLIGRAM(S): 100 INJECTION SUBCUTANEOUS at 06:28

## 2019-01-24 RX ADMIN — LIDOCAINE 1 PATCH: 4 CREAM TOPICAL at 17:32

## 2019-01-24 RX ADMIN — POLYETHYLENE GLYCOL 3350 17 GRAM(S): 17 POWDER, FOR SOLUTION ORAL at 11:55

## 2019-01-24 RX ADMIN — Medication 100 MILLIGRAM(S): at 06:28

## 2019-01-24 RX ADMIN — OXYCODONE HYDROCHLORIDE 5 MILLIGRAM(S): 5 TABLET ORAL at 18:38

## 2019-01-24 RX ADMIN — OXYCODONE HYDROCHLORIDE 10 MILLIGRAM(S): 5 TABLET ORAL at 10:45

## 2019-01-24 RX ADMIN — OXYCODONE HYDROCHLORIDE 10 MILLIGRAM(S): 5 TABLET ORAL at 11:45

## 2019-01-24 RX ADMIN — OXYCODONE HYDROCHLORIDE 10 MILLIGRAM(S): 5 TABLET ORAL at 22:41

## 2019-01-24 RX ADMIN — TIOTROPIUM BROMIDE 1 CAPSULE(S): 18 CAPSULE ORAL; RESPIRATORY (INHALATION) at 09:26

## 2019-01-24 RX ADMIN — SENNA PLUS 2 TABLET(S): 8.6 TABLET ORAL at 21:10

## 2019-01-24 RX ADMIN — OXYCODONE HYDROCHLORIDE 10 MILLIGRAM(S): 5 TABLET ORAL at 23:45

## 2019-01-24 RX ADMIN — BUDESONIDE AND FORMOTEROL FUMARATE DIHYDRATE 2 PUFF(S): 160; 4.5 AEROSOL RESPIRATORY (INHALATION) at 20:02

## 2019-01-24 RX ADMIN — BUDESONIDE AND FORMOTEROL FUMARATE DIHYDRATE 2 PUFF(S): 160; 4.5 AEROSOL RESPIRATORY (INHALATION) at 08:36

## 2019-01-24 RX ADMIN — Medication 50 MICROGRAM(S): at 06:28

## 2019-01-24 RX ADMIN — PANTOPRAZOLE SODIUM 40 MILLIGRAM(S): 20 TABLET, DELAYED RELEASE ORAL at 06:28

## 2019-01-24 NOTE — PROGRESS NOTE ADULT - ASSESSMENT
ASSESSMENT/PLAN  63F with PMH of HLD, hypothyroidism, GERD, depression/anxiety, asthma, s/p lumbar fusion in 2006 and multilevel ACDF in 2010 presenting with UE weakness and numbness as well neck and arm pain found to have severe multilevel cervical stenosis admitted to [facility] on 1/11/19 s/p C2-T2 posterior decompression and fusion on 1/11/19 being admitted here for rehab with Gait Instability, ADL impairments and Functional impairments.    COMORBIDITES/ACTIVE MEDICAL ISSUES     Gait Instability, ADL impairments and Functional impairments:   -Continue with Comprehensive Rehab Program of PT/OT     Cervical stenosis: s/p C2-T2 posterior decompression and fusion on 1/11/19 at [facility]  -Continue with comprehensive rehab program  -Follow up with surgeon as outpatient  -Pain control: tylenol prn, oxycodone prn, pregabalin, methocarbomol prn muscle spasm, lidoderm patch  -Maintain cervical collar    Anemia: Hb stable  -Monitor labs    HLD:  -Continue with statin     Asthma:  -Continue with Symbicort, Spiriva  -Continue with montelukast    Hypothyroidism;  -Continue with synthroid    Reflux:  -Continue with protonix    Anxiety: Per pt, on diazepam 5mg prn up to 4 times a days. Clarified with OSH and reported that pt received diazepam 5mg prn up to 4 times a day without negative side effects  -Continue with diazepam 5mg qd prn  -Clarified dose with PCP (Dr. Wil Ross) who said patient was on 2mg BID prn    DC planning  -Schedule appt with Dr. Patrick Turner ( [phone])    Pain Mgmt - Tylenol PRN, oxycodone prn, pregabalin, methocarbomol prn muscle spasm  GI/Bowel Mgmt -  Constiaption. Continue with Senna. Monitor output  /Bladder Mgmt - Monitor output    FEN   - Diet - Regular + Thins  [ DASH/TLC]      Precautions / PROPHYLAXIS:   - Falls,  Spinal,   - ortho: Weight bearing status: WBAT   - Lungs: Aspiration, Incentive Spirometer   - Pressure injury/Skin: Turn Q2hrs while in bed, OOB to Chair, PT/OT    - DVT: Lovenox, SCDs, TEDs ASSESSMENT/PLAN  63F with PMH of HLD, hypothyroidism, GERD, depression/anxiety, asthma, s/p lumbar fusion in 2006 and multilevel ACDF in 2010 presenting with UE weakness and numbness as well neck and arm pain found to have severe multilevel cervical stenosis admitted to [facility] on 1/11/19 s/p C2-T2 posterior decompression and fusion on 1/11/19 being admitted here for rehab with Gait Instability, ADL impairments and Functional impairments.    COMORBIDITES/ACTIVE MEDICAL ISSUES     Gait Instability, ADL impairments and Functional impairments:   -Continue with Comprehensive Rehab Program of PT/OT     Cervical stenosis: s/p C2-T2 posterior decompression and fusion on 1/11/19 at [facility]  -Continue with comprehensive rehab program  -Follow up with surgeon as outpatient  -Pain control: tylenol prn, oxycodone prn, pregabalin, methocarbomol prn muscle spasm, lidoderm patch  -Maintain cervical collar    Anemia: Hb stable  -Monitor labs    HLD:  -Continue with statin     Asthma:  -Continue with Symbicort, Spiriva  -Continue with montelukast    Hypothyroidism;  -Continue with synthroid    Reflux:  -Continue with protonix    Anxiety: Per pt, on diazepam 5mg prn up to 4 times a days. Clarified with OSH and reported that pt received diazepam 5mg prn up to 4 times a day without negative side effects  -Continue with diazepam 5mg qd prn  -Clarified dose with PCP (Dr. Wil Ross) who said patient was on 2mg BID prn    DC planning  -Schedule appt with Dr. Patrick Turner ( [phone])    Pain Mgmt - Tylenol PRN, oxycodone prn, pregabalin, methocarbomol prn muscle spasm  GI/Bowel Mgmt -  Constipation. Continue with Senna, Miralax. Increased dose of colace. Added prunes to diet. Monitor output  /Bladder Mgmt - Monitor output    FEN   - Diet - Regular + Thins  [ DASH/TLC]      Precautions / PROPHYLAXIS:   - Falls,  Spinal,   - ortho: Weight bearing status: WBAT   - Lungs: Aspiration, Incentive Spirometer   - Pressure injury/Skin: Turn Q2hrs while in bed, OOB to Chair, PT/OT    - DVT: Lovenox, SCDs, TEDs ASSESSMENT/PLAN  63F with PMH of HLD, hypothyroidism, GERD, depression/anxiety, asthma, s/p lumbar fusion in 2006 and multilevel ACDF in 2010 presenting with UE weakness and numbness as well neck and arm pain found to have severe multilevel cervical stenosis admitted to [facility] on 1/11/19 s/p C2-T2 posterior decompression and fusion on 1/11/19 being admitted here for rehab with Gait Instability, ADL impairments and Functional impairments.    COMORBIDITES/ACTIVE MEDICAL ISSUES     Gait Instability, ADL impairments and Functional impairments:   -Continue with Comprehensive Rehab Program of PT/OT     Cervical stenosis: s/p C2-T2 posterior decompression and fusion on 1/11/19 at [facility]  -Continue with comprehensive rehab program  -Follow up with surgeon as outpatient  -Pain control: tylenol prn, oxycodone prn, pregabalin, methocarbomol prn muscle spasm, lidoderm patch  -Maintain cervical collar    Anemia: Hb stable  -Monitor labs    Febrile: Afebrile overnight. Leukocytosis improving to 12.9 <-13.9. UA negative. Blood cx NGTD final.   -Monitor vitals  -Continue with incentive spirometry    HLD:  -Continue with statin     Asthma:  -Continue with Symbicort, Spiriva  -Continue with montelukast    Hypothyroidism;  -Continue with synthroid    Reflux:  -Continue with protonix    Anxiety: Per pt, on diazepam 5mg prn up to 4 times a days. Clarified with OSH and reported that pt received diazepam 5mg prn up to 4 times a day without negative side effects  -Continue with diazepam 5mg qd prn  -Clarified dose with PCP (Dr. Wil Ross) who said patient was on 2mg BID prn    DC planning  -Schedule appt with Dr. Patrick Turner ( [phone])    Pain Mgmt - Tylenol PRN, oxycodone prn, pregabalin, methocarbomol prn muscle spasm  GI/Bowel Mgmt -  Constipation. Continue with Senna, Miralax. Increased dose of colace. Added prunes to diet. Monitor output  /Bladder Mgmt - Monitor output    FEN   - Diet - Regular + Thins  [ DASH/TLC]      Precautions / PROPHYLAXIS:   - Falls,  Spinal,   - ortho: Weight bearing status: WBAT   - Lungs: Aspiration, Incentive Spirometer   - Pressure injury/Skin: Turn Q2hrs while in bed, OOB to Chair, PT/OT    - DVT: Lovenox, SCDs, Thais ASSESSMENT/PLAN  63F with PMH of HLD, hypothyroidism, GERD, depression/anxiety, asthma, s/p lumbar fusion in 2006 and multilevel ACDF in 2010 presenting with UE weakness and numbness as well neck and arm pain found to have severe multilevel cervical stenosis admitted to [facility] on 1/11/19 s/p C2-T2 posterior decompression and fusion on 1/11/19 being admitted here for rehab with Gait Instability, ADL impairments and Functional impairments.    COMORBIDITES/ACTIVE MEDICAL ISSUES     Gait Instability, ADL impairments and Functional impairments:   -Continue with Comprehensive Rehab Program of PT/OT     Cervical stenosis: s/p C2-T2 posterior decompression and fusion on 1/11/19 at [facility]  -Continue with comprehensive rehab program  -Follow up with surgeon as outpatient  -Pain control: tylenol prn, oxycodone prn, pregabalin, methocarbomol prn muscle spasm, lidoderm patch  -Maintain cervical collar    Anemia: Hb stable  -Monitor labs    Febrile: Afebrile overnight. Leukocytosis improving to 12.9 <-13.9. UA negative. Blood cx NGTD final.   -Monitor vitals  -Continue with incentive spirometry    HLD:  -Continue with statin     Asthma:  -Continue with Symbicort, Spiriva  -Continue with montelukast    Hypothyroidism;  -Continue with synthroid    Reflux:  -Continue with protonix    Anxiety: Per pt, on diazepam 5mg prn up to 4 times a days. Clarified with OSH and reported that pt received diazepam 5mg prn up to 4 times a day without negative side effects  -Continue with diazepam 5mg qd prn  -Clarified dose with PCP (Dr. Wil Ross) who said patient was on 2mg BID prn    DC planning  -Schedule appt with Dr. Patrick Turner ( [phone])    Pain Mgmt - Tylenol PRN, oxycodone prn, pregabalin, methocarbomol prn muscle spasm  GI/Bowel Mgmt -  Constipation. Continue with Senna, Miralax. Increased dose of colace. Added prunes to diet. Monitor output  /Bladder Mgmt - Monitor output    FEN   - Diet - Regular + Thins  [ DASH/TLC]      Precautions / PROPHYLAXIS:   - Falls,  Spinal,   - ortho: Weight bearing status: WBAT   - Lungs: Aspiration, Incentive Spirometer   - Pressure injury/Skin: Turn Q2hrs while in bed, OOB to Chair, PT/OT    - DVT: Lovenox, SCDs, TEDs     Team meeting 1/24/19--making progress.  home with multiple stairs, caregiver for .  Goal for discharge--mod I.  discharge planning 2/1/19

## 2019-01-24 NOTE — PROGRESS NOTE ADULT - SUBJECTIVE AND OBJECTIVE BOX
63y old  Female who presents with a chief complaint of Cervical Spinal Stenosis     seen at the bedside, still c/o neck pain, mildly improved since yesterday, aggravated on movements 3/10, aching, non radiating.  no n/v, no sob  tolerating PT.    Vital Signs Last 24 Hrs  T(C): 36.7 (24 Jan 2019 07:47), Max: 37 (23 Jan 2019 18:21)  T(F): 98 (24 Jan 2019 07:47), Max: 98.6 (23 Jan 2019 18:21)  HR: 72 (24 Jan 2019 07:47) (64 - 88)  BP: 118/75 (24 Jan 2019 07:47) (118/75 - 137/76)  BP(mean): --  RR: 16 (24 Jan 2019 07:47) (16 - 16)  SpO2: 98% (24 Jan 2019 08:36) (95% - 98%)    nad, debbi,mmm,ncat  supple  neck incision posteriorly clean, neck brace present  clear  s1s2  soft tn bs present  no pedal edema  no gross neuro deficits  aao x 3

## 2019-01-24 NOTE — PROGRESS NOTE ADULT - SUBJECTIVE AND OBJECTIVE BOX
HPI:  63F with PMH of HLD, hypothyroidism, GERD, depression/anxiety, asthma, s/p lumbar fusion in 2006 and multilevel ACDF in 2010 presenting with UE weakness and numbness as well neck and arm pain found to have severe multilevel cervical stenosis admitted to [facility] on 1/11/19 s/p C2-T2 posterior decompression and fusion on 1/11/19 being admitted here for rehab. Pt developed UE weakness and numbness as well as neck and arm pain. She was found to have severe multilevel cervical stenosis despite previous ACDF. She presented to [facility] on 1/11/19 for elective C2-T2 posterior decompression with laminectomy, facetectomy, foraminectomy from C2 to T1. She was managed post op for pain. Her hospital course was complicated by acute onset right thumb numbness. CT cervical/thoracic spine was unremarkable. On POD5, she presented with fever. She was pan cultured and started empirically on antibiotics (zpsyn) for possible UTI for 24 hours.  Infections workup (UA, urine culture, blood culture, CXR) were negative. Antibiotics were discontinued and patient remained stable off of antibiotics.   Patient was medically stabilized and admitted here for rehab. (18 Jan 2019 13:33)    Subjective: Pt seen and examined during therapy. Reports that she is doing well. Reports that her neck pain is better this morning. Reports she slept well. Reports that she has not had a BM in two days. Tolerating diet. Denies nausea, vomiting, or abdominal pain.     REVIEW OF SYMPTOMS  Denies chest pain, SOB or urinary sx  +Constipation, last BM two days ago  +Pain at incision site, improved this morning        VITALS  Vital Signs Last 24 Hrs  T(C): 36.7 (24 Jan 2019 07:47), Max: 37 (23 Jan 2019 18:21)  T(F): 98 (24 Jan 2019 07:47), Max: 98.6 (23 Jan 2019 18:21)  HR: 72 (24 Jan 2019 07:47) (64 - 88)  BP: 118/75 (24 Jan 2019 07:47) (118/75 - 137/76)  BP(mean): --  RR: 16 (24 Jan 2019 07:47) (16 - 16)  SpO2: 98% (24 Jan 2019 08:36) (95% - 98%)      PHYSICAL EXAM  Constitutional - NAD, Comfortable  	HEENT - NCAT  	Neck - aspen collar, surgical incision with steri-strips, c/d/i  	Chest - CTA bilaterally  	Cardiovascular - RRR, S1S2  	Abdomen - BS+, Soft, NTND  	Extremities - No C/C/E, No calf tenderness   	Neurologic Exam -                 	   Cognitive - Awake, Alert, Oriented to self, place, date, year, situation  	   Communication - Fluent, No dysarthria  	   Cranial Nerves - CN 2-12 grossly intact  	   Motor - stable  	                  LEFT    UE - ShAB 4/5, EF 4/5, EE 4/5, WE 4/5,  4/5  	                  RIGHT UE - ShAB 4/5, EF 4/5, EE 4/5, WE 4/5,  4/5  	                  LEFT    LE - HF 4/5, KE 5/5, DF 5/5, PF 5/5  	                  RIGHT LE - HF 4/5, KE 5/5, DF 5/5, PF 5/5       FUNCTIONAL PROGRESS  Gait - Ambulated 40ft with RW with min assist  Transfers - Sit to stand with min assist  Negotiated 4 steps with 2 HR with mod assist  UB dressing with mod assist  LB dressing with mod assit    RECENT LABS                  RADIOLOGY/OTHER RESULTS      MEDICATIONS  (STANDING):  atorvastatin 80 milliGRAM(s) Oral at bedtime  buDESOnide 160 MICROgram(s)/formoterol 4.5 MICROgram(s) Inhaler 2 Puff(s) Inhalation two times a day  docusate sodium 100 milliGRAM(s) Oral three times a day  enoxaparin Injectable 40 milliGRAM(s) SubCutaneous every 24 hours  levothyroxine 50 MICROGram(s) Oral daily  lidocaine   Patch 1 Patch Transdermal daily  montelukast 10 milliGRAM(s) Oral daily  pantoprazole    Tablet 40 milliGRAM(s) Oral before breakfast  polyethylene glycol 3350 17 Gram(s) Oral daily  pregabalin 100 milliGRAM(s) Oral every 8 hours  senna 2 Tablet(s) Oral at bedtime  tiotropium 18 MICROgram(s) Capsule 1 Capsule(s) Inhalation daily    MEDICATIONS  (PRN):  acetaminophen   Tablet .. 650 milliGRAM(s) Oral every 6 hours PRN Temp greater or equal to 38C (100.4F), Mild Pain (1 - 3)  diazepam    Tablet 5 milliGRAM(s) Oral daily PRN Anxiety  guaiFENesin    Syrup 200 milliGRAM(s) Oral at bedtime PRN Cough  methocarbamol 500 milliGRAM(s) Oral every 8 hours PRN Muscle Spasm  oxyCODONE    IR 10 milliGRAM(s) Oral every 4 hours PRN Severe Pain (7 - 10)  oxyCODONE    IR 5 milliGRAM(s) Oral every 4 hours PRN Moderate Pain (4 - 6) HPI:  63F with PMH of HLD, hypothyroidism, GERD, depression/anxiety, asthma, s/p lumbar fusion in 2006 and multilevel ACDF in 2010 presenting with UE weakness and numbness as well neck and arm pain found to have severe multilevel cervical stenosis admitted to [facility] on 1/11/19 s/p C2-T2 posterior decompression and fusion on 1/11/19 being admitted here for rehab. Pt developed UE weakness and numbness as well as neck and arm pain. She was found to have severe multilevel cervical stenosis despite previous ACDF. She presented to [facility] on 1/11/19 for elective C2-T2 posterior decompression with laminectomy, facetectomy, foraminectomy from C2 to T1. She was managed post op for pain. Her hospital course was complicated by acute onset right thumb numbness. CT cervical/thoracic spine was unremarkable. On POD5, she presented with fever. She was pan cultured and started empirically on antibiotics (zpsyn) for possible UTI for 24 hours.  Infections workup (UA, urine culture, blood culture, CXR) were negative. Antibiotics were discontinued and patient remained stable off of antibiotics.   Patient was medically stabilized and admitted here for rehab. (18 Jan 2019 13:33)    Subjective: Pt seen and examined during therapy. Reports that she is doing well. Reports that her neck pain is better this morning. Reports she slept well. Reports that she has not had a BM in two days. Tolerating diet. Denies nausea, vomiting, or abdominal pain.     REVIEW OF SYMPTOMS  Denies chest pain, SOB or urinary sx  +Constipation, last BM two days ago  +Pain at incision site, improved this morning      VITALS  Vital Signs Last 24 Hrs  T(C): 36.7 (24 Jan 2019 07:47), Max: 37 (23 Jan 2019 18:21)  T(F): 98 (24 Jan 2019 07:47), Max: 98.6 (23 Jan 2019 18:21)  HR: 72 (24 Jan 2019 07:47) (64 - 88)  BP: 118/75 (24 Jan 2019 07:47) (118/75 - 137/76)  BP(mean): --  RR: 16 (24 Jan 2019 07:47) (16 - 16)  SpO2: 98% (24 Jan 2019 08:36) (95% - 98%)      PHYSICAL EXAM  Constitutional - NAD, Comfortable  	HEENT - NCAT  	Neck - aspen collar, surgical incision with steri-strips, c/d/i  	Chest - CTA bilaterally  	Cardiovascular - RRR, S1S2  	Abdomen - BS+, Soft, NTND  	Extremities - No C/C/E, No calf tenderness   	Neurologic Exam -                 	   Cognitive - Awake, Alert, Oriented to self, place, date, year, situation  	   Communication - Fluent, No dysarthria  	   Cranial Nerves - CN 2-12 grossly intact  	   Motor - stable  	                  LEFT    UE - ShAB 4/5, EF 4/5, EE 4/5, WE 4/5,  4/5  	                  RIGHT UE - ShAB 4/5, EF 4/5, EE 4/5, WE 4/5,  4/5  	                  LEFT    LE - HF 4/5, KE 5/5, DF 5/5, PF 5/5  	                  RIGHT LE - HF 4/5, KE 5/5, DF 5/5, PF 5/5       FUNCTIONAL PROGRESS  Gait - Ambulated 40ft with RW with min assist  Transfers - Sit to stand with min assist  Negotiated 4 steps with 2 HR with mod assist  UB dressing with mod assist  LB dressing with mod assit    RECENT LABS                  RADIOLOGY/OTHER RESULTS      MEDICATIONS  (STANDING):  atorvastatin 80 milliGRAM(s) Oral at bedtime  buDESOnide 160 MICROgram(s)/formoterol 4.5 MICROgram(s) Inhaler 2 Puff(s) Inhalation two times a day  docusate sodium 100 milliGRAM(s) Oral three times a day  enoxaparin Injectable 40 milliGRAM(s) SubCutaneous every 24 hours  levothyroxine 50 MICROGram(s) Oral daily  lidocaine   Patch 1 Patch Transdermal daily  montelukast 10 milliGRAM(s) Oral daily  pantoprazole    Tablet 40 milliGRAM(s) Oral before breakfast  polyethylene glycol 3350 17 Gram(s) Oral daily  pregabalin 100 milliGRAM(s) Oral every 8 hours  senna 2 Tablet(s) Oral at bedtime  tiotropium 18 MICROgram(s) Capsule 1 Capsule(s) Inhalation daily    MEDICATIONS  (PRN):  acetaminophen   Tablet .. 650 milliGRAM(s) Oral every 6 hours PRN Temp greater or equal to 38C (100.4F), Mild Pain (1 - 3)  diazepam    Tablet 5 milliGRAM(s) Oral daily PRN Anxiety  guaiFENesin    Syrup 200 milliGRAM(s) Oral at bedtime PRN Cough  methocarbamol 500 milliGRAM(s) Oral every 8 hours PRN Muscle Spasm  oxyCODONE    IR 10 milliGRAM(s) Oral every 4 hours PRN Severe Pain (7 - 10)  oxyCODONE    IR 5 milliGRAM(s) Oral every 4 hours PRN Moderate Pain (4 - 6)

## 2019-01-24 NOTE — PROGRESS NOTE ADULT - ASSESSMENT
63F with PMH of HLD, hypothyroidism, GERD, depression/anxiety, asthma, s/p lumbar fusion in 2006 and multilevel ACDF in 2010 presenting with upper extremity weakness and numbness as well neck and arm pain found to have severe multilevel cervical stenosis admitted to [facility] on 1/11/19 s/p C2-T2 posterior decompression and fusion on 1/11/19 -pt/ot/dvt ppx/pain meds,Cervical collar     Asthma: Stable- Symbicort, Spiriva, montelukast    HLD- c/w Atorvastatin     Hypothyroidism-  Levothyroxine    DVT px- Lovenox

## 2019-01-25 PROCEDURE — 99232 SBSQ HOSP IP/OBS MODERATE 35: CPT

## 2019-01-25 RX ORDER — MAGNESIUM HYDROXIDE 400 MG/1
30 TABLET, CHEWABLE ORAL DAILY
Qty: 0 | Refills: 0 | Status: DISCONTINUED | OUTPATIENT
Start: 2019-01-25 | End: 2019-02-02

## 2019-01-25 RX ORDER — OXYCODONE HYDROCHLORIDE 5 MG/1
10 TABLET ORAL EVERY 4 HOURS
Qty: 0 | Refills: 0 | Status: DISCONTINUED | OUTPATIENT
Start: 2019-01-25 | End: 2019-02-01

## 2019-01-25 RX ORDER — DIAZEPAM 5 MG
5 TABLET ORAL DAILY
Qty: 0 | Refills: 0 | Status: DISCONTINUED | OUTPATIENT
Start: 2019-01-25 | End: 2019-01-28

## 2019-01-25 RX ORDER — OXYCODONE HYDROCHLORIDE 5 MG/1
5 TABLET ORAL EVERY 4 HOURS
Qty: 0 | Refills: 0 | Status: DISCONTINUED | OUTPATIENT
Start: 2019-01-25 | End: 2019-02-01

## 2019-01-25 RX ADMIN — Medication 100 MILLIGRAM(S): at 14:46

## 2019-01-25 RX ADMIN — OXYCODONE HYDROCHLORIDE 10 MILLIGRAM(S): 5 TABLET ORAL at 07:52

## 2019-01-25 RX ADMIN — OXYCODONE HYDROCHLORIDE 10 MILLIGRAM(S): 5 TABLET ORAL at 07:46

## 2019-01-25 RX ADMIN — SENNA PLUS 2 TABLET(S): 8.6 TABLET ORAL at 21:45

## 2019-01-25 RX ADMIN — OXYCODONE HYDROCHLORIDE 10 MILLIGRAM(S): 5 TABLET ORAL at 21:45

## 2019-01-25 RX ADMIN — ENOXAPARIN SODIUM 40 MILLIGRAM(S): 100 INJECTION SUBCUTANEOUS at 06:21

## 2019-01-25 RX ADMIN — Medication 5 MILLIGRAM(S): at 21:48

## 2019-01-25 RX ADMIN — OXYCODONE HYDROCHLORIDE 10 MILLIGRAM(S): 5 TABLET ORAL at 22:30

## 2019-01-25 RX ADMIN — OXYCODONE HYDROCHLORIDE 10 MILLIGRAM(S): 5 TABLET ORAL at 16:08

## 2019-01-25 RX ADMIN — MONTELUKAST 10 MILLIGRAM(S): 4 TABLET, CHEWABLE ORAL at 11:18

## 2019-01-25 RX ADMIN — Medication 100 MILLIGRAM(S): at 06:21

## 2019-01-25 RX ADMIN — Medication 100 MILLIGRAM(S): at 21:45

## 2019-01-25 RX ADMIN — Medication 650 MILLIGRAM(S): at 07:46

## 2019-01-25 RX ADMIN — PANTOPRAZOLE SODIUM 40 MILLIGRAM(S): 20 TABLET, DELAYED RELEASE ORAL at 06:20

## 2019-01-25 RX ADMIN — Medication 650 MILLIGRAM(S): at 07:45

## 2019-01-25 RX ADMIN — ATORVASTATIN CALCIUM 80 MILLIGRAM(S): 80 TABLET, FILM COATED ORAL at 21:44

## 2019-01-25 RX ADMIN — OXYCODONE HYDROCHLORIDE 10 MILLIGRAM(S): 5 TABLET ORAL at 08:02

## 2019-01-25 RX ADMIN — LIDOCAINE 1 PATCH: 4 CREAM TOPICAL at 11:17

## 2019-01-25 RX ADMIN — BUDESONIDE AND FORMOTEROL FUMARATE DIHYDRATE 2 PUFF(S): 160; 4.5 AEROSOL RESPIRATORY (INHALATION) at 21:43

## 2019-01-25 RX ADMIN — Medication 50 MICROGRAM(S): at 06:21

## 2019-01-25 RX ADMIN — OXYCODONE HYDROCHLORIDE 5 MILLIGRAM(S): 5 TABLET ORAL at 07:46

## 2019-01-25 RX ADMIN — BUDESONIDE AND FORMOTEROL FUMARATE DIHYDRATE 2 PUFF(S): 160; 4.5 AEROSOL RESPIRATORY (INHALATION) at 09:00

## 2019-01-25 RX ADMIN — POLYETHYLENE GLYCOL 3350 17 GRAM(S): 17 POWDER, FOR SOLUTION ORAL at 11:18

## 2019-01-25 RX ADMIN — TIOTROPIUM BROMIDE 1 CAPSULE(S): 18 CAPSULE ORAL; RESPIRATORY (INHALATION) at 08:58

## 2019-01-25 RX ADMIN — OXYCODONE HYDROCHLORIDE 10 MILLIGRAM(S): 5 TABLET ORAL at 16:28

## 2019-01-25 NOTE — PROGRESS NOTE ADULT - SUBJECTIVE AND OBJECTIVE BOX
HPI:  63F with PMH of HLD, hypothyroidism, GERD, depression/anxiety, asthma, s/p lumbar fusion in 2006 and multilevel ACDF in 2010 presenting with UE weakness and numbness as well neck and arm pain found to have severe multilevel cervical stenosis admitted to [facility] on 1/11/19 s/p C2-T2 posterior decompression and fusion on 1/11/19 being admitted here for rehab. Pt developed UE weakness and numbness as well as neck and arm pain. She was found to have severe multilevel cervical stenosis despite previous ACDF. She presented to [facility] on 1/11/19 for elective C2-T2 posterior decompression with laminectomy, facetectomy, foraminectomy from C2 to T1. She was managed post op for pain. Her hospital course was complicated by acute onset right thumb numbness. CT cervical/thoracic spine was unremarkable. On POD5, she presented with fever. She was pan cultured and started empirically on antibiotics (zpsyn) for possible UTI for 24 hours.  Infections workup (UA, urine culture, blood culture, CXR) were negative. Antibiotics were discontinued and patient remained stable off of antibiotics.   Patient was medically stabilized and admitted here for rehab. (18 Jan 2019 13:33)    Subjective: Pt seen and examined in chair. No acute events overnight. Pt reports that her pain is improving and she has need to use the prn pain medication less.     REVIEW OF SYMPTOMS  [X] Constitutional WNL     [X] Cardio WNL            [X] Resp WNL           [X] GI WNL                      [X]  WNL                 [X] Heme WNL              [X] Endo WNL                  [X] Skin WNL               [X] MSK WNL            [X] Neuro WNL                  [X] Cognitive WNL        [X] Psych WNL      VITALS  Vital Signs Last 24 Hrs  T(C): 36.6 (25 Jan 2019 08:13), Max: 36.7 (24 Jan 2019 20:19)  T(F): 97.9 (25 Jan 2019 08:13), Max: 98.1 (24 Jan 2019 20:19)  HR: 85 (25 Jan 2019 09:01) (72 - 88)  BP: 114/75 (25 Jan 2019 08:13) (114/75 - 116/93)  BP(mean): --  RR: 14 (25 Jan 2019 08:13) (14 - 14)  SpO2: 93% (25 Jan 2019 09:01) (92% - 97%)      PHYSICAL EXAM  Constitutional - NAD, Comfortable  HEENT - NCAT, EOMI  Chest - CTA bilaterally, No wheeze, No rhonchi, No crackles  Cardiovascular - RRR, S1S2, No murmurs  Abdomen - BS+, Soft, NTND  Extremities - No edema, No calf tenderness   Neurologic Exam -                    Cognitive - Awake, Alert, AAO to self, place, date, year, situation     Communication - Fluent, No dysarthria     Cranial Nerves - CN 2-12 intact     Motor - No focal deficits                    LEFT    UE - ShAB 5/5, EF 5/5, EE 5/5, WE 5/5,  5/5                    RIGHT UE - ShAB 5/5, EF 5/5, EE 5/5, WE 5/5,  5/5                    LEFT    LE - HF 5/5, KE 5/5, DF 5/5, PF 5/5                    RIGHT LE - HF 5/5, KE 5/5, DF 5/5, PF 5/5        Sensory - Intact to LT     Reflexes - DTR Intact, No primitive reflexive     Coordination - FTN intact  Psychiatric - Mood stable, Affect WNL  Skin -   Wounds -    FUNCTIONAL PROGRESS  Gait - EVAL  ADLs - EVAL   Transfers - EVAL  Functional transfer - EVAL    RECENT LABS                  RADIOLOGY/OTHER RESULTS      MEDICATIONS  (STANDING):  atorvastatin 80 milliGRAM(s) Oral at bedtime  buDESOnide 160 MICROgram(s)/formoterol 4.5 MICROgram(s) Inhaler 2 Puff(s) Inhalation two times a day  docusate sodium 100 milliGRAM(s) Oral three times a day  enoxaparin Injectable 40 milliGRAM(s) SubCutaneous every 24 hours  levothyroxine 50 MICROGram(s) Oral daily  lidocaine   Patch 1 Patch Transdermal daily  montelukast 10 milliGRAM(s) Oral daily  pantoprazole    Tablet 40 milliGRAM(s) Oral before breakfast  polyethylene glycol 3350 17 Gram(s) Oral daily  pregabalin 100 milliGRAM(s) Oral every 8 hours  senna 2 Tablet(s) Oral at bedtime  tiotropium 18 MICROgram(s) Capsule 1 Capsule(s) Inhalation daily    MEDICATIONS  (PRN):  acetaminophen   Tablet .. 650 milliGRAM(s) Oral every 6 hours PRN Temp greater or equal to 38C (100.4F), Mild Pain (1 - 3)  bisacodyl Suppository 10 milliGRAM(s) Rectal daily PRN Constipation  diazepam    Tablet 5 milliGRAM(s) Oral daily PRN Anxiety  guaiFENesin    Syrup 200 milliGRAM(s) Oral at bedtime PRN Cough  magnesium hydroxide Suspension 30 milliLiter(s) Oral daily PRN Constipation  methocarbamol 500 milliGRAM(s) Oral every 8 hours PRN Muscle Spasm  oxyCODONE    IR 10 milliGRAM(s) Oral every 4 hours PRN Severe Pain (7 - 10)  oxyCODONE    IR 5 milliGRAM(s) Oral every 4 hours PRN Moderate Pain (4 - 6) HPI:  63F with PMH of HLD, hypothyroidism, GERD, depression/anxiety, asthma, s/p lumbar fusion in 2006 and multilevel ACDF in 2010 presenting with UE weakness and numbness as well neck and arm pain found to have severe multilevel cervical stenosis admitted to [facility] on 1/11/19 s/p C2-T2 posterior decompression and fusion on 1/11/19 being admitted here for rehab. Pt developed UE weakness and numbness as well as neck and arm pain. She was found to have severe multilevel cervical stenosis despite previous ACDF. She presented to [facility] on 1/11/19 for elective C2-T2 posterior decompression with laminectomy, facetectomy, foraminectomy from C2 to T1. She was managed post op for pain. Her hospital course was complicated by acute onset right thumb numbness. CT cervical/thoracic spine was unremarkable. On POD5, she presented with fever. She was pan cultured and started empirically on antibiotics (zpsyn) for possible UTI for 24 hours.  Infections workup (UA, urine culture, blood culture, CXR) were negative. Antibiotics were discontinued and patient remained stable off of antibiotics.   Patient was medically stabilized and admitted here for rehab. (18 Jan 2019 13:33)    Subjective: Pt seen and examined in chair. No acute events overnight. Pt reports that her pain is improving and she has needed to use the prn pain medications less. Reports that she did not have a BM yet, last BM two days ago. Tolerating diet. Denies abdominal pain, nausea or vomiting.     REVIEW OF SYMPTOMS  Denies chest pain, SOB or urinary sx  +Constipation, last BM two days ago  +Pain at incision site, improved this morning    VITALS  Vital Signs Last 24 Hrs  T(C): 36.6 (25 Jan 2019 08:13), Max: 36.7 (24 Jan 2019 20:19)  T(F): 97.9 (25 Jan 2019 08:13), Max: 98.1 (24 Jan 2019 20:19)  HR: 85 (25 Jan 2019 09:01) (72 - 88)  BP: 114/75 (25 Jan 2019 08:13) (114/75 - 116/93)  BP(mean): --  RR: 14 (25 Jan 2019 08:13) (14 - 14)  SpO2: 93% (25 Jan 2019 09:01) (92% - 97%)      PHYSICAL EXAM  Constitutional - NAD, Comfortable  	HEENT - NCAT  	Neck - aspen collar, surgical incision with steri-strips, c/d/i  	Chest - CTA bilaterally  	Cardiovascular - RRR, S1S2  	Abdomen - BS+, Soft, NTND  	Extremities - No C/C/E, No calf tenderness   	Neurologic Exam -                 	   Cognitive - Awake, Alert, Oriented to self, place, date, year, situation  	   Communication - Fluent, No dysarthria  	   Cranial Nerves - CN 2-12 grossly intact  	   Motor - stable  	                  LEFT    UE - ShAB 4/5, EF 4/5, EE 4/5, WE 4/5,  4/5  	                  RIGHT UE - ShAB 4/5, EF 4/5, EE 4/5, WE 4/5,  4/5  	                  LEFT    LE - HF 4/5, KE 5/5, DF 5/5, PF 5/5  	                  RIGHT LE - HF 4/5, KE 5/5, DF 5/5, PF 5/5       FUNCTIONAL PROGRESS  Gait - Ambulated 40ft with RW with min assist  Transfers - Sit to stand with min assist  Negotiated 4 steps with 2 HR with mod assist  UB dressing with mod assist  LB dressing with mod assit    RECENT LABS                  RADIOLOGY/OTHER RESULTS      MEDICATIONS  (STANDING):  atorvastatin 80 milliGRAM(s) Oral at bedtime  buDESOnide 160 MICROgram(s)/formoterol 4.5 MICROgram(s) Inhaler 2 Puff(s) Inhalation two times a day  docusate sodium 100 milliGRAM(s) Oral three times a day  enoxaparin Injectable 40 milliGRAM(s) SubCutaneous every 24 hours  levothyroxine 50 MICROGram(s) Oral daily  lidocaine   Patch 1 Patch Transdermal daily  montelukast 10 milliGRAM(s) Oral daily  pantoprazole    Tablet 40 milliGRAM(s) Oral before breakfast  polyethylene glycol 3350 17 Gram(s) Oral daily  pregabalin 100 milliGRAM(s) Oral every 8 hours  senna 2 Tablet(s) Oral at bedtime  tiotropium 18 MICROgram(s) Capsule 1 Capsule(s) Inhalation daily    MEDICATIONS  (PRN):  acetaminophen   Tablet .. 650 milliGRAM(s) Oral every 6 hours PRN Temp greater or equal to 38C (100.4F), Mild Pain (1 - 3)  bisacodyl Suppository 10 milliGRAM(s) Rectal daily PRN Constipation  diazepam    Tablet 5 milliGRAM(s) Oral daily PRN Anxiety  guaiFENesin    Syrup 200 milliGRAM(s) Oral at bedtime PRN Cough  magnesium hydroxide Suspension 30 milliLiter(s) Oral daily PRN Constipation  methocarbamol 500 milliGRAM(s) Oral every 8 hours PRN Muscle Spasm  oxyCODONE    IR 10 milliGRAM(s) Oral every 4 hours PRN Severe Pain (7 - 10)  oxyCODONE    IR 5 milliGRAM(s) Oral every 4 hours PRN Moderate Pain (4 - 6)

## 2019-01-25 NOTE — PROGRESS NOTE ADULT - ASSESSMENT
ASSESSMENT/PLAN  63F with PMH of HLD, hypothyroidism, GERD, depression/anxiety, asthma, s/p lumbar fusion in 2006 and multilevel ACDF in 2010 presenting with UE weakness and numbness as well neck and arm pain found to have severe multilevel cervical stenosis admitted to [facility] on 1/11/19 s/p C2-T2 posterior decompression and fusion on 1/11/19 being admitted here for rehab with Gait Instability, ADL impairments and Functional impairments.    COMORBIDITES/ACTIVE MEDICAL ISSUES     Gait Instability, ADL impairments and Functional impairments:   -Continue with Comprehensive Rehab Program of PT/OT     Cervical stenosis: s/p C2-T2 posterior decompression and fusion on 1/11/19 at [facility]  -Continue with comprehensive rehab program  -Follow up with surgeon as outpatient  -Pain control: tylenol prn, oxycodone prn, pregabalin, methocarbomol prn muscle spasm, lidoderm patch  -Maintain cervical collar    Anemia: Hb stable  -Monitor labs    Febrile: Afebrile overnight. Leukocytosis improving to 12.9 <-13.9. UA negative. Blood cx NGTD final.   -Monitor vitals  -Continue with incentive spirometry    HLD:  -Continue with statin     Asthma:  -Continue with Symbicort, Spiriva  -Continue with montelukast    Hypothyroidism;  -Continue with synthroid    Reflux:  -Continue with protonix    Anxiety: Per pt, on diazepam 5mg prn up to 4 times a days. Clarified with OSH and reported that pt received diazepam 5mg prn up to 4 times a day without negative side effects  -Continue with diazepam 5mg qd prn  -Clarified dose with PCP (Dr. Wil Ross) who said patient was on 2mg BID prn    DC planning  -Schedule appt with Dr. Patrick Turner ( [phone])    Pain Mgmt - Tylenol PRN, oxycodone prn, pregabalin, methocarbomol prn muscle spasm  GI/Bowel Mgmt -  Constipation. Continue with Senna, Miralax and increased dose of colace. Start prn milk of mag and suppository. Added prunes to diet. Monitor output  /Bladder Mgmt - Monitor output    FEN   - Diet - Regular + Thins  [ DASH/TLC]      Precautions / PROPHYLAXIS:   - Falls,  Spinal,   - ortho: Weight bearing status: WBAT   - Lungs: Aspiration, Incentive Spirometer   - Pressure injury/Skin: Turn Q2hrs while in bed, OOB to Chair, PT/OT    - DVT: Lovenox, SCDs, TEDs     Team meeting 1/24/19--making progress.  home with multiple stairs, caregiver for .  Goal for discharge--mod I.  Discharge planning 2/1/19

## 2019-01-26 PROCEDURE — 99232 SBSQ HOSP IP/OBS MODERATE 35: CPT

## 2019-01-26 RX ADMIN — ENOXAPARIN SODIUM 40 MILLIGRAM(S): 100 INJECTION SUBCUTANEOUS at 05:57

## 2019-01-26 RX ADMIN — OXYCODONE HYDROCHLORIDE 10 MILLIGRAM(S): 5 TABLET ORAL at 06:04

## 2019-01-26 RX ADMIN — PANTOPRAZOLE SODIUM 40 MILLIGRAM(S): 20 TABLET, DELAYED RELEASE ORAL at 06:04

## 2019-01-26 RX ADMIN — Medication 100 MILLIGRAM(S): at 14:22

## 2019-01-26 RX ADMIN — OXYCODONE HYDROCHLORIDE 10 MILLIGRAM(S): 5 TABLET ORAL at 21:06

## 2019-01-26 RX ADMIN — POLYETHYLENE GLYCOL 3350 17 GRAM(S): 17 POWDER, FOR SOLUTION ORAL at 12:27

## 2019-01-26 RX ADMIN — ATORVASTATIN CALCIUM 80 MILLIGRAM(S): 80 TABLET, FILM COATED ORAL at 21:06

## 2019-01-26 RX ADMIN — OXYCODONE HYDROCHLORIDE 10 MILLIGRAM(S): 5 TABLET ORAL at 22:10

## 2019-01-26 RX ADMIN — Medication 100 MILLIGRAM(S): at 06:06

## 2019-01-26 RX ADMIN — OXYCODONE HYDROCHLORIDE 10 MILLIGRAM(S): 5 TABLET ORAL at 07:00

## 2019-01-26 RX ADMIN — MONTELUKAST 10 MILLIGRAM(S): 4 TABLET, CHEWABLE ORAL at 12:27

## 2019-01-26 RX ADMIN — LIDOCAINE 1 PATCH: 4 CREAM TOPICAL at 12:27

## 2019-01-26 RX ADMIN — Medication 100 MILLIGRAM(S): at 05:58

## 2019-01-26 RX ADMIN — LIDOCAINE 1 PATCH: 4 CREAM TOPICAL at 20:55

## 2019-01-26 RX ADMIN — BUDESONIDE AND FORMOTEROL FUMARATE DIHYDRATE 2 PUFF(S): 160; 4.5 AEROSOL RESPIRATORY (INHALATION) at 20:38

## 2019-01-26 RX ADMIN — Medication 100 MILLIGRAM(S): at 21:06

## 2019-01-26 RX ADMIN — Medication 100 MILLIGRAM(S): at 21:07

## 2019-01-26 RX ADMIN — TIOTROPIUM BROMIDE 1 CAPSULE(S): 18 CAPSULE ORAL; RESPIRATORY (INHALATION) at 09:20

## 2019-01-26 RX ADMIN — BUDESONIDE AND FORMOTEROL FUMARATE DIHYDRATE 2 PUFF(S): 160; 4.5 AEROSOL RESPIRATORY (INHALATION) at 09:21

## 2019-01-26 RX ADMIN — SENNA PLUS 2 TABLET(S): 8.6 TABLET ORAL at 21:06

## 2019-01-26 RX ADMIN — Medication 50 MICROGRAM(S): at 05:57

## 2019-01-26 NOTE — PROGRESS NOTE ADULT - ASSESSMENT
63F with PMH of HLD, hypothyroidism, GERD, depression/anxiety, asthma, s/p lumbar fusion in 2006 and multilevel ACDF in 2010 presenting with upper extremity weakness and numbness as well neck and arm pain found to have severe multilevel cervical stenosis admitted to [facility] on 1/11/19 s/p C2-T2 posterior decompression and fusion on 1/11/19.    A/P:    1.  Severe Cervical Stenosis; s/p decompression and fusion  -continue cervical collar, pain mgmt  -PT/OT program  	  2.  Asthma: continue with  Symbicort, Spiriva, Montelukast    3.  HLD- c/w Atorvastatin     4.  Hypothyroidism- on  Levothyroxine

## 2019-01-26 NOTE — PROGRESS NOTE ADULT - SUBJECTIVE AND OBJECTIVE BOX
CC: Gait dysfunction    Subjective: Patient seen dixie valuated at the bedside.   Pain controlled, no chest pain, no nausea, vomitting, no fever, shills.   No acute events overngiht  Has been tolerating rehabilitation program.    acetaminophen   Tablet .. 650 milliGRAM(s) Oral every 6 hours PRN  atorvastatin 80 milliGRAM(s) Oral at bedtime  bisacodyl Suppository 10 milliGRAM(s) Rectal daily PRN  buDESOnide 160 MICROgram(s)/formoterol 4.5 MICROgram(s) Inhaler 2 Puff(s) Inhalation two times a day  diazepam    Tablet 5 milliGRAM(s) Oral daily PRN  docusate sodium 100 milliGRAM(s) Oral three times a day  enoxaparin Injectable 40 milliGRAM(s) SubCutaneous every 24 hours  guaiFENesin    Syrup 200 milliGRAM(s) Oral at bedtime PRN  levothyroxine 50 MICROGram(s) Oral daily  lidocaine   Patch 1 Patch Transdermal daily  magnesium hydroxide Suspension 30 milliLiter(s) Oral daily PRN  methocarbamol 500 milliGRAM(s) Oral every 8 hours PRN  montelukast 10 milliGRAM(s) Oral daily  oxyCODONE    IR 10 milliGRAM(s) Oral every 4 hours PRN  oxyCODONE    IR 5 milliGRAM(s) Oral every 4 hours PRN  pantoprazole    Tablet 40 milliGRAM(s) Oral before breakfast  polyethylene glycol 3350 17 Gram(s) Oral daily  pregabalin 100 milliGRAM(s) Oral every 8 hours  senna 2 Tablet(s) Oral at bedtime  tiotropium 18 MICROgram(s) Capsule 1 Capsule(s) Inhalation daily      T(C): 36.8 (01-26-19 @ 07:44), Max: 36.8 (01-26-19 @ 07:44)  HR: 84 (01-26-19 @ 09:24) (74 - 86)  BP: 121/76 (01-26-19 @ 07:44) (121/76 - 135/74)  RR: 14 (01-26-19 @ 07:44) (14 - 14)  SpO2: 95% (01-26-19 @ 09:24) (94% - 99%)    Exam:  NAD  HEENT: EOMI  Heart: RRR, S1/2  Lungs: + bilateral wheezes  Abd: soft ND  Ext: no calf pain  Neuro: exam unchanged    Impression:  Spinal stenosis of cervical region  Spinal stenosis  Undefined  No pertinent family history in first degree relatives  Family history of asthma  Handoff  MEWS Score  Depression  Hypothyroidism  Spinal stenosis  GERD (Gastroesophageal Reflux Disease)  Chronic Bronchitis  Asthma  Hyperlipidemia  Osteopenia  Osteopenia  Cervical Disc Displacement  Cervical vertebral fusion  S/P Colonoscopy  S/P Foot Surgery  S/P Cholecystectomy  Kyphosis  Termination of Pregnancy      TEENA CORBIN with diagnosis of cervical fusion admitted for comprehensive acute rehabilitation    Plan:  - continue medical management as per primary team  - continue PT/OT/SLP  - DVT prophylaxis  - CVS stable  - Patient is stable to continue current rehabilitation program

## 2019-01-26 NOTE — PROGRESS NOTE ADULT - SUBJECTIVE AND OBJECTIVE BOX
Patient is a 63y old  Female who presents with a chief complaint of Cervical Spinal Stenosis (26 Jan 2019 10:30)  Patient seen and examined at bedside.  Pt. c/o cough, states "but I have Asthma".  No shortness of breath.    ALLERGIES:  No Known Allergies    MEDICATIONS  (STANDING):  atorvastatin 80 milliGRAM(s) Oral at bedtime  buDESOnide 160 MICROgram(s)/formoterol 4.5 MICROgram(s) Inhaler 2 Puff(s) Inhalation two times a day  docusate sodium 100 milliGRAM(s) Oral three times a day  enoxaparin Injectable 40 milliGRAM(s) SubCutaneous every 24 hours  levothyroxine 50 MICROGram(s) Oral daily  lidocaine   Patch 1 Patch Transdermal daily  montelukast 10 milliGRAM(s) Oral daily  pantoprazole    Tablet 40 milliGRAM(s) Oral before breakfast  polyethylene glycol 3350 17 Gram(s) Oral daily  pregabalin 100 milliGRAM(s) Oral every 8 hours  senna 2 Tablet(s) Oral at bedtime  tiotropium 18 MICROgram(s) Capsule 1 Capsule(s) Inhalation daily    MEDICATIONS  (PRN):  acetaminophen   Tablet .. 650 milliGRAM(s) Oral every 6 hours PRN Temp greater or equal to 38C (100.4F), Mild Pain (1 - 3)  bisacodyl Suppository 10 milliGRAM(s) Rectal daily PRN Constipation  diazepam    Tablet 5 milliGRAM(s) Oral daily PRN Anxiety  guaiFENesin    Syrup 200 milliGRAM(s) Oral at bedtime PRN Cough  magnesium hydroxide Suspension 30 milliLiter(s) Oral daily PRN Constipation  methocarbamol 500 milliGRAM(s) Oral every 8 hours PRN Muscle Spasm  oxyCODONE    IR 10 milliGRAM(s) Oral every 4 hours PRN Severe Pain (7 - 10)  oxyCODONE    IR 5 milliGRAM(s) Oral every 4 hours PRN Moderate Pain (4 - 6)    Vital Signs Last 24 Hrs  T(F): 98.2 (26 Jan 2019 07:44), Max: 98.2 (26 Jan 2019 07:44)  HR: 84 (26 Jan 2019 09:24) (74 - 86)  BP: 121/76 (26 Jan 2019 07:44) (121/76 - 135/74)  RR: 14 (26 Jan 2019 07:44) (14 - 14)  SpO2: 95% (26 Jan 2019 09:24) (94% - 99%)  I&O's Summary    PHYSICAL EXAM:  General: NAD, A/O x 3  ENT: MMM  Neck: +cervical collar in place  Lungs: +wheezing b/l  Cardio: RRR, S1/S2, No murmurs  Abdomen: Soft, Nontender, Nondistended; Bowel sounds present  Extremities: No calf tenderness, No pitting edema  Neuro:  nonfocal    LABS:                            CAPILLARY BLOOD GLUCOSE                RADIOLOGY & ADDITIONAL TESTS:    Care Discussed with Consultants/Other Providers:

## 2019-01-27 PROCEDURE — 99232 SBSQ HOSP IP/OBS MODERATE 35: CPT

## 2019-01-27 RX ADMIN — OXYCODONE HYDROCHLORIDE 10 MILLIGRAM(S): 5 TABLET ORAL at 21:11

## 2019-01-27 RX ADMIN — OXYCODONE HYDROCHLORIDE 5 MILLIGRAM(S): 5 TABLET ORAL at 10:18

## 2019-01-27 RX ADMIN — Medication 100 MILLIGRAM(S): at 13:26

## 2019-01-27 RX ADMIN — OXYCODONE HYDROCHLORIDE 5 MILLIGRAM(S): 5 TABLET ORAL at 08:58

## 2019-01-27 RX ADMIN — TIOTROPIUM BROMIDE 1 CAPSULE(S): 18 CAPSULE ORAL; RESPIRATORY (INHALATION) at 09:08

## 2019-01-27 RX ADMIN — ENOXAPARIN SODIUM 40 MILLIGRAM(S): 100 INJECTION SUBCUTANEOUS at 06:29

## 2019-01-27 RX ADMIN — LIDOCAINE 1 PATCH: 4 CREAM TOPICAL at 18:20

## 2019-01-27 RX ADMIN — Medication 50 MICROGRAM(S): at 06:30

## 2019-01-27 RX ADMIN — Medication 200 MILLIGRAM(S): at 17:53

## 2019-01-27 RX ADMIN — PANTOPRAZOLE SODIUM 40 MILLIGRAM(S): 20 TABLET, DELAYED RELEASE ORAL at 06:29

## 2019-01-27 RX ADMIN — BUDESONIDE AND FORMOTEROL FUMARATE DIHYDRATE 2 PUFF(S): 160; 4.5 AEROSOL RESPIRATORY (INHALATION) at 09:08

## 2019-01-27 RX ADMIN — OXYCODONE HYDROCHLORIDE 10 MILLIGRAM(S): 5 TABLET ORAL at 20:46

## 2019-01-27 RX ADMIN — Medication 100 MILLIGRAM(S): at 06:30

## 2019-01-27 RX ADMIN — Medication 650 MILLIGRAM(S): at 13:27

## 2019-01-27 RX ADMIN — BUDESONIDE AND FORMOTEROL FUMARATE DIHYDRATE 2 PUFF(S): 160; 4.5 AEROSOL RESPIRATORY (INHALATION) at 22:06

## 2019-01-27 RX ADMIN — SENNA PLUS 2 TABLET(S): 8.6 TABLET ORAL at 20:46

## 2019-01-27 RX ADMIN — POLYETHYLENE GLYCOL 3350 17 GRAM(S): 17 POWDER, FOR SOLUTION ORAL at 12:08

## 2019-01-27 RX ADMIN — Medication 100 MILLIGRAM(S): at 20:46

## 2019-01-27 RX ADMIN — LIDOCAINE 1 PATCH: 4 CREAM TOPICAL at 01:30

## 2019-01-27 RX ADMIN — MONTELUKAST 10 MILLIGRAM(S): 4 TABLET, CHEWABLE ORAL at 12:08

## 2019-01-27 RX ADMIN — ATORVASTATIN CALCIUM 80 MILLIGRAM(S): 80 TABLET, FILM COATED ORAL at 20:46

## 2019-01-27 RX ADMIN — METHOCARBAMOL 500 MILLIGRAM(S): 500 TABLET, FILM COATED ORAL at 22:49

## 2019-01-27 RX ADMIN — LIDOCAINE 1 PATCH: 4 CREAM TOPICAL at 12:08

## 2019-01-27 RX ADMIN — Medication 650 MILLIGRAM(S): at 14:02

## 2019-01-27 NOTE — PROGRESS NOTE ADULT - SUBJECTIVE AND OBJECTIVE BOX
CC: Gait dysfunction    Subjective: Patient seen dixie valuated at the bedside.   Pain controlled, no chest pain, no nausea, vomitting, no fever, chills.   No acute events overngiht  Has been tolerating rehabilitation program.    acetaminophen   Tablet .. 650 milliGRAM(s) Oral every 6 hours PRN  atorvastatin 80 milliGRAM(s) Oral at bedtime  bisacodyl Suppository 10 milliGRAM(s) Rectal daily PRN  buDESOnide 160 MICROgram(s)/formoterol 4.5 MICROgram(s) Inhaler 2 Puff(s) Inhalation two times a day  diazepam    Tablet 5 milliGRAM(s) Oral daily PRN  docusate sodium 100 milliGRAM(s) Oral three times a day  enoxaparin Injectable 40 milliGRAM(s) SubCutaneous every 24 hours  guaiFENesin    Syrup 200 milliGRAM(s) Oral at bedtime PRN  levothyroxine 50 MICROGram(s) Oral daily  lidocaine   Patch 1 Patch Transdermal daily  magnesium hydroxide Suspension 30 milliLiter(s) Oral daily PRN  methocarbamol 500 milliGRAM(s) Oral every 8 hours PRN  montelukast 10 milliGRAM(s) Oral daily  oxyCODONE    IR 10 milliGRAM(s) Oral every 4 hours PRN  oxyCODONE    IR 5 milliGRAM(s) Oral every 4 hours PRN  pantoprazole    Tablet 40 milliGRAM(s) Oral before breakfast  polyethylene glycol 3350 17 Gram(s) Oral daily  pregabalin 100 milliGRAM(s) Oral every 8 hours  senna 2 Tablet(s) Oral at bedtime  tiotropium 18 MICROgram(s) Capsule 1 Capsule(s) Inhalation daily      ICU Vital Signs Last 24 Hrs  T(C): 36.7 (27 Jan 2019 08:08), Max: 36.8 (26 Jan 2019 21:20)  T(F): 98 (27 Jan 2019 08:08), Max: 98.3 (26 Jan 2019 21:20)  HR: 80 (27 Jan 2019 08:08) (80 - 95)  BP: 131/77 (27 Jan 2019 08:08) (131/77 - 132/74)  BP(mean): --  ABP: --  ABP(mean): --  RR: 14 (27 Jan 2019 08:08) (14 - 14)  SpO2: 99% (27 Jan 2019 08:08) (87% - 99%)    Exam:  NAD  HEENT: EOMI  Heart: RRR, S1/2  Lungs: + bilateral wheezes  Abd: soft ND  Ext: no calf pain  Neuro: exam unchanged    Impression:  Spinal stenosis of cervical region  Spinal stenosis  Undefined  No pertinent family history in first degree relatives  Family history of asthma  Handoff  MEWS Score  Depression  Hypothyroidism  Spinal stenosis  GERD (Gastroesophageal Reflux Disease)  Chronic Bronchitis  Asthma  Hyperlipidemia  Osteopenia  Osteopenia  Cervical Disc Displacement  Cervical vertebral fusion  S/P Colonoscopy  S/P Foot Surgery  S/P Cholecystectomy  Kyphosis  Termination of Pregnancy      TEENAKEV CORBIN with diagnosis of cervical fusion admitted for comprehensive acute rehabilitation    Plan:  - continue medical management as per primary team  - continue PT/OT/SLP  - DVT prophylaxis  - CVS stable  - Patient is stable to continue current rehabilitation program

## 2019-01-28 LAB
ANION GAP SERPL CALC-SCNC: 11 MMOL/L — SIGNIFICANT CHANGE UP (ref 5–17)
BASOPHILS # BLD AUTO: 0.1 K/UL — SIGNIFICANT CHANGE UP (ref 0–0.2)
BASOPHILS NFR BLD AUTO: 1 % — SIGNIFICANT CHANGE UP (ref 0–2)
BUN SERPL-MCNC: 6 MG/DL — LOW (ref 7–23)
CALCIUM SERPL-MCNC: 9.4 MG/DL — SIGNIFICANT CHANGE UP (ref 8.4–10.5)
CHLORIDE SERPL-SCNC: 98 MMOL/L — SIGNIFICANT CHANGE UP (ref 96–108)
CO2 SERPL-SCNC: 27 MMOL/L — SIGNIFICANT CHANGE UP (ref 22–31)
CREAT SERPL-MCNC: 0.68 MG/DL — SIGNIFICANT CHANGE UP (ref 0.5–1.3)
EOSINOPHIL # BLD AUTO: 0.2 K/UL — SIGNIFICANT CHANGE UP (ref 0–0.5)
EOSINOPHIL NFR BLD AUTO: 2.1 % — SIGNIFICANT CHANGE UP (ref 0–6)
GLUCOSE SERPL-MCNC: 110 MG/DL — HIGH (ref 70–99)
HCT VFR BLD CALC: 28.6 % — LOW (ref 34.5–45)
HGB BLD-MCNC: 9.2 G/DL — LOW (ref 11.5–15.5)
LYMPHOCYTES # BLD AUTO: 3.6 K/UL — HIGH (ref 1–3.3)
LYMPHOCYTES # BLD AUTO: 32.8 % — SIGNIFICANT CHANGE UP (ref 13–44)
MCHC RBC-ENTMCNC: 28.7 PG — SIGNIFICANT CHANGE UP (ref 27–34)
MCHC RBC-ENTMCNC: 32.1 GM/DL — SIGNIFICANT CHANGE UP (ref 32–36)
MCV RBC AUTO: 89.5 FL — SIGNIFICANT CHANGE UP (ref 80–100)
MONOCYTES # BLD AUTO: 0.5 K/UL — SIGNIFICANT CHANGE UP (ref 0–0.9)
MONOCYTES NFR BLD AUTO: 4.6 % — SIGNIFICANT CHANGE UP (ref 1–9)
NEUTROPHILS # BLD AUTO: 6.5 K/UL — SIGNIFICANT CHANGE UP (ref 1.8–7.4)
NEUTROPHILS NFR BLD AUTO: 59.4 % — SIGNIFICANT CHANGE UP (ref 43–77)
PLATELET # BLD AUTO: 712 K/UL — HIGH (ref 150–400)
POTASSIUM SERPL-MCNC: 4.2 MMOL/L — SIGNIFICANT CHANGE UP (ref 3.5–5.3)
POTASSIUM SERPL-SCNC: 4.2 MMOL/L — SIGNIFICANT CHANGE UP (ref 3.5–5.3)
RBC # BLD: 3.19 M/UL — LOW (ref 3.8–5.2)
RBC # FLD: 12.7 % — SIGNIFICANT CHANGE UP (ref 10.3–14.5)
SODIUM SERPL-SCNC: 136 MMOL/L — SIGNIFICANT CHANGE UP (ref 135–145)
WBC # BLD: 11 K/UL — HIGH (ref 3.8–10.5)
WBC # FLD AUTO: 11 K/UL — HIGH (ref 3.8–10.5)

## 2019-01-28 PROCEDURE — 90792 PSYCH DIAG EVAL W/MED SRVCS: CPT

## 2019-01-28 PROCEDURE — 99232 SBSQ HOSP IP/OBS MODERATE 35: CPT | Mod: GC

## 2019-01-28 RX ORDER — DIAZEPAM 5 MG
2 TABLET ORAL EVERY 12 HOURS
Qty: 0 | Refills: 0 | Status: DISCONTINUED | OUTPATIENT
Start: 2019-01-28 | End: 2019-02-02

## 2019-01-28 RX ADMIN — OXYCODONE HYDROCHLORIDE 10 MILLIGRAM(S): 5 TABLET ORAL at 04:01

## 2019-01-28 RX ADMIN — Medication 100 MILLIGRAM(S): at 21:39

## 2019-01-28 RX ADMIN — Medication 650 MILLIGRAM(S): at 06:47

## 2019-01-28 RX ADMIN — Medication 50 MICROGRAM(S): at 06:47

## 2019-01-28 RX ADMIN — PANTOPRAZOLE SODIUM 40 MILLIGRAM(S): 20 TABLET, DELAYED RELEASE ORAL at 05:59

## 2019-01-28 RX ADMIN — ATORVASTATIN CALCIUM 80 MILLIGRAM(S): 80 TABLET, FILM COATED ORAL at 21:37

## 2019-01-28 RX ADMIN — TIOTROPIUM BROMIDE 1 CAPSULE(S): 18 CAPSULE ORAL; RESPIRATORY (INHALATION) at 09:00

## 2019-01-28 RX ADMIN — SENNA PLUS 2 TABLET(S): 8.6 TABLET ORAL at 21:38

## 2019-01-28 RX ADMIN — MONTELUKAST 10 MILLIGRAM(S): 4 TABLET, CHEWABLE ORAL at 11:18

## 2019-01-28 RX ADMIN — LIDOCAINE 1 PATCH: 4 CREAM TOPICAL at 00:00

## 2019-01-28 RX ADMIN — LIDOCAINE 1 PATCH: 4 CREAM TOPICAL at 18:37

## 2019-01-28 RX ADMIN — Medication 100 MILLIGRAM(S): at 13:09

## 2019-01-28 RX ADMIN — LIDOCAINE 1 PATCH: 4 CREAM TOPICAL at 11:17

## 2019-01-28 RX ADMIN — Medication 100 MILLIGRAM(S): at 21:38

## 2019-01-28 RX ADMIN — OXYCODONE HYDROCHLORIDE 10 MILLIGRAM(S): 5 TABLET ORAL at 08:29

## 2019-01-28 RX ADMIN — OXYCODONE HYDROCHLORIDE 10 MILLIGRAM(S): 5 TABLET ORAL at 05:49

## 2019-01-28 RX ADMIN — OXYCODONE HYDROCHLORIDE 5 MILLIGRAM(S): 5 TABLET ORAL at 22:35

## 2019-01-28 RX ADMIN — BUDESONIDE AND FORMOTEROL FUMARATE DIHYDRATE 2 PUFF(S): 160; 4.5 AEROSOL RESPIRATORY (INHALATION) at 09:02

## 2019-01-28 RX ADMIN — BUDESONIDE AND FORMOTEROL FUMARATE DIHYDRATE 2 PUFF(S): 160; 4.5 AEROSOL RESPIRATORY (INHALATION) at 21:18

## 2019-01-28 RX ADMIN — METHOCARBAMOL 500 MILLIGRAM(S): 500 TABLET, FILM COATED ORAL at 05:59

## 2019-01-28 RX ADMIN — Medication 100 MILLIGRAM(S): at 06:00

## 2019-01-28 RX ADMIN — Medication 650 MILLIGRAM(S): at 05:58

## 2019-01-28 RX ADMIN — LIDOCAINE 1 PATCH: 4 CREAM TOPICAL at 21:30

## 2019-01-28 RX ADMIN — OXYCODONE HYDROCHLORIDE 10 MILLIGRAM(S): 5 TABLET ORAL at 09:00

## 2019-01-28 RX ADMIN — Medication 100 MILLIGRAM(S): at 05:59

## 2019-01-28 RX ADMIN — ENOXAPARIN SODIUM 40 MILLIGRAM(S): 100 INJECTION SUBCUTANEOUS at 05:58

## 2019-01-28 RX ADMIN — Medication 5 MILLIGRAM(S): at 00:29

## 2019-01-28 RX ADMIN — OXYCODONE HYDROCHLORIDE 5 MILLIGRAM(S): 5 TABLET ORAL at 21:38

## 2019-01-28 NOTE — PROGRESS NOTE ADULT - SUBJECTIVE AND OBJECTIVE BOX
HPI:  63F with PMH of HLD, hypothyroidism, GERD, depression/anxiety, asthma, s/p lumbar fusion in 2006 and multilevel ACDF in 2010 presenting with UE weakness and numbness as well neck and arm pain found to have severe multilevel cervical stenosis admitted to [facility] on 1/11/19 s/p C2-T2 posterior decompression and fusion on 1/11/19 being admitted here for rehab. Pt developed UE weakness and numbness as well as neck and arm pain. She was found to have severe multilevel cervical stenosis despite previous ACDF. She presented to [facility] on 1/11/19 for elective C2-T2 posterior decompression with laminectomy, facetectomy, foraminectomy from C2 to T1. She was managed post op for pain. Her hospital course was complicated by acute onset right thumb numbness. CT cervical/thoracic spine was unremarkable. On POD5, she presented with fever. She was pan cultured and started empirically on antibiotics (zpsyn) for possible UTI for 24 hours. Infections workup (UA, urine culture, blood culture, CXR) were negative. Antibiotics were discontinued and patient remained stable off of antibiotics.   Patient was medically stabilized and admitted here for rehab. (18 Jan 2019 13:33)    Subjective: Pt seen and examined in chair. Reports neck pain overnight that interfered with sleep, but much better overall since surgery. Coordination in hands improving slowly     REVIEW OF SYMPTOMS  Denies HA/Chest pain, SOB/Palpitations  Denies abdominal pain/nausea  Denies dysuria or incontinence    Vital Signs Last 24 Hrs  T(C): 36.6 (28 Jan 2019 08:23), Max: 36.8 (27 Jan 2019 20:46)  T(F): 97.9 (28 Jan 2019 08:23), Max: 98.2 (27 Jan 2019 20:46)  HR: 76 (28 Jan 2019 09:02) (70 - 81)  BP: 102/69 (28 Jan 2019 08:23) (102/69 - 128/76)  BP(mean): --  RR: 14 (28 Jan 2019 08:23) (14 - 14)  SpO2: 95% (28 Jan 2019 09:02) (95% - 99%)    PHYSICAL EXAM  Constitutional - NAD, Comfortable  HEENT - NCAT  Neck - aspen collar, surgical incision with steri-strips, c/d/i  Chest - CTA bilaterally  Cardiovascular - RRR, S1S2  Abdomen - BS+, Soft, NTND  Extremities - No C/C/E, No calf tenderness   Motor - stable  LEFT UE - ShAB 4/5, EF 4/5, EE 4/5, WE 4/5,  4/5  RIGHT UE - ShAB 4/5, EF 4/5, EE 4/5, WE 4/5,  4/5  LEFT LE - HF 4/5, KE 5/5, DF 5/5, PF 5/5  RIGHT LE - HF 4/5, KE 5/5, DF 5/5, PF 5/5     FUNCTIONAL PROGRESS  Gait - Ambulated 100ft with RW with CG assist  Transfers - Sit to stand with supervision  Negotiated 4 steps with 2 HR with min assist  Shower transfer: min assist     RECENT LABS  9.2   11.0 )-----------( 712 ( 28 Jan 2019 05:50 )  28.6   01-28  136 | 98 | 6<L>  ----------------------------< 110<H>  4.2 | 27 | 0.68  Ca 9.4 28 Jan 2019 05:50  RADIOLOGY/OTHER RESULTS  MEDICATIONS (STANDING):  atorvastatin 80 milliGRAM(s) Oral at bedtime  buDESOnide 160 MICROgram(s)/formoterol 4.5 MICROgram(s) Inhaler 2 Puff(s) Inhalation two times a day  docusate sodium 100 milliGRAM(s) Oral three times a day  enoxaparin Injectable 40 milliGRAM(s) SubCutaneous every 24 hours  levothyroxine 50 MICROGram(s) Oral daily  lidocaine Patch 1 Patch Transdermal daily  montelukast 10 milliGRAM(s) Oral daily  pantoprazole Tablet 40 milliGRAM(s) Oral before breakfast  polyethylene glycol 3350 17 Gram(s) Oral daily  pregabalin 100 milliGRAM(s) Oral every 8 hours  senna 2 Tablet(s) Oral at bedtime  tiotropium 18 MICROgram(s) Capsule 1 Capsule(s) Inhalation daily  MEDICATIONS (PRN):  acetaminophen Tablet .. 650 milliGRAM(s) Oral every 6 hours PRN Temp greater or equal to 38C (100.4F), Mild Pain (1 - 3)  bisacodyl Suppository 10 milliGRAM(s) Rectal daily PRN Constipation  diazepam Tablet 5 milliGRAM(s) Oral daily PRN Anxiety  guaiFENesin Syrup 200 milliGRAM(s) Oral at bedtime PRN Cough  magnesium hydroxide Suspension 30 milliLiter(s) Oral daily PRN Constipation  methocarbamol 500 milliGRAM(s) Oral every 8 hours PRN Muscle Spasm  oxyCODONE IR 10 milliGRAM(s) Oral every 4 hours PRN Severe Pain (7 - 10)  oxyCODONE IR 5 milliGRAM(s) Oral every 4 hours PRN Moderate Pain (4 - 6)

## 2019-01-28 NOTE — PROGRESS NOTE ADULT - ASSESSMENT
ASSESSMENT/PLAN:  63F with PMH of HLD, hypothyroidism, GERD, depression/anxiety, asthma, s/p lumbar fusion in 2006 and multilevel ACDF in 2010 presenting with UE weakness and numbness as well neck and arm pain found to have severe multilevel cervical stenosis admitted to [facility] on 1/11/19 s/p C2-T2 posterior decompression and fusion on 1/11/19 being admitted here for rehab with Gait Instability, ADL impairments and Functional impairments.    COMORBIDITES/ACTIVE MEDICAL ISSUES     Continue with Comprehensive Rehab Program of PT/OT     Cervical stenosis: s/p C2-T2 posterior decompression and fusion on 1/11/19 at [facility]  -Continue with comprehensive rehab program  -Follow up with surgeon as outpatient  -Pain control: tylenol prn, oxycodone prn, pregabalin, methocarbomol prn muscle spasm, lidoderm patch  -Maintain cervical collar    Anemia: Hb stable  -Monitor labs    Febrile: Afebrile. UA negative. Blood cx NGTD final. WBC trending down     HLD:-Continue with statin     Asthma:-Continue with Symbicort, Spiriva, singulair    Hypothyroidism:-Continue with synthroid    Reflux:-Continue with protonix    Anxiety: Per pt, on diazepam 5mg prn up to 4 times a days. Clarified with OSH and reported that pt received diazepam 5mg prn up to 4 times a day without negative side effects  -Continue with diazepam 5mg qd prn  -Clarified dose with PCP (Dr. Wil Ross) who said patient was on 2mg BID prn  -Psych consult requested here    Pain Mgmt - Tylenol PRN, oxycodone prn, pregabalin, methocarbomol prn muscle spasm  GI/Bowel Mgmt - Constipation. Continue with Senna, Miralax, colace. milk of mag prn and suppository. Added prunes to diet  /Bladder Mgmt -toilet schedule prn     FEN :- Diet - Regular + Thins [ DASH/TLC]     Precautions / PROPHYLAXIS:   - Falls, Spinal,   - ortho: Weight bearing status: WBAT   - Lungs: Aspiration, Incentive Spirometer   - Pressure injury/Skin: Turn Q2hrs while in bed, OOB to Chair, PT/OT   - DVT: Lovenox, SCDs, TEDs     Team meeting 1/24/19--making progress. Goal for discharge--mod I. Discharge planning 2/1/19  DC planning:-Schedule appt with Dr. Patrick Turner ( [phone])

## 2019-01-28 NOTE — CONSULT NOTE ADULT - SUBJECTIVE AND OBJECTIVE BOX
Source: Patient   Reliability: Reliable    Identifying Data: 62yo Female with HEALTH ISSUES - PROBLEM Dx:    Chief Complaint: "I'm feeling a little down."    History of Present Illness:  63F with PMH of HLD, hypothyroidism, GERD, depression/anxiety, asthma, s/p lumbar fusion in 2006 and multilevel ACDF in 2010 presenting with UE weakness and numbness as well neck and arm pain found to have severe multilevel cervical stenosis admitted to [facility] on 1/11/19 s/p C2-T2 posterior decompression and fusion on 1/11/19 being admitted here for rehab. Pt developed UE weakness and numbness as well as neck and arm pain. She was found to have severe multilevel cervical stenosis despite previous ACDF. She presented to [facility] on 1/11/19 for elective C2-T2 posterior decompression with laminectomy, facetectomy, foraminectomy from C2 to T1. She was managed post op for pain. Her hospital course was complicated by acute onset right thumb numbness. CT cervical/thoracic spine was unremarkable. On POD5, she presented with fever. She was pan cultured and started empirically on antibiotics (zpsyn) for possible UTI for 24 hours.  Infections workup (UA, urine culture, blood culture, CXR) were negative. Antibiotics were discontinued and patient remained stable off of antibiotics.   Patient was medically stabilized and admitted here for rehab. (18 Jan 2019 13:33)    Symptoms and concerns-met with patient at the bedside who presents sleepy presently, having finished therapies for the day. Reports to having "worries" since coming to the hospital, concerned for her future health and how she will manage post hospitalization.  Reports to have support from her family, especially her daughter with whom she relies on.  Reports she is able to rest after taking pain medication as it makes her feel calm.  Reports to have taken medication for depression and anxiousness in the past, being prescribed by her primary MD,  but can't recall medication name presently.           Psychiatric Review of Systems:  Mood- dysphoric  Anxiety-including worries    MEDICATIONS  (STANDING):  atorvastatin 80 milliGRAM(s) Oral at bedtime  buDESOnide 160 MICROgram(s)/formoterol 4.5 MICROgram(s) Inhaler 2 Puff(s) Inhalation two times a day  docusate sodium 100 milliGRAM(s) Oral three times a day  enoxaparin Injectable 40 milliGRAM(s) SubCutaneous every 24 hours  levothyroxine 50 MICROGram(s) Oral daily  lidocaine   Patch 1 Patch Transdermal daily  montelukast 10 milliGRAM(s) Oral daily  pantoprazole    Tablet 40 milliGRAM(s) Oral before breakfast  polyethylene glycol 3350 17 Gram(s) Oral daily  pregabalin 100 milliGRAM(s) Oral every 8 hours  senna 2 Tablet(s) Oral at bedtime  tiotropium 18 MICROgram(s) Capsule 1 Capsule(s) Inhalation daily    MEDICATIONS  (PRN):  acetaminophen   Tablet .. 650 milliGRAM(s) Oral every 6 hours PRN Temp greater or equal to 38C (100.4F), Mild Pain (1 - 3)  bisacodyl Suppository 10 milliGRAM(s) Rectal daily PRN Constipation  diazepam    Tablet 5 milliGRAM(s) Oral daily PRN Anxiety  guaiFENesin    Syrup 200 milliGRAM(s) Oral at bedtime PRN Cough  magnesium hydroxide Suspension 30 milliLiter(s) Oral daily PRN Constipation  methocarbamol 500 milliGRAM(s) Oral every 8 hours PRN Muscle Spasm  oxyCODONE    IR 10 milliGRAM(s) Oral every 4 hours PRN Severe Pain (7 - 10)  oxyCODONE    IR 5 milliGRAM(s) Oral every 4 hours PRN Moderate Pain (4 - 6)    Allergies  No Known Allergies    PAST MEDICAL & SURGICAL HISTORY:  Depression  Hypothyroidism  Spinal stenosis  GERD (Gastroesophageal Reflux Disease)  Chronic Bronchitis  Asthma: trigger getting a cold. hospitalized multiple  times last 2007 denies intubation  Hyperlipidemia  Osteopenia  Cervical Disc Displacement: current diagnosis  Cervical vertebral fusion  S/P Colonoscopy: 2005  wnl  S/P Foot Surgery: ORIF left foot  S/P Cholecystectomy: open   1989  Kyphosis  Termination of Pregnancy: x3  remote    Past Psychiatric History:  Reports to have been prescribed anti depressant in past, unable to recall medication.    Denies inpatient psychiatric hospitalization.  Suicidality/ Aggression-denies    FAMILY HISTORY:  No pertinent family history in first degree relatives    T(C): 36.6 (01-28-19 @ 08:23), Max: 36.8 (01-27-19 @ 20:46)  HR: 76 (01-28-19 @ 09:02) (70 - 81)  BP: 102/69 (01-28-19 @ 08:23) (102/69 - 128/76)  RR: 14 (01-28-19 @ 08:23) (14 - 14)  SpO2: 95% (01-28-19 @ 09:02) (95% - 99%)  Wt(kg): --    Mental Status Examination:  General Appearance-Overweight female laying supine in bed with shawl drapped around head.  -hygiene/grooming-good  Behavior-calm  Attitude/ Hmhfdflvwaapl-fk-eyfgcxsth  -eye contact-good  Speech-normal rate, volume and pitch  Thought Process-linear  Thought Content-focused on disability, worried for medical problem's effect on the future.   -perceptual disturbance-denies  -delusions: denies  -bizarre/referential/paranoid ideation-denies  -self-harm or aggressive thoughts-denies  Mood-dysphoric, mildly anxious  Affect-congruent  Cognition-oriented person, place  Impulse Control-fair  Insight/Judgment-fair    Studies:    Laboratory testing-                        9.2    11.0  )-----------( 712      ( 28 Jan 2019 05:50 )             28.6     01-28    136  |  98  |  6<L>  ----------------------------<  110<H>  4.2   |  27  |  0.68    Ca    9.4      28 Jan 2019 05:50    Assessment:  Adjustment Disorder with anxious & depressive features.     Work-up- call placed to patient's PCP for additional information regarding psychotropic medication history.        Continue Pregabalin 100mg po Q8H.       Other Treatment considerations-would encourage verbalization of feelings with re-assurance provided by family & staff.    Hospital course Follow-up  Will continue to follow with you. Source: Patient   Reliability: Reliable    Identifying Data: 64yo Female with HEALTH ISSUES - PROBLEM Dx:    Chief Complaint: "I'm feeling a little down."    History of Present Illness:  63F with PMH of HLD, hypothyroidism, GERD, depression/anxiety, asthma, s/p lumbar fusion in 2006 and multilevel ACDF in 2010 presenting with UE weakness and numbness as well neck and arm pain found to have severe multilevel cervical stenosis admitted to [facility] on 1/11/19 s/p C2-T2 posterior decompression and fusion on 1/11/19 being admitted here for rehab. Pt developed UE weakness and numbness as well as neck and arm pain. She was found to have severe multilevel cervical stenosis despite previous ACDF. She presented to [facility] on 1/11/19 for elective C2-T2 posterior decompression with laminectomy, facetectomy, foraminectomy from C2 to T1. She was managed post op for pain. Her hospital course was complicated by acute onset right thumb numbness. CT cervical/thoracic spine was unremarkable. On POD5, she presented with fever. She was pan cultured and started empirically on antibiotics (zpsyn) for possible UTI for 24 hours.  Infections workup (UA, urine culture, blood culture, CXR) were negative. Antibiotics were discontinued and patient remained stable off of antibiotics.   Patient was medically stabilized and admitted here for rehab. (18 Jan 2019 13:33)    Symptoms and concerns-met with patient at the bedside who presents sleepy presently, having finished therapies for the day. Reports to having "worries" since coming to the hospital, concerned for her future health and how she will manage post hospitalization.  Reports to have support from her family, especially her daughter with whom she relies on.  Reports she is able to rest after taking pain medication as it makes her feel calm.  Reports to have taken medication for depression and anxiousness in the past, being prescribed by her primary MD,  but can't recall medication name presently.           Psychiatric Review of Systems:  Mood- dysphoric  Anxiety-including worries    MEDICATIONS  (STANDING):  atorvastatin 80 milliGRAM(s) Oral at bedtime  buDESOnide 160 MICROgram(s)/formoterol 4.5 MICROgram(s) Inhaler 2 Puff(s) Inhalation two times a day  docusate sodium 100 milliGRAM(s) Oral three times a day  enoxaparin Injectable 40 milliGRAM(s) SubCutaneous every 24 hours  levothyroxine 50 MICROGram(s) Oral daily  lidocaine   Patch 1 Patch Transdermal daily  montelukast 10 milliGRAM(s) Oral daily  pantoprazole    Tablet 40 milliGRAM(s) Oral before breakfast  polyethylene glycol 3350 17 Gram(s) Oral daily  pregabalin 100 milliGRAM(s) Oral every 8 hours  senna 2 Tablet(s) Oral at bedtime  tiotropium 18 MICROgram(s) Capsule 1 Capsule(s) Inhalation daily    MEDICATIONS  (PRN):  acetaminophen   Tablet .. 650 milliGRAM(s) Oral every 6 hours PRN Temp greater or equal to 38C (100.4F), Mild Pain (1 - 3)  bisacodyl Suppository 10 milliGRAM(s) Rectal daily PRN Constipation  diazepam    Tablet 5 milliGRAM(s) Oral daily PRN Anxiety  guaiFENesin    Syrup 200 milliGRAM(s) Oral at bedtime PRN Cough  magnesium hydroxide Suspension 30 milliLiter(s) Oral daily PRN Constipation  methocarbamol 500 milliGRAM(s) Oral every 8 hours PRN Muscle Spasm  oxyCODONE    IR 10 milliGRAM(s) Oral every 4 hours PRN Severe Pain (7 - 10)  oxyCODONE    IR 5 milliGRAM(s) Oral every 4 hours PRN Moderate Pain (4 - 6)    Allergies  No Known Allergies    PAST MEDICAL & SURGICAL HISTORY:  Depression  Hypothyroidism  Spinal stenosis  GERD (Gastroesophageal Reflux Disease)  Chronic Bronchitis  Asthma: trigger getting a cold. hospitalized multiple  times last 2007 denies intubation  Hyperlipidemia  Osteopenia  Cervical Disc Displacement: current diagnosis  Cervical vertebral fusion  S/P Colonoscopy: 2005  wnl  S/P Foot Surgery: ORIF left foot  S/P Cholecystectomy: open   1989  Kyphosis  Termination of Pregnancy: x3  remote    Past Psychiatric History:  Reports to have been prescribed anti depressant in past, unable to recall medication.    Denies inpatient psychiatric hospitalization.  Suicidality/ Aggression-denies    FAMILY HISTORY:  No pertinent family history in first degree relatives    T(C): 36.6 (01-28-19 @ 08:23), Max: 36.8 (01-27-19 @ 20:46)  HR: 76 (01-28-19 @ 09:02) (70 - 81)  BP: 102/69 (01-28-19 @ 08:23) (102/69 - 128/76)  RR: 14 (01-28-19 @ 08:23) (14 - 14)  SpO2: 95% (01-28-19 @ 09:02) (95% - 99%)  Wt(kg): --    Mental Status Examination:  General Appearance-Overweight female laying supine in bed with shawl drapped around head.  -hygiene/grooming-good  Behavior-calm  Attitude/ Vwvpdicwevvyk-bk-jxgtmdscf  -eye contact-good  Speech-normal rate, volume and pitch  Thought Process-linear  Thought Content-focused on disability, worried for medical problem's effect on the future.   -perceptual disturbance-denies  -delusions: denies  -bizarre/referential/paranoid ideation-denies  -self-harm or aggressive thoughts-denies  Mood-dysphoric, mildly anxious  Affect-congruent  Cognition-oriented person, place  Impulse Control-fair  Insight/Judgment-fair    Studies:    Laboratory testing-                        9.2    11.0  )-----------( 712      ( 28 Jan 2019 05:50 )             28.6     01-28    136  |  98  |  6<L>  ----------------------------<  110<H>  4.2   |  27  |  0.68    Ca    9.4      28 Jan 2019 05:50    Assessment:  Adjustment Disorder with anxious & depressive features.     Work-up- call placed to patient's PCP for additional information regarding psychotropic medication history.        Continue Pregabalin 100mg po Q8H.   Taper Valium 2mg po Q12h prn anxiousness.    Other Treatment considerations-would encourage verbalization of feelings with re-assurance provided by family & staff.    Hospital course Follow-up  Will continue to follow with you.

## 2019-01-29 PROCEDURE — 99232 SBSQ HOSP IP/OBS MODERATE 35: CPT

## 2019-01-29 RX ADMIN — Medication 100 MILLIGRAM(S): at 14:13

## 2019-01-29 RX ADMIN — METHOCARBAMOL 500 MILLIGRAM(S): 500 TABLET, FILM COATED ORAL at 17:56

## 2019-01-29 RX ADMIN — ATORVASTATIN CALCIUM 80 MILLIGRAM(S): 80 TABLET, FILM COATED ORAL at 21:26

## 2019-01-29 RX ADMIN — ENOXAPARIN SODIUM 40 MILLIGRAM(S): 100 INJECTION SUBCUTANEOUS at 05:57

## 2019-01-29 RX ADMIN — MONTELUKAST 10 MILLIGRAM(S): 4 TABLET, CHEWABLE ORAL at 11:44

## 2019-01-29 RX ADMIN — Medication 650 MILLIGRAM(S): at 02:00

## 2019-01-29 RX ADMIN — Medication 100 MILLIGRAM(S): at 05:57

## 2019-01-29 RX ADMIN — OXYCODONE HYDROCHLORIDE 10 MILLIGRAM(S): 5 TABLET ORAL at 11:12

## 2019-01-29 RX ADMIN — OXYCODONE HYDROCHLORIDE 5 MILLIGRAM(S): 5 TABLET ORAL at 17:53

## 2019-01-29 RX ADMIN — OXYCODONE HYDROCHLORIDE 5 MILLIGRAM(S): 5 TABLET ORAL at 16:35

## 2019-01-29 RX ADMIN — TIOTROPIUM BROMIDE 1 CAPSULE(S): 18 CAPSULE ORAL; RESPIRATORY (INHALATION) at 10:41

## 2019-01-29 RX ADMIN — LIDOCAINE 1 PATCH: 4 CREAM TOPICAL at 11:43

## 2019-01-29 RX ADMIN — OXYCODONE HYDROCHLORIDE 10 MILLIGRAM(S): 5 TABLET ORAL at 08:14

## 2019-01-29 RX ADMIN — Medication 50 MICROGRAM(S): at 05:57

## 2019-01-29 RX ADMIN — LIDOCAINE 1 PATCH: 4 CREAM TOPICAL at 21:27

## 2019-01-29 RX ADMIN — PANTOPRAZOLE SODIUM 40 MILLIGRAM(S): 20 TABLET, DELAYED RELEASE ORAL at 05:57

## 2019-01-29 RX ADMIN — BUDESONIDE AND FORMOTEROL FUMARATE DIHYDRATE 2 PUFF(S): 160; 4.5 AEROSOL RESPIRATORY (INHALATION) at 08:22

## 2019-01-29 RX ADMIN — Medication 100 MILLIGRAM(S): at 06:02

## 2019-01-29 RX ADMIN — Medication 650 MILLIGRAM(S): at 18:07

## 2019-01-29 RX ADMIN — Medication 100 MILLIGRAM(S): at 21:26

## 2019-01-29 RX ADMIN — POLYETHYLENE GLYCOL 3350 17 GRAM(S): 17 POWDER, FOR SOLUTION ORAL at 11:44

## 2019-01-29 RX ADMIN — Medication 650 MILLIGRAM(S): at 00:59

## 2019-01-29 RX ADMIN — Medication 650 MILLIGRAM(S): at 18:08

## 2019-01-29 RX ADMIN — SENNA PLUS 2 TABLET(S): 8.6 TABLET ORAL at 21:26

## 2019-01-29 NOTE — PROGRESS NOTE ADULT - ASSESSMENT
63F with PMH of HLD, hypothyroidism, GERD, depression/anxiety, asthma, s/p lumbar fusion in 2006 and multilevel ACDF in 2010 presenting with UE weakness and numbness as well neck and arm pain found to have severe multilevel cervical stenosis admitted to [facility] on 1/11/19 s/p C2-T2 posterior decompression and fusion on 1/11/19      #Cervical stenosis: s/p C2-T2 posterior decompression and fusion  c/w PT/OT   Cervical collar & Pain management     #Asthma: Stable   Mild wheeze, no SOB   c/w  Symbicort, Spiriva  c/w  montelukast    #Leucocytosis   trending down   Afebrile, Stable.     #HLD  c/w Atorvastatin     #Hypothyroidism;  c/w  Levothyroxine    # DVT px

## 2019-01-29 NOTE — PROGRESS NOTE ADULT - SUBJECTIVE AND OBJECTIVE BOX
Patient is a 63y old  Female who presents with a chief complaint of Cervical Spinal Stenosis (28 Jan 2019 18:12)      Patient seen and examined at bedside. No new complaints, no SOB, CP , N/V     ALLERGIES:  No Known Allergies    MEDICATIONS:  atorvastatin 80 milliGRAM(s) Oral at bedtime  bisacodyl Suppository 10 milliGRAM(s) Rectal daily PRN  diazepam    Tablet 2 milliGRAM(s) Oral every 12 hours PRN  docusate sodium 100 milliGRAM(s) Oral three times a day  guaiFENesin    Syrup 200 milliGRAM(s) Oral at bedtime PRN  lidocaine   Patch 1 Patch Transdermal daily  magnesium hydroxide Suspension 30 milliLiter(s) Oral daily PRN  methocarbamol 500 milliGRAM(s) Oral every 8 hours PRN  oxyCODONE    IR 10 milliGRAM(s) Oral every 4 hours PRN  oxyCODONE    IR 5 milliGRAM(s) Oral every 4 hours PRN  pregabalin 100 milliGRAM(s) Oral every 8 hours    Vital Signs Last 24 Hrs  T(F): 98.1 (29 Jan 2019 07:41), Max: 98.1 (29 Jan 2019 07:41)  HR: 70 (29 Jan 2019 07:41) (70 - 86)  BP: 119/77 (29 Jan 2019 07:41) (118/69 - 119/77)  RR: 14 (29 Jan 2019 07:41) (14 - 15)  SpO2: 98% (29 Jan 2019 08:23) (95% - 98%)  I&O's Summary      PHYSICAL EXAM:  General: NAD, comfortable   ENT: MMM  Neck: Supple, No JVD  Lungs: Wheeze diffuse+   Cardio: RRR, S1/S2, No murmurs  Abdomen: Soft, NT/ND, BS+   Extremities: No clubbing, cyanosis, or edema  CNS: nonfocal     LABS:                        9.2    11.0  )-----------( 712      ( 28 Jan 2019 05:50 )             28.6     01-28    136  |  98  |  6   ----------------------------<  110  4.2   |  27  |  0.68    Ca    9.4      28 Jan 2019 05:50      eGFR if Non African American: 92 mL/min/1.73M2 (01-28-19 @ 05:50)  eGFR if : 107 mL/min/1.73M2 (01-28-19 @ 05:50)                    CAPILLARY BLOOD GLUCOSE                RADIOLOGY & ADDITIONAL TESTS:    Care Discussed with Consultants/Other Providers:

## 2019-01-29 NOTE — PROGRESS NOTE ADULT - SUBJECTIVE AND OBJECTIVE BOX
HPI:  63F with PMH of HLD, hypothyroidism, GERD, depression/anxiety, asthma, s/p lumbar fusion in 2006 and multilevel ACDF in 2010 presenting with UE weakness and numbness as well neck and arm pain found to have severe multilevel cervical stenosis admitted to [facility] on 1/11/19 s/p C2-T2 posterior decompression and fusion on 1/11/19 being admitted here for rehab. Pt developed UE weakness and numbness as well as neck and arm pain. She was found to have severe multilevel cervical stenosis despite previous ACDF. She presented to [facility] on 1/11/19 for elective C2-T2 posterior decompression with laminectomy, facetectomy, foraminectomy from C2 to T1. She was managed post op for pain. Her hospital course was complicated by acute onset right thumb numbness. CT cervical/thoracic spine was unremarkable. On POD5, she presented with fever. She was pan cultured and started empirically on antibiotics (zpsyn) for possible UTI for 24 hours. Infections workup (UA, urine culture, blood culture, CXR) were negative. Antibiotics were discontinued and patient remained stable off of antibiotics.   Patient was medically stabilized and admitted here for rehab. (18 Jan 2019 13:33)    Subjective: Pt seen and examined in chair. feels well, but anxious about dc as she has no one to help her at home    REVIEW OF SYMPTOMS  Denies HA/Chest pain, SOB/Palpitations  Denies abdominal pain/nausea  Denies dysuria or incontinence  Vital Signs Last 24 Hrs  T(C): 36.7 (29 Jan 2019 07:41), Max: 36.7 (29 Jan 2019 07:41)  T(F): 98.1 (29 Jan 2019 07:41), Max: 98.1 (29 Jan 2019 07:41)  HR: 70 (29 Jan 2019 07:41) (70 - 86)  BP: 119/77 (29 Jan 2019 07:41) (118/69 - 119/77)  BP(mean): --  RR: 14 (29 Jan 2019 07:41) (14 - 15)  SpO2: 98% (29 Jan 2019 08:23) (95% - 98%)    PHYSICAL EXAM  Constitutional - NAD, Comfortable  HEENT - NCAT  Neck - aspen collar, surgical incision with steri-strips, c/d/i  Chest - CTA bilaterally  Cardiovascular - RRR, S1S2  Abdomen - BS+, Soft, NTND  Extremities - No C/C/E, No calf tenderness   Motor - stable  LEFT UE - ShAB 4/5, EF 4/5, EE 4/5, WE 4/5,  4/5  RIGHT UE - ShAB 4/5, EF 4/5, EE 4/5, WE 4/5,  4/5  LEFT LE - HF 4/5, KE 5/5, DF 5/5, PF 5/5  RIGHT LE - HF 4/5, KE 5/5, DF 5/5, PF 5/5     FUNCTIONAL PROGRESS  Gait - Ambulated with RW with CG assist  Transfers - Sit to stand with supervision  Negotiated 4 steps with 2 HR   Shower transfer: min assist   RECENT LABS:                       9.2    11.0  )-----------( 712      ( 28 Jan 2019 05:50 )             28.6     01-28    136  |  98  |  6<L>  ----------------------------<  110<H>  4.2   |  27  |  0.68    Ca    9.4      28 Jan 2019 05:50    RADIOLOGY/OTHER RESULTS    MEDICATIONS  (STANDING):  atorvastatin 80 milliGRAM(s) Oral at bedtime  buDESOnide 160 MICROgram(s)/formoterol 4.5 MICROgram(s) Inhaler 2 Puff(s) Inhalation two times a day  docusate sodium 100 milliGRAM(s) Oral three times a day  enoxaparin Injectable 40 milliGRAM(s) SubCutaneous every 24 hours  levothyroxine 50 MICROGram(s) Oral daily  lidocaine   Patch 1 Patch Transdermal daily  montelukast 10 milliGRAM(s) Oral daily  pantoprazole    Tablet 40 milliGRAM(s) Oral before breakfast  polyethylene glycol 3350 17 Gram(s) Oral daily  pregabalin 100 milliGRAM(s) Oral every 8 hours  senna 2 Tablet(s) Oral at bedtime  tiotropium 18 MICROgram(s) Capsule 1 Capsule(s) Inhalation daily    MEDICATIONS  (PRN):  acetaminophen   Tablet .. 650 milliGRAM(s) Oral every 6 hours PRN Temp greater or equal to 38C (100.4F), Mild Pain (1 - 3)  bisacodyl Suppository 10 milliGRAM(s) Rectal daily PRN Constipation  diazepam    Tablet 2 milliGRAM(s) Oral every 12 hours PRN anxiousness  guaiFENesin    Syrup 200 milliGRAM(s) Oral at bedtime PRN Cough  magnesium hydroxide Suspension 30 milliLiter(s) Oral daily PRN Constipation  methocarbamol 500 milliGRAM(s) Oral every 8 hours PRN Muscle Spasm  oxyCODONE    IR 10 milliGRAM(s) Oral every 4 hours PRN Severe Pain (7 - 10)  oxyCODONE    IR 5 milliGRAM(s) Oral every 4 hours PRN Moderate Pain (4 - 6)    ASSESSMENT/PLAN:  63F with PMH of HLD, hypothyroidism, GERD, depression/anxiety, asthma, s/p lumbar fusion in 2006 and multilevel ACDF in 2010 presenting with UE weakness and numbness as well neck and arm pain found to have severe multilevel cervical stenosis admitted to [facility] on 1/11/19 s/p C2-T2 posterior decompression and fusion on 1/11/19 being admitted here for rehab with Gait Instability, ADL impairments and Functional impairments.    COMORBIDITES/ACTIVE MEDICAL ISSUES   Continue with Comprehensive Rehab Program of PT/OT   Cervical stenosis: s/p C2-T2 posterior decompression and fusion on 1/11/19 at [facility]  -Continue with comprehensive rehab program  -Follow up with surgeon as outpatient  -Pain control: tylenol prn, oxycodone prn, pregabalin, methocarbomol prn muscle spasm, lidoderm patch. d/c methacarbomol  -Maintain cervical collar  Anemia: Hb stable  -Monitor labs  HLD:-Continue with statin     Asthma:-Continue with Symbicort, Spiriva, singulair    Hypothyroidism:-Continue with synthroid    Reflux:-Continue with protonix    Anxiety: Psych consult noted    Pain Mgmt - Tylenol PRN, oxycodone prn, pregabalin, methocarbomol prn muscle spasm    GI/Bowel Mgmt - Constipation. Continue with Senna, Miralax, colace. milk of mag prn and suppository.     /Bladder Mgmt -toilet schedule prn     FEN :- Diet - Regular + Thins [ DASH/TLC]     Precautions / PROPHYLAXIS:   - Falls, Spinal,   - ortho: Weight bearing status: WBAT    Pressure injury/Skin: Turn Q2hrs while in bed, OOB to Chair, PT/OT   - DVT: Lovenox, SCDs, TEDs     Team meeting 1/24/19-. Goal for discharge--mod I. Discharge planning ? 2/1/19  DC planning:-Schedule appt with Dr. Patrick Turner ( [phone])

## 2019-01-29 NOTE — CHART NOTE - NSCHARTNOTEFT_GEN_A_CORE
Nutrition Follow Up Note  Hospital Course (Per Electronic Medical Record):   Source: Medical Record [X] Patient [X] Family [ ] Nursing Staff [ ]     Diet: DASH-TLC Diet w/ Thin Liquids   Tolerates Diet Well   No Chewing/Swallowing Difficulties   No Recent Nausea, Vomiting, Diarrhea  Educated Patient on Need for Increased Fluids/Fiber   Consumes % of Meals (as Per Documentation)    Obtained Food Preferences from Patient     Enteral/Parenteral Nutrition: N/A    Current Weight: 198.4lb on 1/15  Obtain New Weight   Obtain Weights Weekly     Pertinent Medications: MEDICATIONS  (STANDING):  atorvastatin 80 milliGRAM(s) Oral at bedtime  buDESOnide 160 MICROgram(s)/formoterol 4.5 MICROgram(s) Inhaler 2 Puff(s) Inhalation two times a day  docusate sodium 100 milliGRAM(s) Oral three times a day  enoxaparin Injectable 40 milliGRAM(s) SubCutaneous every 24 hours  levothyroxine 50 MICROGram(s) Oral daily  lidocaine   Patch 1 Patch Transdermal daily  montelukast 10 milliGRAM(s) Oral daily  pantoprazole    Tablet 40 milliGRAM(s) Oral before breakfast  polyethylene glycol 3350 17 Gram(s) Oral daily  pregabalin 100 milliGRAM(s) Oral every 8 hours  senna 2 Tablet(s) Oral at bedtime  tiotropium 18 MICROgram(s) Capsule 1 Capsule(s) Inhalation daily    MEDICATIONS  (PRN):  acetaminophen   Tablet .. 650 milliGRAM(s) Oral every 6 hours PRN Temp greater or equal to 38C (100.4F), Mild Pain (1 - 3)  bisacodyl Suppository 10 milliGRAM(s) Rectal daily PRN Constipation  diazepam    Tablet 2 milliGRAM(s) Oral every 12 hours PRN anxiousness  guaiFENesin    Syrup 200 milliGRAM(s) Oral at bedtime PRN Cough  magnesium hydroxide Suspension 30 milliLiter(s) Oral daily PRN Constipation  methocarbamol 500 milliGRAM(s) Oral every 8 hours PRN Muscle Spasm  oxyCODONE    IR 10 milliGRAM(s) Oral every 4 hours PRN Severe Pain (7 - 10)  oxyCODONE    IR 5 milliGRAM(s) Oral every 4 hours PRN Moderate Pain (4 - 6)    Pertinent Labs:  01-28 Na136 mmol/L Glu 110 mg/dL<H> K+ 4.2 mmol/L Cr  0.68 mg/dL BUN 6 mg/dL<L>    Skin: No Pressure Ulcers     Edema: None Noted     Last BM: on 1/27    Estimated Needs:   [X] No Change since Previous Assessment    Previous Nutrition Diagnosis:   Obese    Nutrition Diagnosis is [X] Ongoing - Educated Patient on Need for Increased Fluids/Fiber      New Nutrition Diagnosis: [X] Not Applicable    Interventions:   1. Recommend Continue Nutrition Plan of Care   2. Educated Patient on Need for Increased Fluids/Fiber     Monitoring & Evaluation:   [X] Weights   [X] PO Intake   [X] Follow Up (Per Protocol)  [X] Tolerance to Diet Prescription   [X] Other: Labs     RD Remains Available.  Aneesh Wray RD

## 2019-01-30 ENCOUNTER — TRANSCRIPTION ENCOUNTER (OUTPATIENT)
Age: 64
End: 2019-01-30

## 2019-01-30 PROCEDURE — 99233 SBSQ HOSP IP/OBS HIGH 50: CPT

## 2019-01-30 PROCEDURE — 99232 SBSQ HOSP IP/OBS MODERATE 35: CPT

## 2019-01-30 RX ADMIN — BUDESONIDE AND FORMOTEROL FUMARATE DIHYDRATE 2 PUFF(S): 160; 4.5 AEROSOL RESPIRATORY (INHALATION) at 21:13

## 2019-01-30 RX ADMIN — Medication 100 MILLIGRAM(S): at 06:22

## 2019-01-30 RX ADMIN — Medication 100 MILLIGRAM(S): at 14:29

## 2019-01-30 RX ADMIN — TIOTROPIUM BROMIDE 1 CAPSULE(S): 18 CAPSULE ORAL; RESPIRATORY (INHALATION) at 08:04

## 2019-01-30 RX ADMIN — ENOXAPARIN SODIUM 40 MILLIGRAM(S): 100 INJECTION SUBCUTANEOUS at 06:22

## 2019-01-30 RX ADMIN — Medication 50 MICROGRAM(S): at 06:22

## 2019-01-30 RX ADMIN — SENNA PLUS 2 TABLET(S): 8.6 TABLET ORAL at 21:57

## 2019-01-30 RX ADMIN — Medication 2 MILLIGRAM(S): at 21:56

## 2019-01-30 RX ADMIN — Medication 100 MILLIGRAM(S): at 21:57

## 2019-01-30 RX ADMIN — Medication 100 MILLIGRAM(S): at 14:28

## 2019-01-30 RX ADMIN — OXYCODONE HYDROCHLORIDE 10 MILLIGRAM(S): 5 TABLET ORAL at 21:58

## 2019-01-30 RX ADMIN — OXYCODONE HYDROCHLORIDE 10 MILLIGRAM(S): 5 TABLET ORAL at 20:15

## 2019-01-30 RX ADMIN — OXYCODONE HYDROCHLORIDE 10 MILLIGRAM(S): 5 TABLET ORAL at 08:38

## 2019-01-30 RX ADMIN — LIDOCAINE 1 PATCH: 4 CREAM TOPICAL at 17:35

## 2019-01-30 RX ADMIN — MONTELUKAST 10 MILLIGRAM(S): 4 TABLET, CHEWABLE ORAL at 11:14

## 2019-01-30 RX ADMIN — OXYCODONE HYDROCHLORIDE 10 MILLIGRAM(S): 5 TABLET ORAL at 08:37

## 2019-01-30 RX ADMIN — POLYETHYLENE GLYCOL 3350 17 GRAM(S): 17 POWDER, FOR SOLUTION ORAL at 11:14

## 2019-01-30 RX ADMIN — Medication 100 MILLIGRAM(S): at 21:56

## 2019-01-30 RX ADMIN — BUDESONIDE AND FORMOTEROL FUMARATE DIHYDRATE 2 PUFF(S): 160; 4.5 AEROSOL RESPIRATORY (INHALATION) at 08:03

## 2019-01-30 RX ADMIN — LIDOCAINE 1 PATCH: 4 CREAM TOPICAL at 22:39

## 2019-01-30 RX ADMIN — ATORVASTATIN CALCIUM 80 MILLIGRAM(S): 80 TABLET, FILM COATED ORAL at 21:56

## 2019-01-30 RX ADMIN — PANTOPRAZOLE SODIUM 40 MILLIGRAM(S): 20 TABLET, DELAYED RELEASE ORAL at 06:22

## 2019-01-30 RX ADMIN — LIDOCAINE 1 PATCH: 4 CREAM TOPICAL at 10:10

## 2019-01-30 RX ADMIN — LIDOCAINE 1 PATCH: 4 CREAM TOPICAL at 11:13

## 2019-01-30 NOTE — DISCHARGE NOTE ADULT - INSTRUCTIONS
Regular diet, sodium and cholesterol controlled steri strips to incision,open to air. precautions as instructed

## 2019-01-30 NOTE — PROGRESS NOTE ADULT - ASSESSMENT
Pt is a 63 y o F admitted to rehab after recent C2-T2 posterior decompression and fusion.    #S/p decompression/fusion: continue rehab, cervical collar, pain control    #Asthma: pt is wheezing, will add Duoneb, continue Symbicort, Spiriva, Singulair    #Hypothyroid: continue Synthroid    #DVT PPX: stable on Lovenox

## 2019-01-30 NOTE — DISCHARGE NOTE ADULT - MEDICATION SUMMARY - MEDICATIONS TO TAKE
I will START or STAY ON the medications listed below when I get home from the hospital:    diazePAM 2 mg oral tablet  -- 1 tab(s) by mouth every 12 hours, As needed, anxiousness  -- Indication: For Anxiety    pregabalin 100 mg oral capsule  -- 1 cap(s) by mouth every 8 hours  -- Indication: For Pain     atorvastatin 80 mg oral tablet  -- 1 tab(s) by mouth once a day (at bedtime)  -- Indication: For Hyperlipidemia    Ventolin CFC free 90 mcg/inh inhalation aerosol  -- 2 puff(s) inhaled 4 times a day, As Needed  -- Indication: For Asthma    budesonide-formoterol 160 mcg-4.5 mcg/inh inhalation aerosol  -- 2 puff(s) inhaled 2 times a day  -- Indication: For Asthma    tiotropium 18 mcg inhalation capsule  -- 1 cap(s) inhaled once a day  -- Indication: For Asthma    lidocaine 5% topical film  -- Apply on skin to affected area once a day  -- Indication: For Pain     docusate sodium 100 mg oral capsule  -- 1 cap(s) by mouth 3 times a day  -- Indication: For Constipation    polyethylene glycol 3350 oral powder for reconstitution  -- 17 gram(s) by mouth once a day  -- Indication: For Constipation    senna oral tablet  -- 2 tab(s) by mouth once a day (at bedtime)  -- Indication: For Constipation    montelukast 10 mg oral tablet  -- 1 tab(s) by mouth once a day  -- Indication: For Asthma    NexIUM 20 mg oral delayed release capsule  -- 1 cap(s) by mouth once a day  -- Indication: For Reflux    levothyroxine 50 mcg (0.05 mg) oral tablet  -- 1 tab(s) by mouth once a day  -- Indication: For Hypothyroidism

## 2019-01-30 NOTE — DISCHARGE NOTE ADULT - NS AS ACTIVITY OBS
Walking-Outdoors allowed/Activity allowed as recommended by physician and therapist/Walking-Indoors allowed/No Heavy lifting/straining/Do not drive or operate machinery

## 2019-01-30 NOTE — PROGRESS NOTE ADULT - SUBJECTIVE AND OBJECTIVE BOX
HPI:  63F with PMH of HLD, hypothyroidism, GERD, depression/anxiety, asthma, s/p lumbar fusion in 2006 and multilevel ACDF in 2010 presenting with UE weakness and numbness as well neck and arm pain found to have severe multilevel cervical stenosis admitted to [facility] on 1/11/19 s/p C2-T2 posterior decompression and fusion on 1/11/19 being admitted here for rehab. Pt developed UE weakness and numbness as well as neck and arm pain. She was found to have severe multilevel cervical stenosis despite previous ACDF. She presented to [facility] on 1/11/19 for elective C2-T2 posterior decompression with laminectomy, facetectomy, foraminectomy from C2 to T1. She was managed post op for pain. Her hospital course was complicated by acute onset right thumb numbness. CT cervical/thoracic spine was unremarkable. On POD5, she presented with fever. She was pan cultured and started empirically on antibiotics (zpsyn) for possible UTI for 24 hours. Infections workup (UA, urine culture, blood culture, CXR) were negative. Antibiotics were discontinued and patient remained stable off of antibiotics.   Patient was medically stabilized and admitted here for rehab. (18 Jan 2019 13:33)    Subjective: Pt seen and examined this am.  Discussed with OT, who reports that patient did well in OT this am with dressing and donning and doff of neck brace, but had some increased shoulder pain  Pain well controlled with current regimen    REVIEW OF SYMPTOMS  Denies HA/Chest pain, SOB/Palpitations  Denies abdominal pain/nausea  Denies any new weakness/numbness  Denies dysuria or incontinence    Vital Signs Last 24 Hrs  T(C): 36.7 (29 Jan 2019 20:02), Max: 36.7 (29 Jan 2019 20:02)  T(F): 98 (29 Jan 2019 20:02), Max: 98 (29 Jan 2019 20:02)  HR: 72 (30 Jan 2019 08:05) (72 - 87)  BP: 106/70 (29 Jan 2019 20:02) (106/70 - 106/70)  BP(mean): --  RR: 15 (29 Jan 2019 20:02) (15 - 15)  SpO2: 92% (30 Jan 2019 08:05) (92% - 95%)    PHYSICAL EXAM  Constitutional - NAD, Comfortable  HEENT - NCAT  Neck - aspen collar, surgical incision with steri-strips, c/d/i  Chest - CTA bilaterally  Cardiovascular - RRR, S1S2  Abdomen - BS+, Soft, NTND  Extremities - No C/C/E, No calf tenderness   LEFT UE - ShAB 4/5, EF 4/5, EE 4/5, WE 4/5,  4/5  RIGHT UE - ShAB 4/5, EF 4/5, EE 4/5, WE 4/5,  4/5  LE 5/5    FUNCTIONAL PROGRESS  Gait - Ambulated with RW with Close supervision  Transfers - Sit to stand with supervision  Dressing: supervision   RECENT LABS:                       9.2    11.0  )-----------( 712      ( 28 Jan 2019 05:50 )             28.6     01-28    136  |  98  |  6<L>  ----------------------------<  110<H>  4.2   |  27  |  0.68    Ca    9.4      28 Jan 2019 05:50    RADIOLOGY/OTHER RESULTS    MEDICATIONS  (STANDING):  atorvastatin 80 milliGRAM(s) Oral at bedtime  buDESOnide 160 MICROgram(s)/formoterol 4.5 MICROgram(s) Inhaler 2 Puff(s) Inhalation two times a day  docusate sodium 100 milliGRAM(s) Oral three times a day  enoxaparin Injectable 40 milliGRAM(s) SubCutaneous every 24 hours  levothyroxine 50 MICROGram(s) Oral daily  lidocaine   Patch 1 Patch Transdermal daily  montelukast 10 milliGRAM(s) Oral daily  pantoprazole    Tablet 40 milliGRAM(s) Oral before breakfast  polyethylene glycol 3350 17 Gram(s) Oral daily  pregabalin 100 milliGRAM(s) Oral every 8 hours  senna 2 Tablet(s) Oral at bedtime  tiotropium 18 MICROgram(s) Capsule 1 Capsule(s) Inhalation daily    MEDICATIONS  (PRN):  acetaminophen   Tablet .. 650 milliGRAM(s) Oral every 6 hours PRN Temp greater or equal to 38C (100.4F), Mild Pain (1 - 3)  bisacodyl Suppository 10 milliGRAM(s) Rectal daily PRN Constipation  diazepam    Tablet 2 milliGRAM(s) Oral every 12 hours PRN anxiousness  guaiFENesin    Syrup 200 milliGRAM(s) Oral at bedtime PRN Cough  magnesium hydroxide Suspension 30 milliLiter(s) Oral daily PRN Constipation  methocarbamol 500 milliGRAM(s) Oral every 8 hours PRN Muscle Spasm  oxyCODONE    IR 10 milliGRAM(s) Oral every 4 hours PRN Severe Pain (7 - 10)  oxyCODONE    IR 5 milliGRAM(s) Oral every 4 hours PRN Moderate Pain (4 - 6)        ASSESSMENT/PLAN:  63F with PMH of HLD, hypothyroidism, GERD, depression/anxiety, asthma, s/p lumbar fusion in 2006 and multilevel ACDF in 2010 presenting with UE weakness and numbness as well neck and arm pain found to have severe multilevel cervical stenosis admitted to [facility] on 1/11/19 s/p C2-T2 posterior decompression and fusion on 1/11/19 admitted here for rehab on 1/18/2019.    COMORBIDITES/ACTIVE MEDICAL ISSUES   Continue with Comprehensive Rehab Program of PT/OT   Cervical stenosis: s/p C2-T2 posterior decompression and fusion on 1/11/19 at [facility]  -Continue with comprehensive rehab program  -Pain control: tylenol prn, oxycodone prn, pregabalin, methocarbomol prn muscle spasm, lidoderm patch. d/c methacarbomol  -Maintain cervical collar    Anemia: Hb stable, -Monitor labs    HLD:-Continue with statin     Asthma:-Continue with Symbicort, Spiriva, singulair    Hypothyroidism:-on synthroid    Reflux:-Continue with protonix    Anxiety: Psych consult noted    Pain Mgmt - Tylenol PRN, oxycodone prn, pregabalin, d/c  methocarbomol prn muscle spasm    GI/Bowel Mgmt - Constipation. Continue with Senna, Miralax, colace. milk of mag prn and suppository.     /Bladder Mgmt -toilet schedule prn     FEN :- Diet - Regular + Thins [ DASH/TLC]     Precautions / PROPHYLAXIS:   - Falls, Spinal,   - ortho: Weight bearing status: WBAT    Pressure injury/Skin: Turn Q2hrs while in bed, OOB to Chair, PT/OT   - DVT: Lovenox, SCDs, TEDs   DC planning:-Schedule appt with Dr. Patrick Turner ( [phone])    Disp: Progressing well in therapy. discussed with daughter over phone.  Discussed with PT/OT.  Family installing railings on friday  would benefit from additional day of therapy. d/c home 2/2 with home care

## 2019-01-30 NOTE — DISCHARGE NOTE ADULT - HOSPITAL COURSE
63F with PMH of HLD, hypothyroidism, GERD, depression/anxiety, asthma, s/p lumbar fusion in 2006 and multilevel ACDF in 2010 presenting with UE weakness and numbness as well neck and arm pain found to have severe multilevel cervical stenosis admitted to [facility] on 1/11/19 s/p C2-T2 posterior decompression and fusion on 1/11/19 being admitted here for rehab. Pt developed UE weakness and numbness as well as neck and arm pain. She was found to have severe multilevel cervical stenosis despite previous ACDF. She presented to [facility] on 1/11/19 for elective C2-T2 posterior decompression with laminectomy, facetectomy, foraminectomy from C2 to T1. She was managed post op for pain. Her hospital course was complicated by acute onset right thumb numbness. CT cervical/thoracic spine was unremarkable. On POD5, she presented with fever. She was pan cultured and started empirically on antibiotics (zpsyn) for possible UTI for 24 hours. Infections workup (UA, urine culture, blood culture, CXR) were negative. Antibiotics were discontinued and patient remained stable off of antibiotics.    Patient was admitted for rehab. Her hospital course was complicated by fever on day of admission. Infectious work up was negative. Patient remained stable off of antibiotics. For her history of anxiety, psychiatry was consulted. Her medications were adjusted and patient remained stable at time of discharge. Patient will continue to follow up with her primary care doctor for further management.     Patient made functional improvements with ADLs, mobility and gait through therapy. Patient will continue with therapy as an outpatient. Patient medically stable for discharge.

## 2019-01-30 NOTE — DISCHARGE NOTE ADULT - CARE PLAN
Principal Discharge DX:	Spinal stenosis  Goal:	Optimize medical recovery  Assessment and plan of treatment:	Patient will continue with therapy and follow up with surgeon in 1-2 weeks upon discharge  Secondary Diagnosis:	Hypothyroidism  Goal:	Optimize medical recovery  Assessment and plan of treatment:	Patient will continue with medications and follow up with primary care doctor in 1-2 weeks upon discharge.  Secondary Diagnosis:	Hyperlipidemia  Goal:	Optimize medical recovery  Assessment and plan of treatment:	Patient will continue with medications and follow up with primary care doctor in 1-2 weeks upon discharge  Secondary Diagnosis:	Anxiety  Goal:	Optimize medical recovery  Assessment and plan of treatment:	Psychiatry was consulted and adjusted her medications. Patient will continue to follow up with her primary care doctor in 1-2 weeks for further management.

## 2019-01-30 NOTE — PROGRESS NOTE ADULT - SUBJECTIVE AND OBJECTIVE BOX
CC: Patient is a 63y old  Female who presents with a chief complaint of Cervical Spinal Stenosis (30 Jan 2019 09:53)      S: No f/c/n/v/pain    Patient seen and examined at bedside.    ALLERGIES:  No Known Allergies      MEDICATIONS:  atorvastatin 80 milliGRAM(s) Oral at bedtime  bisacodyl Suppository 10 milliGRAM(s) Rectal daily PRN  diazepam    Tablet 2 milliGRAM(s) Oral every 12 hours PRN  docusate sodium 100 milliGRAM(s) Oral three times a day  lidocaine   Patch 1 Patch Transdermal daily  magnesium hydroxide Suspension 30 milliLiter(s) Oral daily PRN  oxyCODONE    IR 10 milliGRAM(s) Oral every 4 hours PRN  oxyCODONE    IR 5 milliGRAM(s) Oral every 4 hours PRN  pregabalin 100 milliGRAM(s) Oral every 8 hours        Vital Signs Last 24 Hrs  T(F): 98 (30 Jan 2019 10:06), Max: 98 (29 Jan 2019 20:02)  HR: 72 (30 Jan 2019 10:06) (72 - 87)  BP: 125/75 (30 Jan 2019 10:06) (106/70 - 125/75)  RR: 14 (30 Jan 2019 10:06) (14 - 15)  SpO2: 95% (30 Jan 2019 10:06) (92% - 95%)  I&O's Summary      PHYSICAL EXAM:  General: NAD  ENT: MMM  Neck: Supple, No JVD  Lungs: Clear to auscultation bilaterally  Cardio: RRR, S1/S2, No murmurs  Abdomen: Soft, Nontender, Nondistended; Bowel sounds present  Extremities: No cyanosis, No edema  Neuro: no new deficits  Skin: no rashes  Psych: AAO    LABS:                        9.2    11.0  )-----------( 712      ( 28 Jan 2019 05:50 )             28.6     01-28    136  |  98  |  6   ----------------------------<  110  4.2   |  27  |  0.68    Ca    9.4      28 Jan 2019 05:50      eGFR if Non African American: 92 mL/min/1.73M2 (01-28-19 @ 05:50)  eGFR if : 107 mL/min/1.73M2 (01-28-19 @ 05:50)                    CAPILLARY BLOOD GLUCOSE                RADIOLOGY & ADDITIONAL TESTS:    Care Discussed with Consultants/Other Providers:

## 2019-01-30 NOTE — DISCHARGE NOTE ADULT - PROVIDER TOKENS
FREE:[LAST:[Dr. Turner],PHONE:[phone]],FAX:[(   )    -]],FREE:[LAST:[Primary care doctor],PHONE:[(   )    -],FAX:[(   )    -],ADDRESS:[Primary care doctor]],FREE:[LAST:[Dr. Turner],PHONE:[phone]],FAX:[(   )    -],ADDRESS:[Surgeon]]

## 2019-01-30 NOTE — DISCHARGE NOTE ADULT - CARE PROVIDER_API CALL
Dr. Turner,   Phone: [phone]  Fax: (   )    -    Primary care doctor,   Primary care doctor  Phone: (   )    -  Fax: (   )    -    Dr. Turner,   Surgeon  Phone: [phone]  Fax: (   )    -

## 2019-01-30 NOTE — DISCHARGE NOTE ADULT - MEDICATION SUMMARY - MEDICATIONS TO STOP TAKING
I will STOP taking the medications listed below when I get home from the hospital:    Neurontin 600 mg oral tablet  -- 1 tab(s) by mouth 3 times a day    predniSONE 10 mg oral tablet  -- 4 tab(s)  once a day x 3 days  3 tab(s)  once a day x 3 days  2 tab(s)  once a day x 3 days  1 tab(s)  once a day x 3 days    azithromycin 250 mg oral tablet  -- 1 tab(s) by mouth once a day

## 2019-01-30 NOTE — DISCHARGE NOTE ADULT - PLAN OF CARE
Optimize medical recovery Patient will continue with therapy and follow up with surgeon in 1-2 weeks upon discharge Patient will continue with medications and follow up with primary care doctor in 1-2 weeks upon discharge. Patient will continue with medications and follow up with primary care doctor in 1-2 weeks upon discharge Psychiatry was consulted and adjusted her medications. Patient will continue to follow up with her primary care doctor in 1-2 weeks for further management.

## 2019-01-31 PROCEDURE — 99232 SBSQ HOSP IP/OBS MODERATE 35: CPT | Mod: GC

## 2019-01-31 PROCEDURE — 99233 SBSQ HOSP IP/OBS HIGH 50: CPT

## 2019-01-31 RX ORDER — DOCUSATE SODIUM 100 MG
1 CAPSULE ORAL
Qty: 0 | Refills: 0 | DISCHARGE
Start: 2019-01-31

## 2019-01-31 RX ORDER — LEVOTHYROXINE SODIUM 125 MCG
1 TABLET ORAL
Qty: 0 | Refills: 0 | DISCHARGE
Start: 2019-01-31

## 2019-01-31 RX ORDER — SENNA PLUS 8.6 MG/1
2 TABLET ORAL
Qty: 0 | Refills: 0 | DISCHARGE
Start: 2019-01-31

## 2019-01-31 RX ORDER — ATORVASTATIN CALCIUM 80 MG/1
1 TABLET, FILM COATED ORAL
Qty: 30 | Refills: 0
Start: 2019-01-31 | End: 2019-03-01

## 2019-01-31 RX ORDER — BUDESONIDE AND FORMOTEROL FUMARATE DIHYDRATE 160; 4.5 UG/1; UG/1
2 AEROSOL RESPIRATORY (INHALATION)
Qty: 0 | Refills: 0 | COMMUNITY

## 2019-01-31 RX ORDER — IPRATROPIUM/ALBUTEROL SULFATE 18-103MCG
3 AEROSOL WITH ADAPTER (GRAM) INHALATION EVERY 4 HOURS
Qty: 0 | Refills: 0 | Status: DISCONTINUED | OUTPATIENT
Start: 2019-01-31 | End: 2019-02-02

## 2019-01-31 RX ORDER — TIOTROPIUM BROMIDE 18 UG/1
1 CAPSULE ORAL; RESPIRATORY (INHALATION)
Qty: 0 | Refills: 0 | DISCHARGE
Start: 2019-01-31

## 2019-01-31 RX ORDER — BUDESONIDE AND FORMOTEROL FUMARATE DIHYDRATE 160; 4.5 UG/1; UG/1
2 AEROSOL RESPIRATORY (INHALATION)
Qty: 0 | Refills: 0 | DISCHARGE
Start: 2019-01-31

## 2019-01-31 RX ORDER — MONTELUKAST 4 MG/1
1 TABLET, CHEWABLE ORAL
Qty: 0 | Refills: 0 | DISCHARGE
Start: 2019-01-31

## 2019-01-31 RX ORDER — LIDOCAINE 4 G/100G
1 CREAM TOPICAL
Qty: 0 | Refills: 0 | DISCHARGE
Start: 2019-01-31

## 2019-01-31 RX ORDER — POLYETHYLENE GLYCOL 3350 17 G/17G
17 POWDER, FOR SOLUTION ORAL
Qty: 0 | Refills: 0 | DISCHARGE
Start: 2019-01-31

## 2019-01-31 RX ORDER — DIAZEPAM 5 MG
1 TABLET ORAL
Qty: 0 | Refills: 0 | COMMUNITY
Start: 2019-01-31

## 2019-01-31 RX ORDER — ROSUVASTATIN CALCIUM 5 MG/1
1 TABLET ORAL
Qty: 0 | Refills: 0 | COMMUNITY

## 2019-01-31 RX ADMIN — Medication 100 MILLIGRAM(S): at 16:08

## 2019-01-31 RX ADMIN — TIOTROPIUM BROMIDE 1 CAPSULE(S): 18 CAPSULE ORAL; RESPIRATORY (INHALATION) at 08:36

## 2019-01-31 RX ADMIN — OXYCODONE HYDROCHLORIDE 10 MILLIGRAM(S): 5 TABLET ORAL at 10:27

## 2019-01-31 RX ADMIN — ENOXAPARIN SODIUM 40 MILLIGRAM(S): 100 INJECTION SUBCUTANEOUS at 06:44

## 2019-01-31 RX ADMIN — Medication 100 MILLIGRAM(S): at 21:22

## 2019-01-31 RX ADMIN — Medication 2 MILLIGRAM(S): at 17:47

## 2019-01-31 RX ADMIN — Medication 100 MILLIGRAM(S): at 06:44

## 2019-01-31 RX ADMIN — Medication 100 MILLIGRAM(S): at 21:21

## 2019-01-31 RX ADMIN — Medication 650 MILLIGRAM(S): at 17:47

## 2019-01-31 RX ADMIN — POLYETHYLENE GLYCOL 3350 17 GRAM(S): 17 POWDER, FOR SOLUTION ORAL at 11:01

## 2019-01-31 RX ADMIN — SENNA PLUS 2 TABLET(S): 8.6 TABLET ORAL at 21:22

## 2019-01-31 RX ADMIN — BUDESONIDE AND FORMOTEROL FUMARATE DIHYDRATE 2 PUFF(S): 160; 4.5 AEROSOL RESPIRATORY (INHALATION) at 08:36

## 2019-01-31 RX ADMIN — Medication 100 MILLIGRAM(S): at 16:07

## 2019-01-31 RX ADMIN — Medication 650 MILLIGRAM(S): at 17:49

## 2019-01-31 RX ADMIN — Medication 50 MICROGRAM(S): at 06:44

## 2019-01-31 RX ADMIN — LIDOCAINE 1 PATCH: 4 CREAM TOPICAL at 11:01

## 2019-01-31 RX ADMIN — MONTELUKAST 10 MILLIGRAM(S): 4 TABLET, CHEWABLE ORAL at 11:02

## 2019-01-31 RX ADMIN — OXYCODONE HYDROCHLORIDE 10 MILLIGRAM(S): 5 TABLET ORAL at 22:09

## 2019-01-31 RX ADMIN — ATORVASTATIN CALCIUM 80 MILLIGRAM(S): 80 TABLET, FILM COATED ORAL at 21:22

## 2019-01-31 RX ADMIN — OXYCODONE HYDROCHLORIDE 10 MILLIGRAM(S): 5 TABLET ORAL at 16:50

## 2019-01-31 RX ADMIN — BUDESONIDE AND FORMOTEROL FUMARATE DIHYDRATE 2 PUFF(S): 160; 4.5 AEROSOL RESPIRATORY (INHALATION) at 20:52

## 2019-01-31 RX ADMIN — OXYCODONE HYDROCHLORIDE 10 MILLIGRAM(S): 5 TABLET ORAL at 17:48

## 2019-01-31 RX ADMIN — OXYCODONE HYDROCHLORIDE 10 MILLIGRAM(S): 5 TABLET ORAL at 10:44

## 2019-01-31 RX ADMIN — LIDOCAINE 1 PATCH: 4 CREAM TOPICAL at 19:31

## 2019-01-31 RX ADMIN — LIDOCAINE 1 PATCH: 4 CREAM TOPICAL at 21:22

## 2019-01-31 RX ADMIN — PANTOPRAZOLE SODIUM 40 MILLIGRAM(S): 20 TABLET, DELAYED RELEASE ORAL at 06:44

## 2019-01-31 RX ADMIN — OXYCODONE HYDROCHLORIDE 10 MILLIGRAM(S): 5 TABLET ORAL at 23:08

## 2019-01-31 NOTE — PROGRESS NOTE ADULT - ASSESSMENT
63F with PMH of HLD, hypothyroidism, GERD, depression/anxiety, asthma, s/p lumbar fusion in 2006 and multilevel ACDF in 2010 presenting with UE weakness and numbness as well neck and arm pain found to have severe multilevel cervical stenosis admitted to [facility] on 1/11/19 s/p C2-T2 posterior decompression and fusion on 1/11/19 admitted here for rehab on 1/18/2019.    COMORBIDITES/ACTIVE MEDICAL ISSUES   -Continue with Comprehensive Rehab Program of PT/OT     Cervical stenosis: s/p C2-T2 posterior decompression and fusion on 1/11/19 at [facility]  -Continue with comprehensive rehab program  -Pain control: tylenol prn, oxycodone prn, pregabalin, lidoderm patch. D/C methacarbomol  -Maintain cervical collar    Anemia: Hb stable, -Monitor labs    HLD:-Continue with statin     Asthma:-Continue with Symbicort, Spiriva, singulair    Hypothyroidism:-on synthroid    Reflux:-Continue with protonix    Anxiety: Psych consult noted    Pain Mgmt - Tylenol PRN, oxycodone prn, pregabalin, d/c  methocarbomol prn muscle spasm    GI/Bowel Mgmt - Constipation. Continue with Senna, Miralax, colace. milk of mag prn and suppository.     /Bladder Mgmt -toilet schedule prn     FEN :- Diet - Regular + Thins [ DASH/TLC]     Precautions / PROPHYLAXIS:   - Falls, Spinal,   - ortho: Weight bearing status: WBAT    Pressure injury/Skin: Turn Q2hrs while in bed, OOB to Chair, PT/OT   - DVT: Lovenox, SCDs, TEDs   DC planning:-Schedule appt with Dr. Patrick Turner ( [phone])    Disp: Progressing well in therapy. discussed with daughter over phone.  Discussed with PT/OT.  Family installing railings on friday  would benefit from additional day of therapy. d/c home 2/2 with home care 63F with PMH of HLD, hypothyroidism, GERD, depression/anxiety, asthma, s/p lumbar fusion in 2006 and multilevel ACDF in 2010 presenting with UE weakness and numbness as well neck and arm pain found to have severe multilevel cervical stenosis admitted to [facility] on 1/11/19 s/p C2-T2 posterior decompression and fusion on 1/11/19 admitted here for rehab on 1/18/2019.    COMORBIDITES/ACTIVE MEDICAL ISSUES   -Continue with Comprehensive Rehab Program of PT/OT     Cervical stenosis: s/p C2-T2 posterior decompression and fusion on 1/11/19 at [facility]  -Continue with comprehensive rehab program  -Pain control: tylenol prn, oxycodone prn, pregabalin, lidoderm patch. D/C methacarbomol  -Maintain cervical collar    Anemia: Hb stable   -Monitor labs    HLD:  -Continue with statin     Asthma: Pt with wheezing this morning, which she attributes to changes in weather  -Continue with Symbicort, Spiriva, singulair  -Start duoneb prn    Hypothyroidism:  -on synthroid    Reflux:  -Continue with protonix    Anxiety: Psych consult noted    Pain Mgmt - Tylenol PRN, oxycodone prn, pregabalin D/C  methocarbomol prn muscle spasm    GI/Bowel Mgmt - Constipation. Continue with Senna, Miralax, colace. milk of mag prn and suppository.     /Bladder Mgmt -toilet schedule prn     FEN :- Diet - Regular + Thins [ DASH/TLC]     Precautions / PROPHYLAXIS:   - Falls, Spinal,   - ortho: Weight bearing status: WBAT    Pressure injury/Skin: Turn Q2hrs while in bed, OOB to Chair, PT/OT   - DVT: Lovenox, SCDs, TEDs   DC planning:-Schedule appt with Dr. Patrick Turner ( [phone])    Disp: Progressing well in therapy. discussed with daughter over phone.  Discussed with PT/OT.  Family installing railings on friday  would benefit from additional day of therapy. d/c home 2/2 with home care 63F with PMH of HLD, hypothyroidism, GERD, depression/anxiety, asthma, s/p lumbar fusion in 2006 and multilevel ACDF in 2010 presenting with UE weakness and numbness as well neck and arm pain found to have severe multilevel cervical stenosis admitted to [facility] on 1/11/19 s/p C2-T2 posterior decompression and fusion on 1/11/19 admitted here for rehab on 1/18/2019.    COMORBIDITES/ACTIVE MEDICAL ISSUES   -Continue with Comprehensive Rehab Program of PT/OT     Cervical stenosis: s/p C2-T2 posterior decompression and fusion on 1/11/19 at [facility]  -Continue with comprehensive rehab program  -Pain control: tylenol prn, oxycodone prn, pregabalin, lidoderm patch. D/C methacarbomol  -Maintain cervical collar    Anemia: Hb stable   -Monitor labs    HLD:  -Continue with statin     Asthma: Pt with wheezing this morning, which she attributes to changes in weather  -Continue with Symbicort, Spiriva, singulair  -Start duoneb prn    Hypothyroidism:  -on synthroid    Reflux:  -Continue with protonix    Anxiety: Psych consult noted and medications adjusted     Pain Mgmt - Tylenol PRN, oxycodone prn, pregabalin. D/C methocarbomol prn muscle spasm    GI/Bowel Mgmt - Continue with Senna, Miralax, colace. milk of mag prn and suppository.     /Bladder Mgmt -toilet schedule prn     FEN :- Diet - Regular + Thins [ DASH/TLC]     Precautions / PROPHYLAXIS:   - Falls, Spinal,   - ortho: Weight bearing status: WBAT   - Pressure injury/Skin: Turn Q2hrs while in bed, OOB to Chair, PT/OT   - DVT: Lovenox, SCDs, TEDs     Disp: Progressing well in therapy. Discussed with daughter over phone. Plan for discharge 2/2/19 with home care.   Discussed with PT/OT.  Family installing railings on Friday

## 2019-01-31 NOTE — PROGRESS NOTE ADULT - ASSESSMENT
Pt is a 63 y o F admitted to rehab after recent C2-T2 posterior decompression and fusion.    #Asthma: pt is wheezing again, added Duoneb, continue Symbicort, Spiriva, Singulair    #S/p decompression/fusion: continue rehab, cervical collar, pain control    #Hypothyroid: continue Synthroid    #DVT PPX: stable on Lovenox

## 2019-01-31 NOTE — PROGRESS NOTE ADULT - ATTENDING COMMENTS
Chart reviewed. Patient seen at bedside.  C/O incisional pain overnight, that has improved  Coordination in hands affecting ADLs.    Continue full rehab program
Pt. seen with resident.  Agree with documentation above as per resident with amendments made as appropriate. Patient medically stable. Making progress towards rehab goals.     Pain controlled.  No BM yet.  Increased bowel meds.  cont. to monitor
Pt. seen with resident.  Agree with documentation above as per resident with amendments made as appropriate. Patient medically stable. Making progress towards rehab goals.     Yesterday night, pt. had a lot  of pain after therapy.  States there was a delay in receiving her evening pain meds so she was uncomfortable throughout most of the night as they did not help her much.  Today she is much more comfortable and her pain is controlled 5/10.  Will d/w nursing
Chart reviewed. Patient seen at bedside. C/O some wheezing and possible asthma flare up. Uses inhaler at home. Auscultation - wheezing. Started on duo nebs during hospital stay  Progressing well in therapy. d/c home on saturday with home care
Pt. seen with resident.  Agree with documentation above as per resident with amendments made as appropriate. Patient medically stable. Making progress towards rehab goals.   Pain controlled today.   constipated --optimize bowel regimen.

## 2019-01-31 NOTE — PROGRESS NOTE ADULT - SUBJECTIVE AND OBJECTIVE BOX
HPI:  63F with PMH of HLD, hypothyroidism, GERD, depression/anxiety, asthma, s/p lumbar fusion in 2006 and multilevel ACDF in 2010 presenting with UE weakness and numbness as well neck and arm pain found to have severe multilevel cervical stenosis admitted to [facility] on 1/11/19 s/p C2-T2 posterior decompression and fusion on 1/11/19 being admitted here for rehab. Pt developed UE weakness and numbness as well as neck and arm pain. She was found to have severe multilevel cervical stenosis despite previous ACDF. She presented to [facility] on 1/11/19 for elective C2-T2 posterior decompression with laminectomy, facetectomy, foraminectomy from C2 to T1. She was managed post op for pain. Her hospital course was complicated by acute onset right thumb numbness. CT cervical/thoracic spine was unremarkable. On POD5, she presented with fever. She was pan cultured and started empirically on antibiotics (zpsyn) for possible UTI for 24 hours.  Infections workup (UA, urine culture, blood culture, CXR) were negative. Antibiotics were discontinued and patient remained stable off of antibiotics.   Patient was medically stabilized and admitted here for rehab. (18 Jan 2019 13:33)    Subjective: Pt seen and examined in bed. No acute events overnight. Pt reports that she is having difficulty with movements bringing her arms behind, i.e. unhooking bra, pulling up pants. Pt reports that she had some wheezing as well, which she attributes to the changes in weather. Reports that her pain is controlled.    REVIEW OF SYMPTOMS  Denies HA/Chest pain  Denies abdominal pain/nausea  Denies any new weakness/numbness  Denies dysuria or incontinence  +Wheezing      VITALS  Vital Signs Last 24 Hrs  T(C): 36.9 (31 Jan 2019 08:35), Max: 37.2 (30 Jan 2019 20:07)  T(F): 98.4 (31 Jan 2019 08:35), Max: 98.9 (30 Jan 2019 20:07)  HR: 81 (31 Jan 2019 09:08) (80 - 83)  BP: 118/72 (31 Jan 2019 08:35) (118/72 - 139/76)  BP(mean): --  RR: 14 (31 Jan 2019 08:35) (14 - 14)  SpO2: 97% (31 Jan 2019 09:08) (93% - 97%)      PHYSICAL EXAM  Constitutional - NAD, Comfortable  HEENT - NCAT  Neck - aspen collar, surgical incision with steri-strips, c/d/i  Chest - CTA bilaterally  Cardiovascular - RRR, S1S2  Abdomen - BS+, Soft, NTND  Extremities - No C/C/E, No calf tenderness   LEFT UE - ShAB 4/5, EF 4/5, EE 4/5, WE 4/5,  4/5  RIGHT UE - ShAB 4/5, EF 4/5, EE 4/5, WE 4/5,  4/5  RLE and LLE 5/5    FUNCTIONAL PROGRESS  Gait - Ambulated with RW with Close supervision  Transfers - Sit to stand with supervision  Dressing: supervision     RECENT LABS    RADIOLOGY/OTHER RESULTS      MEDICATIONS  (STANDING):  atorvastatin 80 milliGRAM(s) Oral at bedtime  buDESOnide 160 MICROgram(s)/formoterol 4.5 MICROgram(s) Inhaler 2 Puff(s) Inhalation two times a day  docusate sodium 100 milliGRAM(s) Oral three times a day  enoxaparin Injectable 40 milliGRAM(s) SubCutaneous every 24 hours  levothyroxine 50 MICROGram(s) Oral daily  lidocaine   Patch 1 Patch Transdermal daily  montelukast 10 milliGRAM(s) Oral daily  pantoprazole    Tablet 40 milliGRAM(s) Oral before breakfast  polyethylene glycol 3350 17 Gram(s) Oral daily  pregabalin 100 milliGRAM(s) Oral every 8 hours  senna 2 Tablet(s) Oral at bedtime  tiotropium 18 MICROgram(s) Capsule 1 Capsule(s) Inhalation daily    MEDICATIONS  (PRN):  acetaminophen   Tablet .. 650 milliGRAM(s) Oral every 6 hours PRN Temp greater or equal to 38C (100.4F), Mild Pain (1 - 3)  ALBUTerol/ipratropium for Nebulization 3 milliLiter(s) Nebulizer every 4 hours PRN Shortness of Breath and/or Wheezing  bisacodyl Suppository 10 milliGRAM(s) Rectal daily PRN Constipation  diazepam    Tablet 2 milliGRAM(s) Oral every 12 hours PRN anxiousness  magnesium hydroxide Suspension 30 milliLiter(s) Oral daily PRN Constipation  oxyCODONE    IR 10 milliGRAM(s) Oral every 4 hours PRN Severe Pain (7 - 10)  oxyCODONE    IR 5 milliGRAM(s) Oral every 4 hours PRN Moderate Pain (4 - 6) HPI:  63F with PMH of HLD, hypothyroidism, GERD, depression/anxiety, asthma, s/p lumbar fusion in 2006 and multilevel ACDF in 2010 presenting with UE weakness and numbness as well neck and arm pain found to have severe multilevel cervical stenosis admitted to [facility] on 1/11/19 s/p C2-T2 posterior decompression and fusion on 1/11/19 being admitted here for rehab. Pt developed UE weakness and numbness as well as neck and arm pain. She was found to have severe multilevel cervical stenosis despite previous ACDF. She presented to [facility] on 1/11/19 for elective C2-T2 posterior decompression with laminectomy, facetectomy, foraminectomy from C2 to T1. She was managed post op for pain. Her hospital course was complicated by acute onset right thumb numbness. CT cervical/thoracic spine was unremarkable. On POD5, she presented with fever. She was pan cultured and started empirically on antibiotics (zpsyn) for possible UTI for 24 hours.  Infections workup (UA, urine culture, blood culture, CXR) were negative. Antibiotics were discontinued and patient remained stable off of antibiotics.   Patient was medically stabilized and admitted here for rehab. (18 Jan 2019 13:33)    Subjective: Pt seen and examined in bed. No acute events overnight. Pt reports that she is having difficulty with movements bringing her arms behind, i.e. unhooking bra, pulling up pants. Pt reports that she had some wheezing as well, which she attributes to the changes in weather. Reports that she uses inhalers as needed at home. Reports that her pain is controlled.    REVIEW OF SYMPTOMS  Denies HA/Chest pain  Denies abdominal pain/nausea  Denies any new weakness/numbness  Denies dysuria or incontinence  +Wheezing      VITALS  Vital Signs Last 24 Hrs  T(C): 36.9 (31 Jan 2019 08:35), Max: 37.2 (30 Jan 2019 20:07)  T(F): 98.4 (31 Jan 2019 08:35), Max: 98.9 (30 Jan 2019 20:07)  HR: 81 (31 Jan 2019 09:08) (80 - 83)  BP: 118/72 (31 Jan 2019 08:35) (118/72 - 139/76)  BP(mean): --  RR: 14 (31 Jan 2019 08:35) (14 - 14)  SpO2: 97% (31 Jan 2019 09:08) (93% - 97%)      PHYSICAL EXAM  Constitutional - NAD, Comfortable  HEENT - NCAT  Neck - aspen collar, surgical incision with steri-strips, c/d/i  Chest - BL wheezing on room air  Cardiovascular - RRR, S1S2  Abdomen - BS+, Soft, NTND  Extremities - No C/C/E, No calf tenderness   LEFT UE - ShAB 4/5, EF 4/5, EE 4/5, WE 4/5,  4/5  RIGHT UE - ShAB 4/5, EF 4/5, EE 4/5, WE 4/5,  4/5  RLE and LLE 5/5    FUNCTIONAL PROGRESS  Gait - Ambulated with RW with Close supervision  Transfers - Sit to stand with supervision  Dressing: supervision     RECENT LABS    RADIOLOGY/OTHER RESULTS      MEDICATIONS  (STANDING):  atorvastatin 80 milliGRAM(s) Oral at bedtime  buDESOnide 160 MICROgram(s)/formoterol 4.5 MICROgram(s) Inhaler 2 Puff(s) Inhalation two times a day  docusate sodium 100 milliGRAM(s) Oral three times a day  enoxaparin Injectable 40 milliGRAM(s) SubCutaneous every 24 hours  levothyroxine 50 MICROGram(s) Oral daily  lidocaine   Patch 1 Patch Transdermal daily  montelukast 10 milliGRAM(s) Oral daily  pantoprazole    Tablet 40 milliGRAM(s) Oral before breakfast  polyethylene glycol 3350 17 Gram(s) Oral daily  pregabalin 100 milliGRAM(s) Oral every 8 hours  senna 2 Tablet(s) Oral at bedtime  tiotropium 18 MICROgram(s) Capsule 1 Capsule(s) Inhalation daily    MEDICATIONS  (PRN):  acetaminophen   Tablet .. 650 milliGRAM(s) Oral every 6 hours PRN Temp greater or equal to 38C (100.4F), Mild Pain (1 - 3)  ALBUTerol/ipratropium for Nebulization 3 milliLiter(s) Nebulizer every 4 hours PRN Shortness of Breath and/or Wheezing  bisacodyl Suppository 10 milliGRAM(s) Rectal daily PRN Constipation  diazepam    Tablet 2 milliGRAM(s) Oral every 12 hours PRN anxiousness  magnesium hydroxide Suspension 30 milliLiter(s) Oral daily PRN Constipation  oxyCODONE    IR 10 milliGRAM(s) Oral every 4 hours PRN Severe Pain (7 - 10)  oxyCODONE    IR 5 milliGRAM(s) Oral every 4 hours PRN Moderate Pain (4 - 6)

## 2019-01-31 NOTE — PROGRESS NOTE ADULT - SUBJECTIVE AND OBJECTIVE BOX
CC: Patient is a 63y old  Female who presents with a chief complaint of Cervical Spinal Stenosis (31 Jan 2019 10:24)      S: No f/c/n/v/pain    Patient seen and examined at bedside.    ALLERGIES:  No Known Allergies      MEDICATIONS:  ALBUTerol/ipratropium for Nebulization 3 milliLiter(s) Nebulizer every 4 hours PRN  atorvastatin 80 milliGRAM(s) Oral at bedtime  bisacodyl Suppository 10 milliGRAM(s) Rectal daily PRN  diazepam    Tablet 2 milliGRAM(s) Oral every 12 hours PRN  docusate sodium 100 milliGRAM(s) Oral three times a day  lidocaine   Patch 1 Patch Transdermal daily  magnesium hydroxide Suspension 30 milliLiter(s) Oral daily PRN  oxyCODONE    IR 10 milliGRAM(s) Oral every 4 hours PRN  oxyCODONE    IR 5 milliGRAM(s) Oral every 4 hours PRN  pregabalin 100 milliGRAM(s) Oral every 8 hours        Vital Signs Last 24 Hrs  T(F): 98.4 (31 Jan 2019 08:35), Max: 98.9 (30 Jan 2019 20:07)  HR: 81 (31 Jan 2019 09:08) (80 - 83)  BP: 118/72 (31 Jan 2019 08:35) (118/72 - 139/76)  RR: 14 (31 Jan 2019 08:35) (14 - 14)  SpO2: 97% (31 Jan 2019 09:08) (93% - 97%)  I&O's Summary      PHYSICAL EXAM:  General: NAD  ENT: MMM  Neck: Supple, No JVD  Lungs: Clear to auscultation bilaterally  Cardio: RRR, S1/S2, No murmurs  Abdomen: Soft, Nontender, Nondistended; Bowel sounds present  Extremities: No cyanosis, No edema  Neuro: no new deficits  Skin: no rashes  Psych: AAO    LABS:                            CAPILLARY BLOOD GLUCOSE                RADIOLOGY & ADDITIONAL TESTS:    Care Discussed with Consultants/Other Providers:

## 2019-02-01 PROCEDURE — 99232 SBSQ HOSP IP/OBS MODERATE 35: CPT | Mod: GC

## 2019-02-01 RX ORDER — OXYCODONE HYDROCHLORIDE 5 MG/1
1 TABLET ORAL
Qty: 28 | Refills: 0
Start: 2019-02-01 | End: 2019-02-07

## 2019-02-01 RX ORDER — LACTULOSE 10 G/15ML
20 SOLUTION ORAL ONCE
Qty: 0 | Refills: 0 | Status: COMPLETED | OUTPATIENT
Start: 2019-02-01 | End: 2019-02-01

## 2019-02-01 RX ORDER — DIAZEPAM 5 MG
1 TABLET ORAL
Qty: 10 | Refills: 0
Start: 2019-02-01 | End: 2019-02-05

## 2019-02-01 RX ADMIN — OXYCODONE HYDROCHLORIDE 5 MILLIGRAM(S): 5 TABLET ORAL at 11:30

## 2019-02-01 RX ADMIN — ATORVASTATIN CALCIUM 80 MILLIGRAM(S): 80 TABLET, FILM COATED ORAL at 21:35

## 2019-02-01 RX ADMIN — LACTULOSE 20 GRAM(S): 10 SOLUTION ORAL at 20:46

## 2019-02-01 RX ADMIN — LIDOCAINE 1 PATCH: 4 CREAM TOPICAL at 17:27

## 2019-02-01 RX ADMIN — POLYETHYLENE GLYCOL 3350 17 GRAM(S): 17 POWDER, FOR SOLUTION ORAL at 10:44

## 2019-02-01 RX ADMIN — LIDOCAINE 1 PATCH: 4 CREAM TOPICAL at 10:45

## 2019-02-01 RX ADMIN — LIDOCAINE 1 PATCH: 4 CREAM TOPICAL at 21:36

## 2019-02-01 RX ADMIN — Medication 100 MILLIGRAM(S): at 05:10

## 2019-02-01 RX ADMIN — SENNA PLUS 2 TABLET(S): 8.6 TABLET ORAL at 21:35

## 2019-02-01 RX ADMIN — ENOXAPARIN SODIUM 40 MILLIGRAM(S): 100 INJECTION SUBCUTANEOUS at 05:10

## 2019-02-01 RX ADMIN — Medication 100 MILLIGRAM(S): at 21:35

## 2019-02-01 RX ADMIN — OXYCODONE HYDROCHLORIDE 10 MILLIGRAM(S): 5 TABLET ORAL at 23:50

## 2019-02-01 RX ADMIN — Medication 50 MICROGRAM(S): at 05:09

## 2019-02-01 RX ADMIN — MONTELUKAST 10 MILLIGRAM(S): 4 TABLET, CHEWABLE ORAL at 10:45

## 2019-02-01 RX ADMIN — Medication 100 MILLIGRAM(S): at 05:09

## 2019-02-01 RX ADMIN — Medication 100 MILLIGRAM(S): at 12:57

## 2019-02-01 RX ADMIN — OXYCODONE HYDROCHLORIDE 5 MILLIGRAM(S): 5 TABLET ORAL at 15:51

## 2019-02-01 RX ADMIN — OXYCODONE HYDROCHLORIDE 5 MILLIGRAM(S): 5 TABLET ORAL at 16:30

## 2019-02-01 RX ADMIN — BUDESONIDE AND FORMOTEROL FUMARATE DIHYDRATE 2 PUFF(S): 160; 4.5 AEROSOL RESPIRATORY (INHALATION) at 20:42

## 2019-02-01 RX ADMIN — OXYCODONE HYDROCHLORIDE 5 MILLIGRAM(S): 5 TABLET ORAL at 10:41

## 2019-02-01 RX ADMIN — PANTOPRAZOLE SODIUM 40 MILLIGRAM(S): 20 TABLET, DELAYED RELEASE ORAL at 05:09

## 2019-02-01 NOTE — PROGRESS NOTE ADULT - SUBJECTIVE AND OBJECTIVE BOX
HPI:  63F with PMH of HLD, hypothyroidism, GERD, depression/anxiety, asthma, s/p lumbar fusion in 2006 and multilevel ACDF in 2010 presenting with UE weakness and numbness as well neck and arm pain found to have severe multilevel cervical stenosis admitted to [facility] on 1/11/19 s/p C2-T2 posterior decompression and fusion on 1/11/19 being admitted here for rehab. Pt developed UE weakness and numbness as well as neck and arm pain. She was found to have severe multilevel cervical stenosis despite previous ACDF. She presented to [facility] on 1/11/19 for elective C2-T2 posterior decompression with laminectomy, facetectomy, foraminectomy from C2 to T1. She was managed post op for pain. Her hospital course was complicated by acute onset right thumb numbness. CT cervical/thoracic spine was unremarkable. On POD5, she presented with fever. She was pan cultured and started empirically on antibiotics (zpsyn) for possible UTI for 24 hours.  Infections workup (UA, urine culture, blood culture, CXR) were negative. Antibiotics were discontinued and patient remained stable off of antibiotics.   Patient was medically stabilized and admitted here for rehab. (18 Jan 2019 13:33)    Subjective: Pt seen and examined in bed. No acute events overnight.   Reports increased discomfort in the trapezial area bilaterally after therapy   No BM since 2 days      REVIEW OF SYMPTOMS  Denies HA/Chest pain  Denies abdominal pain/nausea  Denies any new weakness/numbness  Denies dysuria or incontinence    Vital Signs Last 24 Hrs  T(C): 36.4 (01 Feb 2019 08:24), Max: 36.7 (31 Jan 2019 20:41)  T(F): 97.5 (01 Feb 2019 08:24), Max: 98 (31 Jan 2019 20:41)  HR: 64 (01 Feb 2019 08:24) (64 - 84)  BP: 130/74 (01 Feb 2019 08:24) (116/73 - 130/74)  BP(mean): --  RR: 14 (01 Feb 2019 08:24) (14 - 14)  SpO2: 98% (01 Feb 2019 08:24) (94% - 98%)        PHYSICAL EXAM  Constitutional - NAD, Comfortable  HEENT - NCAT  Neck - aspen collar, surgical incision with steri-strips, c/d/i  Chest - clear  Cardiovascular - RRR, S1S2  Abdomen - BS+, Soft, NTND  Extremities - No C/C/E, No calf tenderness   LEFT UE - ShAB 4/5, EF 4/5, EE 4/5, WE 4/5,  4/5  RIGHT UE - ShAB 4/5, EF 4/5, EE 4/5, WE 4/5,  4/5  RLE and LLE 5/5    FUNCTIONAL PROGRESS  Gait - supervision with RW  ADLS: supervision    RECENT LABS    RADIOLOGY/OTHER RESULTS      MEDICATIONS  (STANDING):  atorvastatin 80 milliGRAM(s) Oral at bedtime  buDESOnide 160 MICROgram(s)/formoterol 4.5 MICROgram(s) Inhaler 2 Puff(s) Inhalation two times a day  docusate sodium 100 milliGRAM(s) Oral three times a day  enoxaparin Injectable 40 milliGRAM(s) SubCutaneous every 24 hours  levothyroxine 50 MICROGram(s) Oral daily  lidocaine   Patch 1 Patch Transdermal daily  montelukast 10 milliGRAM(s) Oral daily  pantoprazole    Tablet 40 milliGRAM(s) Oral before breakfast  polyethylene glycol 3350 17 Gram(s) Oral daily  pregabalin 100 milliGRAM(s) Oral every 8 hours  senna 2 Tablet(s) Oral at bedtime  tiotropium 18 MICROgram(s) Capsule 1 Capsule(s) Inhalation daily    MEDICATIONS  (PRN):  acetaminophen   Tablet .. 650 milliGRAM(s) Oral every 6 hours PRN Temp greater or equal to 38C (100.4F), Mild Pain (1 - 3)  ALBUTerol/ipratropium for Nebulization 3 milliLiter(s) Nebulizer every 4 hours PRN Shortness of Breath and/or Wheezing  bisacodyl Suppository 10 milliGRAM(s) Rectal daily PRN Constipation  diazepam    Tablet 2 milliGRAM(s) Oral every 12 hours PRN anxiousness  magnesium hydroxide Suspension 30 milliLiter(s) Oral daily PRN Constipation  oxyCODONE    IR 10 milliGRAM(s) Oral every 4 hours PRN Severe Pain (7 - 10)  oxyCODONE    IR 5 milliGRAM(s) Oral every 4 hours PRN Moderate Pain (4 - 6)      Assessment and Plan:   · Assessment		  63F with PMH of HLD, hypothyroidism, GERD, depression/anxiety, asthma, s/p lumbar fusion in 2006 and multilevel ACDF in 2010 presenting with UE weakness and numbness as well neck and arm pain found to have severe multilevel cervical stenosis admitted to [facility] on 1/11/19 s/p C2-T2 posterior decompression and fusion on 1/11/19 admitted here for rehab on 1/18/2019.    COMORBIDITES/ACTIVE MEDICAL ISSUES   -Continue with Comprehensive Rehab Program of PT/OT   -Pain control: tylenol prn, oxycodone prn, pregabalin, lidoderm patch. D/C methacarbomol  -Maintain cervical collar    Anemia: Hb stable    HLD:-Continue with statin     Asthma: [place] prn while in hospital-Continue with Symbicort, Spiriva, singulair    Hypothyroidism:-on synthroid    Reflux:-Continue with protonix    Anxiety: Psych consult noted and medications adjusted , Taper valium    GI/Bowel Mgmt - Continue with Senna, Miralax, colace. milk of mag prn and suppository.     /Bladder Mgmt -toilet schedule prn     FEN :- Diet - Regular + Thins [ DASH/TLC]     Precautions / PROPHYLAXIS:   - Falls, Spinal,   - DVT: Lovenox, SCDs, TEDs     DIspo: DC home in am with home care   discussed fu with PCP/Surgeon in 1 week

## 2019-02-02 VITALS — OXYGEN SATURATION: 98 %

## 2019-02-02 PROCEDURE — 99238 HOSP IP/OBS DSCHRG MGMT 30/<: CPT

## 2019-02-02 RX ORDER — OXYCODONE HYDROCHLORIDE 5 MG/1
5 TABLET ORAL EVERY 4 HOURS
Qty: 0 | Refills: 0 | Status: DISCONTINUED | OUTPATIENT
Start: 2019-02-02 | End: 2019-02-02

## 2019-02-02 RX ORDER — OXYCODONE HYDROCHLORIDE 5 MG/1
10 TABLET ORAL EVERY 4 HOURS
Qty: 0 | Refills: 0 | Status: DISCONTINUED | OUTPATIENT
Start: 2019-02-02 | End: 2019-02-02

## 2019-02-02 RX ADMIN — OXYCODONE HYDROCHLORIDE 10 MILLIGRAM(S): 5 TABLET ORAL at 09:37

## 2019-02-02 RX ADMIN — LIDOCAINE 1 PATCH: 4 CREAM TOPICAL at 11:02

## 2019-02-02 RX ADMIN — ENOXAPARIN SODIUM 40 MILLIGRAM(S): 100 INJECTION SUBCUTANEOUS at 05:15

## 2019-02-02 RX ADMIN — Medication 50 MICROGRAM(S): at 05:15

## 2019-02-02 RX ADMIN — POLYETHYLENE GLYCOL 3350 17 GRAM(S): 17 POWDER, FOR SOLUTION ORAL at 11:03

## 2019-02-02 RX ADMIN — OXYCODONE HYDROCHLORIDE 10 MILLIGRAM(S): 5 TABLET ORAL at 00:50

## 2019-02-02 RX ADMIN — PANTOPRAZOLE SODIUM 40 MILLIGRAM(S): 20 TABLET, DELAYED RELEASE ORAL at 05:16

## 2019-02-02 RX ADMIN — OXYCODONE HYDROCHLORIDE 10 MILLIGRAM(S): 5 TABLET ORAL at 10:45

## 2019-02-02 RX ADMIN — MONTELUKAST 10 MILLIGRAM(S): 4 TABLET, CHEWABLE ORAL at 11:03

## 2019-02-02 RX ADMIN — BUDESONIDE AND FORMOTEROL FUMARATE DIHYDRATE 2 PUFF(S): 160; 4.5 AEROSOL RESPIRATORY (INHALATION) at 09:38

## 2019-02-02 RX ADMIN — TIOTROPIUM BROMIDE 1 CAPSULE(S): 18 CAPSULE ORAL; RESPIRATORY (INHALATION) at 09:41

## 2019-02-02 RX ADMIN — Medication 100 MILLIGRAM(S): at 05:15

## 2019-02-02 NOTE — PROGRESS NOTE ADULT - REASON FOR ADMISSION
Cervical Spinal Stenosis

## 2019-02-02 NOTE — PROGRESS NOTE ADULT - SUBJECTIVE AND OBJECTIVE BOX
No overnight events.  Patient is ambulating around her room.  Feels well and ready to go home.  Reports pain is controlled.     REVIEW OF SYSTEMS  Constitutional - No fever,  +fatigue  HEENT - No vertigo, No neck pain  Neurological - No headaches, +loss of strength  Musculoskeletal - +joint pain, No joint swelling, +muscle pain    VITALS  T(C): 36.5 (02-02-19 @ 08:54), Max: 36.5 (02-02-19 @ 08:54)  HR: 74 (02-02-19 @ 08:54) (74 - 81)  BP: 111/72 (02-02-19 @ 08:54) (111/72 - 129/79)  RR: 14 (02-02-19 @ 08:54) (14 - 14)  SpO2: 98% (02-02-19 @ 09:39) (95% - 98%)  Wt(kg): --       MEDICATIONS   acetaminophen   Tablet .. 650 milliGRAM(s) every 6 hours PRN  ALBUTerol/ipratropium for Nebulization 3 milliLiter(s) every 4 hours PRN  atorvastatin 80 milliGRAM(s) at bedtime  bisacodyl Suppository 10 milliGRAM(s) daily PRN  buDESOnide 160 MICROgram(s)/formoterol 4.5 MICROgram(s) Inhaler 2 Puff(s) two times a day  diazepam    Tablet 2 milliGRAM(s) every 12 hours PRN  docusate sodium 100 milliGRAM(s) three times a day  enoxaparin Injectable 40 milliGRAM(s) every 24 hours  levothyroxine 50 MICROGram(s) daily  lidocaine   Patch 1 Patch daily  magnesium hydroxide Suspension 30 milliLiter(s) daily PRN  montelukast 10 milliGRAM(s) daily  oxyCODONE    IR 10 milliGRAM(s) every 4 hours PRN  oxyCODONE    IR 5 milliGRAM(s) every 4 hours PRN  pantoprazole    Tablet 40 milliGRAM(s) before breakfast  polyethylene glycol 3350 17 Gram(s) daily  pregabalin 100 milliGRAM(s) every 8 hours  senna 2 Tablet(s) at bedtime  tiotropium 18 MICROgram(s) Capsule 1 Capsule(s) daily      RECENT LABS/IMAGING                ---------  PHYSICAL EXAM  Constitutional - NAD, Comfortable  Pulm - Breathing comfortably, No wheezing, +C Collar  Abd - Soft, NTND  Extremities - No edema, No calf tenderness  Neurologic Exam -                    Cognitive - Awake, Alert     Communication - Fluent     Motor - BUE weakness     Sensory - Intact to LT  Psychiatric - Mood WNL, Affect WNL    ASSESSMENT/PLAN  63y Female with functional deficits after  CAD - Lipitor  Pain - Tylenol PRN, Oxycodone, Lyrica, Valium, Lidoderm  DVT PPX - Lovenox      DC HOME

## 2019-02-02 NOTE — PROGRESS NOTE ADULT - PROVIDER SPECIALTY LIST ADULT
Hospitalist
Rehab Medicine
Hospitalist
Hospitalist
Rehab Medicine
Hospitalist

## 2019-02-12 PROCEDURE — 97530 THERAPEUTIC ACTIVITIES: CPT

## 2019-02-12 PROCEDURE — 81001 URINALYSIS AUTO W/SCOPE: CPT

## 2019-02-12 PROCEDURE — 83605 ASSAY OF LACTIC ACID: CPT

## 2019-02-12 PROCEDURE — 85027 COMPLETE CBC AUTOMATED: CPT

## 2019-02-12 PROCEDURE — 97110 THERAPEUTIC EXERCISES: CPT

## 2019-02-12 PROCEDURE — 97535 SELF CARE MNGMENT TRAINING: CPT

## 2019-02-12 PROCEDURE — 80053 COMPREHEN METABOLIC PANEL: CPT

## 2019-02-12 PROCEDURE — 94640 AIRWAY INHALATION TREATMENT: CPT

## 2019-02-12 PROCEDURE — 87040 BLOOD CULTURE FOR BACTERIA: CPT

## 2019-02-12 PROCEDURE — 97167 OT EVAL HIGH COMPLEX 60 MIN: CPT

## 2019-02-12 PROCEDURE — 97163 PT EVAL HIGH COMPLEX 45 MIN: CPT

## 2019-02-12 PROCEDURE — 80048 BASIC METABOLIC PNL TOTAL CA: CPT

## 2019-02-12 PROCEDURE — 97116 GAIT TRAINING THERAPY: CPT

## 2019-02-12 PROCEDURE — 83735 ASSAY OF MAGNESIUM: CPT

## 2019-02-12 PROCEDURE — 93005 ELECTROCARDIOGRAM TRACING: CPT

## 2019-07-08 ENCOUNTER — TRANSCRIPTION ENCOUNTER (OUTPATIENT)
Age: 64
End: 2019-07-08

## 2019-07-09 ENCOUNTER — RESULT REVIEW (OUTPATIENT)
Age: 64
End: 2019-07-09

## 2019-07-09 ENCOUNTER — OUTPATIENT (OUTPATIENT)
Dept: OUTPATIENT SERVICES | Facility: HOSPITAL | Age: 64
LOS: 1 days | End: 2019-07-09
Payer: MEDICARE

## 2019-07-09 DIAGNOSIS — M43.22 FUSION OF SPINE, CERVICAL REGION: Chronic | ICD-10-CM

## 2019-07-09 DIAGNOSIS — D50.0 IRON DEFICIENCY ANEMIA SECONDARY TO BLOOD LOSS (CHRONIC): ICD-10-CM

## 2019-07-09 PROCEDURE — 88312 SPECIAL STAINS GROUP 1: CPT | Mod: 26

## 2019-07-09 PROCEDURE — 45380 COLONOSCOPY AND BIOPSY: CPT

## 2019-07-09 PROCEDURE — 88305 TISSUE EXAM BY PATHOLOGIST: CPT

## 2019-07-09 PROCEDURE — 88305 TISSUE EXAM BY PATHOLOGIST: CPT | Mod: 26

## 2019-07-09 PROCEDURE — 88312 SPECIAL STAINS GROUP 1: CPT

## 2019-07-09 PROCEDURE — 43239 EGD BIOPSY SINGLE/MULTIPLE: CPT

## 2019-07-11 LAB — SURGICAL PATHOLOGY STUDY: SIGNIFICANT CHANGE UP

## 2019-08-13 NOTE — DISCHARGE NOTE ADULT - DEVELOP COPING SKILLS. FOR EXAMPLE, STRATEGIES AND LIFESTYLE CHANGES THAT REDUCE NEGATIVE MOODS, STRESS, AND EXPOSURE TO SMOKING CUES
Patient came in to the office to have Medroxyprogesterone administered in office. Patient brought in medication and was dispensed from [facility] pharmacy. Patient tolerated medication administered and was discharged at the  with patient education in hand.         Statement Selected

## 2020-03-31 ENCOUNTER — APPOINTMENT (OUTPATIENT)
Dept: CARDIOLOGY | Facility: CLINIC | Age: 65
End: 2020-03-31

## 2020-04-14 ENCOUNTER — INPATIENT (INPATIENT)
Facility: HOSPITAL | Age: 65
LOS: 0 days | Discharge: HOME | DRG: 310 | End: 2020-04-15
Attending: STUDENT IN AN ORGANIZED HEALTH CARE EDUCATION/TRAINING PROGRAM | Admitting: INTERNAL MEDICINE
Payer: COMMERCIAL

## 2020-04-14 VITALS
OXYGEN SATURATION: 97 % | HEIGHT: 62 IN | RESPIRATION RATE: 18 BRPM | TEMPERATURE: 98 F | WEIGHT: 169.98 LBS | DIASTOLIC BLOOD PRESSURE: 84 MMHG | SYSTOLIC BLOOD PRESSURE: 124 MMHG | HEART RATE: 148 BPM

## 2020-04-14 DIAGNOSIS — M79.2 NEURALGIA AND NEURITIS, UNSPECIFIED: ICD-10-CM

## 2020-04-14 DIAGNOSIS — I10 ESSENTIAL (PRIMARY) HYPERTENSION: ICD-10-CM

## 2020-04-14 DIAGNOSIS — K21.9 GASTRO-ESOPHAGEAL REFLUX DISEASE WITHOUT ESOPHAGITIS: ICD-10-CM

## 2020-04-14 DIAGNOSIS — Z79.899 OTHER LONG TERM (CURRENT) DRUG THERAPY: ICD-10-CM

## 2020-04-14 DIAGNOSIS — R93.89 ABNORMAL FINDINGS ON DIAGNOSTIC IMAGING OF OTHER SPECIFIED BODY STRUCTURES: ICD-10-CM

## 2020-04-14 DIAGNOSIS — Z29.9 ENCOUNTER FOR PROPHYLACTIC MEASURES, UNSPECIFIED: ICD-10-CM

## 2020-04-14 DIAGNOSIS — E03.9 HYPOTHYROIDISM, UNSPECIFIED: ICD-10-CM

## 2020-04-14 DIAGNOSIS — I48.91 UNSPECIFIED ATRIAL FIBRILLATION: ICD-10-CM

## 2020-04-14 DIAGNOSIS — J45.909 UNSPECIFIED ASTHMA, UNCOMPLICATED: ICD-10-CM

## 2020-04-14 DIAGNOSIS — M43.22 FUSION OF SPINE, CERVICAL REGION: Chronic | ICD-10-CM

## 2020-04-14 LAB
ALBUMIN SERPL ELPH-MCNC: 3.9 G/DL — SIGNIFICANT CHANGE UP (ref 3.3–5)
ALP SERPL-CCNC: 97 U/L — SIGNIFICANT CHANGE UP (ref 40–120)
ALT FLD-CCNC: 39 U/L — SIGNIFICANT CHANGE UP (ref 12–78)
ANION GAP SERPL CALC-SCNC: 8 MMOL/L — SIGNIFICANT CHANGE UP (ref 5–17)
APPEARANCE UR: CLEAR — SIGNIFICANT CHANGE UP
AST SERPL-CCNC: 52 U/L — HIGH (ref 15–37)
BASOPHILS # BLD AUTO: 0.03 K/UL — SIGNIFICANT CHANGE UP (ref 0–0.2)
BASOPHILS NFR BLD AUTO: 0.2 % — SIGNIFICANT CHANGE UP (ref 0–2)
BILIRUB SERPL-MCNC: 0.1 MG/DL — LOW (ref 0.2–1.2)
BILIRUB UR-MCNC: NEGATIVE — SIGNIFICANT CHANGE UP
BUN SERPL-MCNC: 12 MG/DL — SIGNIFICANT CHANGE UP (ref 7–23)
CALCIUM SERPL-MCNC: 9.5 MG/DL — SIGNIFICANT CHANGE UP (ref 8.5–10.1)
CHLORIDE SERPL-SCNC: 103 MMOL/L — SIGNIFICANT CHANGE UP (ref 96–108)
CO2 SERPL-SCNC: 26 MMOL/L — SIGNIFICANT CHANGE UP (ref 22–31)
COLOR SPEC: YELLOW — SIGNIFICANT CHANGE UP
CREAT SERPL-MCNC: 0.76 MG/DL — SIGNIFICANT CHANGE UP (ref 0.5–1.3)
DIFF PNL FLD: NEGATIVE — SIGNIFICANT CHANGE UP
EOSINOPHIL # BLD AUTO: 0.05 K/UL — SIGNIFICANT CHANGE UP (ref 0–0.5)
EOSINOPHIL NFR BLD AUTO: 0.4 % — SIGNIFICANT CHANGE UP (ref 0–6)
GLUCOSE SERPL-MCNC: 98 MG/DL — SIGNIFICANT CHANGE UP (ref 70–99)
GLUCOSE UR QL: NEGATIVE — SIGNIFICANT CHANGE UP
HCT VFR BLD CALC: 42.1 % — SIGNIFICANT CHANGE UP (ref 34.5–45)
HGB BLD-MCNC: 14 G/DL — SIGNIFICANT CHANGE UP (ref 11.5–15.5)
IMM GRANULOCYTES NFR BLD AUTO: 0.4 % — SIGNIFICANT CHANGE UP (ref 0–1.5)
KETONES UR-MCNC: NEGATIVE — SIGNIFICANT CHANGE UP
LEUKOCYTE ESTERASE UR-ACNC: NEGATIVE — SIGNIFICANT CHANGE UP
LYMPHOCYTES # BLD AUTO: 26.8 % — SIGNIFICANT CHANGE UP (ref 13–44)
LYMPHOCYTES # BLD AUTO: 3.4 K/UL — HIGH (ref 1–3.3)
MAGNESIUM SERPL-MCNC: 2.2 MG/DL — SIGNIFICANT CHANGE UP (ref 1.6–2.6)
MCHC RBC-ENTMCNC: 29.4 PG — SIGNIFICANT CHANGE UP (ref 27–34)
MCHC RBC-ENTMCNC: 33.3 GM/DL — SIGNIFICANT CHANGE UP (ref 32–36)
MCV RBC AUTO: 88.3 FL — SIGNIFICANT CHANGE UP (ref 80–100)
MONOCYTES # BLD AUTO: 0.64 K/UL — SIGNIFICANT CHANGE UP (ref 0–0.9)
MONOCYTES NFR BLD AUTO: 5 % — SIGNIFICANT CHANGE UP (ref 2–14)
NEUTROPHILS # BLD AUTO: 8.53 K/UL — HIGH (ref 1.8–7.4)
NEUTROPHILS NFR BLD AUTO: 67.2 % — SIGNIFICANT CHANGE UP (ref 43–77)
NITRITE UR-MCNC: NEGATIVE — SIGNIFICANT CHANGE UP
NRBC # BLD: 0 /100 WBCS — SIGNIFICANT CHANGE UP (ref 0–0)
PH UR: 8 — SIGNIFICANT CHANGE UP (ref 5–8)
PLATELET # BLD AUTO: 489 K/UL — HIGH (ref 150–400)
POTASSIUM SERPL-MCNC: 4 MMOL/L — SIGNIFICANT CHANGE UP (ref 3.5–5.3)
POTASSIUM SERPL-SCNC: 4 MMOL/L — SIGNIFICANT CHANGE UP (ref 3.5–5.3)
PROT SERPL-MCNC: 8.2 G/DL — SIGNIFICANT CHANGE UP (ref 6–8.3)
PROT UR-MCNC: NEGATIVE — SIGNIFICANT CHANGE UP
RBC # BLD: 4.77 M/UL — SIGNIFICANT CHANGE UP (ref 3.8–5.2)
RBC # FLD: 12.9 % — SIGNIFICANT CHANGE UP (ref 10.3–14.5)
SODIUM SERPL-SCNC: 137 MMOL/L — SIGNIFICANT CHANGE UP (ref 135–145)
SP GR SPEC: 1 — LOW (ref 1.01–1.02)
TROPONIN I SERPL-MCNC: <.015 NG/ML — SIGNIFICANT CHANGE UP (ref 0.01–0.04)
UROBILINOGEN FLD QL: NEGATIVE — SIGNIFICANT CHANGE UP
WBC # BLD: 12.7 K/UL — HIGH (ref 3.8–10.5)
WBC # FLD AUTO: 12.7 K/UL — HIGH (ref 3.8–10.5)

## 2020-04-14 PROCEDURE — 93010 ELECTROCARDIOGRAM REPORT: CPT

## 2020-04-14 PROCEDURE — 99285 EMERGENCY DEPT VISIT HI MDM: CPT

## 2020-04-14 PROCEDURE — 71045 X-RAY EXAM CHEST 1 VIEW: CPT | Mod: 26

## 2020-04-14 PROCEDURE — 99223 1ST HOSP IP/OBS HIGH 75: CPT | Mod: GC

## 2020-04-14 RX ORDER — DILTIAZEM HCL 120 MG
30 CAPSULE, EXT RELEASE 24 HR ORAL EVERY 8 HOURS
Refills: 0 | Status: DISCONTINUED | OUTPATIENT
Start: 2020-04-14 | End: 2020-04-15

## 2020-04-14 RX ORDER — AZITHROMYCIN 500 MG/1
500 TABLET, FILM COATED ORAL ONCE
Refills: 0 | Status: COMPLETED | OUTPATIENT
Start: 2020-04-14 | End: 2020-04-14

## 2020-04-14 RX ORDER — LEVOTHYROXINE SODIUM 125 MCG
50 TABLET ORAL DAILY
Refills: 0 | Status: DISCONTINUED | OUTPATIENT
Start: 2020-04-14 | End: 2020-04-15

## 2020-04-14 RX ORDER — BUDESONIDE AND FORMOTEROL FUMARATE DIHYDRATE 160; 4.5 UG/1; UG/1
2 AEROSOL RESPIRATORY (INHALATION)
Refills: 0 | Status: DISCONTINUED | OUTPATIENT
Start: 2020-04-14 | End: 2020-04-15

## 2020-04-14 RX ORDER — ACETAMINOPHEN 500 MG
650 TABLET ORAL ONCE
Refills: 0 | Status: COMPLETED | OUTPATIENT
Start: 2020-04-14 | End: 2020-04-14

## 2020-04-14 RX ORDER — DILTIAZEM HCL 120 MG
30 CAPSULE, EXT RELEASE 24 HR ORAL ONCE
Refills: 0 | Status: COMPLETED | OUTPATIENT
Start: 2020-04-14 | End: 2020-04-14

## 2020-04-14 RX ORDER — VALSARTAN 80 MG/1
320 TABLET ORAL DAILY
Refills: 0 | Status: DISCONTINUED | OUTPATIENT
Start: 2020-04-14 | End: 2020-04-14

## 2020-04-14 RX ORDER — PANTOPRAZOLE SODIUM 20 MG/1
40 TABLET, DELAYED RELEASE ORAL
Refills: 0 | Status: DISCONTINUED | OUTPATIENT
Start: 2020-04-14 | End: 2020-04-15

## 2020-04-14 RX ORDER — DILTIAZEM HCL 120 MG
10 CAPSULE, EXT RELEASE 24 HR ORAL ONCE
Refills: 0 | Status: COMPLETED | OUTPATIENT
Start: 2020-04-14 | End: 2020-04-14

## 2020-04-14 RX ORDER — ACETAMINOPHEN 500 MG
650 TABLET ORAL EVERY 6 HOURS
Refills: 0 | Status: DISCONTINUED | OUTPATIENT
Start: 2020-04-14 | End: 2020-04-15

## 2020-04-14 RX ORDER — ENOXAPARIN SODIUM 100 MG/ML
75 INJECTION SUBCUTANEOUS
Refills: 0 | Status: DISCONTINUED | OUTPATIENT
Start: 2020-04-14 | End: 2020-04-15

## 2020-04-14 RX ORDER — ENOXAPARIN SODIUM 100 MG/ML
40 INJECTION SUBCUTANEOUS
Refills: 0 | Status: DISCONTINUED | OUTPATIENT
Start: 2020-04-14 | End: 2020-04-14

## 2020-04-14 RX ORDER — TRAMADOL HYDROCHLORIDE 50 MG/1
25 TABLET ORAL EVERY 6 HOURS
Refills: 0 | Status: DISCONTINUED | OUTPATIENT
Start: 2020-04-14 | End: 2020-04-15

## 2020-04-14 RX ORDER — SODIUM CHLORIDE 9 MG/ML
1000 INJECTION INTRAMUSCULAR; INTRAVENOUS; SUBCUTANEOUS ONCE
Refills: 0 | Status: COMPLETED | OUTPATIENT
Start: 2020-04-14 | End: 2020-04-14

## 2020-04-14 RX ADMIN — Medication 10 MILLIGRAM(S): at 18:37

## 2020-04-14 RX ADMIN — Medication 30 MILLIGRAM(S): at 18:31

## 2020-04-14 RX ADMIN — Medication 650 MILLIGRAM(S): at 18:51

## 2020-04-14 RX ADMIN — AZITHROMYCIN 255 MILLIGRAM(S): 500 TABLET, FILM COATED ORAL at 18:51

## 2020-04-14 RX ADMIN — Medication 30 MILLIGRAM(S): at 22:28

## 2020-04-14 RX ADMIN — Medication 10 MILLIGRAM(S): at 17:43

## 2020-04-14 RX ADMIN — SODIUM CHLORIDE 1000 MILLILITER(S): 9 INJECTION INTRAMUSCULAR; INTRAVENOUS; SUBCUTANEOUS at 18:32

## 2020-04-14 NOTE — H&P ADULT - NSICDXFAMILYHX_GEN_ALL_CORE_FT
FAMILY HISTORY:  Family history of cardiac disorder in father, unknown specifics  FH: myocardial infarction, brother

## 2020-04-14 NOTE — H&P ADULT - NSICDXPASTSURGICALHX_GEN_ALL_CORE_FT
PAST SURGICAL HISTORY:  Cervical vertebral fusion     Kyphosis     S/P Cholecystectomy open   1989    S/P Colonoscopy 2005  wnl    S/P Foot Surgery ORIF left foot    Termination of Pregnancy x3  remote

## 2020-04-14 NOTE — H&P ADULT - PROBLEM SELECTOR PLAN 10
IMPROVE VTE Individual Risk Assessment          RISK                                                          Points  [  ] Previous VTE                                                3  [  ] Thrombophilia                                             2  [  ] Lower limb paralysis                                   2        (unable to hold up >15 seconds)    [  ] Current Cancer                                             2         (within 6 months)  [  ] Immobilization > 24 hrs                              1  [  ] ICU/CCU stay > 24 hours                             1  [ x ] Age > 60                                                         1    IMPROVE VTE Score: 1    - DVT ppx with Lovenox BID IMPROVE VTE Individual Risk Assessment          RISK                                                          Points  [  ] Previous VTE                                                3  [  ] Thrombophilia                                             2  [  ] Lower limb paralysis                                   2        (unable to hold up >15 seconds)    [  ] Current Cancer                                             2         (within 6 months)  [  ] Immobilization > 24 hrs                              1  [  ] ICU/CCU stay > 24 hours                             1  [ x ] Age > 60                                                         1    IMPROVE VTE Score: 1    - BID therapeutic Lovenox

## 2020-04-14 NOTE — ED PROVIDER NOTE - ATTENDING CONTRIBUTION TO CARE
I, Kalpana Rios MD,  performed the initial face to face bedside interview with this patient regarding history of present illness, review of symptoms and relevant past medical, social and family history.  I completed an independent physical examination.  I was the initial provider who evaluated this patient. I have signed out the follow up of any pending tests (i.e. labs, radiological studies) to the ACP.  I have communicated the patient’s plan of care and disposition with the ACP.

## 2020-04-14 NOTE — H&P ADULT - PROBLEM SELECTOR PLAN 4
- H/o spinal stenosis s/p multiple cervical and lower back surgeries  - Takes Lyrica 100mg BID at home, continue while inpatient with hold parameter for respiratory distress  - Pt additionally takes Tramadol 50mg TID prn at home, while inpatient manage pain with Tylenol 650mg PO q6h prn for mild pain and Tramadol 25mg PO q6h prn for moderate pain with hold parameter for resp depression or hypotension - H/o of chronic asthma, reportedly "severe" according to patient  - Pt states she uses an inhaler obtained in [place] that is "similar to Symbicort"  - Pt informed to bring in inhaler from home if possible, otherwise may continue Symbicort while inpatient

## 2020-04-14 NOTE — ED PROVIDER NOTE - OBJECTIVE STATEMENT
pt is a 65yo female bib ems with pmhx of asthma, htn, depression presents with palpitations today. pt reports "irregular" rapid heartbeat with chest pressure. pt went to [facility] who found pt to be in rapid a-fib and referred pt to ed.

## 2020-04-14 NOTE — H&P ADULT - PROBLEM SELECTOR PLAN 7
- Medication list provided directly by patient  - Unable to reconcile meds with pharmacy as [facility] was closed at time of admission  - Day team to confirm meds with pharmacy - Chronic, stable  - Takes Levothyroxine 50mcg daily, continue while inpatient - Chronic, stable  - Takes Levothyroxine 50mcg daily, continue while inpatient  - F/u TSH/T4

## 2020-04-14 NOTE — ED ADULT NURSE NOTE - OBJECTIVE STATEMENT
Rceived pt in bed alert and oriented x4.  C/O chest tightness and rapid hr.  Pt went to  who sent to ER for rapid afib.  EKG complete.  Ongoing nursing care and safety maintained.

## 2020-04-14 NOTE — H&P ADULT - PROBLEM SELECTOR PLAN 1
- Pt reports with no known personal h/o cardiac disease or arrhythmia  - CHADSVASC score 3, will initiate daily DVT ppx with Lovenox BID  - cardio gris - Pt reports with no known personal h/o cardiac disease or arrhythmia  - CHADSVASC score 3, will initiate daily DVT ppx with Lovenox BID dosed according to her weight  - cardio gris  -strt PO cardizem 30mg q8h with hold parameters - Pt reports with no known personal h/o cardiac disease or arrhythmia  - EKG demonstrating afib with vent rate 145  - S/p Cardizem IVP 10mg x2 and Cardizem PO 30mg x1 with appropriate HR response  - CHADSVASC score 3, will initiate twice daily therapeutic DVT with Lovenox dosed according to her weight  - Cardio Dr. Burger consulted, f/u recs  - Start PO cardizem 30mg q8h with hold parameters  - Monitor routine hemodynamics - Pt reports with no known personal h/o cardiac disease or arrhythmia  - EKG demonstrating afib with vent rate 145  - Trop neg x1  - S/p Cardizem IVP 10mg x2 and Cardizem PO 30mg x1 with appropriate HR response  - CHADSVASC score 3, will initiate twice daily therapeutic DVT with Lovenox dosed according to her weight  - Cardio Dr. Burger consulted, f/u recs  - Start PO cardizem 30mg q8h with hold parameters  - Monitor routine hemodynamics

## 2020-04-14 NOTE — CHART NOTE - NSCHARTNOTEFT_GEN_A_CORE
Notified by RN that patient would like to leave AMA. Patient seen at bedside. Patient states that she feels frustrated that she has not been moved to a room upstairs and that she has not received her PM dose of lyrica. Informed patient that she can receive her lyrica now and explained why she was being admitted to the hospital. Patient agreed to stay in hospital thereafter.

## 2020-04-14 NOTE — H&P ADULT - ATTENDING COMMENTS
65 yo F with A fib with RVR present from urgent care. PMH as above.   Patient reports feeling better and wanted to go home, no past hx of MI or TIA or A fib. Remainder as above.     My physical exam I have documented above.     Plan:  A fib with RVR: start PO cardizem and LOVENOX. Risks and benefits of lovenox discussed. Patient agreeable to AC. Dr. Burger to see patient. Check TSH  Infiltrate seen on CXR less likely to be COVID related, do not suspect any acute infection viral or bacterial  Remainder as above.

## 2020-04-14 NOTE — ED PROVIDER NOTE - CARE PLAN
Principal Discharge DX:	Rapid atrial fibrillation  Secondary Diagnosis:	Chest pain  Secondary Diagnosis:	Chest pain

## 2020-04-14 NOTE — H&P ADULT - NSICDXPASTMEDICALHX_GEN_ALL_CORE_FT
PAST MEDICAL HISTORY:  Asthma trigger getting a cold. hospitalized multipl  times last 2007 denies intubation    Cervical Disc Displacement current diagnosis    GERD (Gastroesophageal Reflux Disease)     Hypothyroidism     Osteopenia     Spinal stenosis

## 2020-04-14 NOTE — H&P ADULT - NSHPSOCIALHISTORY_GEN_ALL_CORE
no etoh  use cbd for sleeping  live with   ambulates ind, adls indep  received flu vaccine and pna vaccine Lives at home with   Performs ADLs and ambulates independently  Denies tobacco or EtOH use  Admits CBD use for sleeping, not prescribed by a physician  Received flu and PNA vaccines

## 2020-04-14 NOTE — H&P ADULT - NSHPPHYSICALEXAM_GEN_ALL_CORE
CHARTING IN PROGRESS T(C): 36.6 (04-14-20 @ 17:19), Max: 36.6 (04-14-20 @ 17:19)  HR: 101 (04-14-20 @ 18:58) (94 - 148)  BP: 145/79 (04-14-20 @ 18:58) (124/84 - 147/108)  RR: 16 (04-14-20 @ 18:58) (16 - 18)  SpO2: 97% (04-14-20 @ 18:58) (97% - 100%)  Wt(kg): --    Physical Exam:   GENERAL: well-groomed, well-developed, NAD  HEENT: head NC/AT; EOM intact, conjunctiva & sclera clear; hearing grossly intact, moist mucous membranes  NECK: supple, no JVD  RESPIRATORY: CTA B/L, no wheezing, rales, rhonchi or rubs  CARDIOVASCULAR: S1&S2, irreg irreg,no murmurs or gallops  ABDOMEN: soft, non-tender, non-distended, + Bowel sounds x4 quadrants, no guarding, rebound or rigidity  MUSCULOSKELETAL:  no clubbing, cyanosis or edema of all 4 extremities  LYMPH: no cervical lymphadenopathy  VASCULAR: Radial pulses 2+ bilaterally, no varicose veins   SKIN: warm and dry, color normal  NEUROLOGIC: AA&O X3, CN2-12 intact w/ no focal deficits, no sensory loss, motor Strength 5/5 in UE & LE B/L  Psych: Normal mood and affect, normal behavior

## 2020-04-14 NOTE — H&P ADULT - PROBLEM SELECTOR PLAN 5
- Chronic, stable  - Takes Levothyroxine 50mcg daily, continue while inpatient - Per pt, BP reportedly elevated at home and urgent care  - Pt normotensive in the ED - Per pt, BP reportedly elevated at home and urgent care  - Pt normotensive in the ED  -will hold Valsartan since we are starting cardizem and her BP is only 145/79 as to avoid hypotension. - Per pt, BP reportedly elevated at home and urgent care  - Pt normotensive in the ED  - Takes Valsartan 320mg at home, will hold Valsartan at this time since we are starting Cardizem and her BP is only 145/79 so as to avoid hypotension  - Monitor routine hemodynamics

## 2020-04-14 NOTE — H&P ADULT - PROBLEM SELECTOR PLAN 9
- Medication list provided directly by patient  - Unable to reconcile meds with pharmacy as [facility] was closed at time of admission  - Day team to confirm meds with pharmacy

## 2020-04-14 NOTE — H&P ADULT - HISTORY OF PRESENT ILLNESS
CHARTING IN PROGRESS    In the ED  VS: T 97.9  ->101 /84 RR 18 SpO2 97% on 3L NC  Significant labs include WBC 12.7, NLR   CXR:  EKG:    Patient received 65yo F, with HTN, asthma, hypothyroidism, GERD, h/o multiple spinal surgeries, c/o a racing heart and elevated blood pressure (190/110 measured at home) that began at 1pm today. She presented initially to an urgent care where her BP remained elevated, and she was found to be rapid atrial fibrillation. Typically, her BP is well-controlled with valsartan. Additionally, she felt chest tightness described as someone as sitting on her chest, as well as headache and SOB though she attributes this to her chronic, severe asthma. She denies any personal h/o cardiac disease but notes that her father and one of her brothers have suffered an MI.    In the ED, CXR was performed and noted a questionable L base infiltrate. The patient reports the following history:    The date the pt first felt unwell: patient denies new symptoms aside from her chronic asthma.  Fever or chills: yes [  ]   no [ x ]   - Tmax:   Fatigue, malaise or generalized weakness: yes [  ]   no [ x ]  Shortness of breath/dyspnea: yes [ x, chronic asthma ]   no [  ]  Cough: yes [  ]   no [ x ], sputum production: yes [  ]   no [ x ]  Anorexia/po intolerance: yes [  ]   no [ x ]  Chest pain or chest tightness: yes [ x ]   no [  ]  Myalgias: yes [  ]   no [ x ]  Headache: yes [ x ]   no [  ]  Sore throat: yes [  ]   no [ x ]  Rhinorrhea: yes [  ]   no [ x ]  Abd pain: yes [  ]   no [ x ]  Nausea: yes [  ]   no [ x ]  Vomiting: yes [  ]   no [ x ]  Diarrhea: yes [  ]   no [ x ]  Skin rashes: yes [  ]   no [ x ]  Loss of sense of smell/anosmia: yes [  ]   no [ x ]  Conjunctivitis: yes [  ]   no [ x ]  Recent travel: yes [  ]   no [ x ] - Location:   Any sick contacts: yes [  ]   no [ x ]  Close contact with someone confirmed positive with COVID-19 / SARS-CoV2 in the last 14 days: yes [  ]   no [ x ]  Code status: FULL CODE      In the ED  VS: T 97.9  ->101 /84 RR 18 SpO2 97% on 3L NC  Significant labs include WBC 12.7 and NLR 2.51.  CXR: Questionable left base infiltrate.  EKG: atrial fibrillation, vent rate 145    Patient received Tylenol 650mg x1, Azithromycin 500mg x1, Cardizem IVP 10mg x2, Cardizem PO 30mg x1, and 1L NS. Cardio Dr. Burger consulted. 63yo F, with HTN, asthma, hypothyroidism, GERD, h/o multiple spinal surgeries, c/o a racing heart and elevated blood pressure (190/110 measured at home) that began at 1pm today. She presented initially to an urgent care where her BP remained elevated, and she was found to be rapid atrial fibrillation. Typically, her BP is well-controlled with valsartan. Additionally, she felt chest tightness described as someone as sitting on her chest, as well as headache and SOB though she attributes this to her chronic, severe asthma. She denies any personal h/o cardiac disease but notes that her father and one of her brothers have suffered an MI. She denies radiating chest pain/tightness, diaphoresis.    In the ED, CXR was performed and noted a questionable L base infiltrate. The patient reports the following history:    The date the pt first felt unwell: patient denies new symptoms aside from her chronic asthma.  Fever or chills: yes [  ]   no [ x ]   - Tmax:   Fatigue, malaise or generalized weakness: yes [  ]   no [ x ]  Shortness of breath/dyspnea: yes [ x, chronic asthma ]   no [  ]  Cough: yes [  ]   no [ x ], sputum production: yes [  ]   no [ x ]  Anorexia/po intolerance: yes [  ]   no [ x ]  Chest pain or chest tightness: yes [ x ]   no [  ]  Myalgias: yes [  ]   no [ x ]  Headache: yes [ x ]   no [  ]  Sore throat: yes [  ]   no [ x ]  Rhinorrhea: yes [  ]   no [ x ]  Abd pain: yes [  ]   no [ x ]  Nausea: yes [  ]   no [ x ]  Vomiting: yes [  ]   no [ x ]  Diarrhea: yes [  ]   no [ x ]  Skin rashes: yes [  ]   no [ x ]  Loss of sense of smell/anosmia: yes [  ]   no [ x ]  Conjunctivitis: yes [  ]   no [ x ]  Recent travel: yes [  ]   no [ x ] - Location:   Any sick contacts: yes [  ]   no [ x ]  Close contact with someone confirmed positive with COVID-19 / SARS-CoV2 in the last 14 days: yes [  ]   no [ x ]  Code status: FULL CODE      In the ED  VS: T 97.9  ->101 /84 RR 18 SpO2 97% on 3L NC  Significant labs include WBC 12.7 and NLR 2.51.  CXR: Questionable left base infiltrate.  EKG: atrial fibrillation, vent rate 145    Patient received Tylenol 650mg x1, Azithromycin 500mg x1, Cardizem IVP 10mg x2, Cardizem PO 30mg x1, and 1L NS. Cardio Dr. Burger consulted.

## 2020-04-14 NOTE — H&P ADULT - PROBLEM SELECTOR PLAN 3
- H/o of chronic asthma, reportedly "severe" according to patient  - Pt states she uses an inhaler obtained in [place] that is "similar to Symbicort"  - Pt informed to bring in inhaler from home if possible, otherwise may continue Symbicort while inpatient - f/u ariana glynn - CXR with questionable L base infiltrate  - Pt currently denying new respiratory symptoms aside from chronic asthma  - Given pt's lack of symptoms, doubt underlying pulmonary infectious process at this time  - S/p Azithromycin 500mg x1 in the ED, hold off on further abx therapy for now  - F/u procal; monitor daily temperature curve and CBC w/ diff

## 2020-04-14 NOTE — H&P ADULT - PROBLEM SELECTOR PLAN 6
- Chronic, stable  - Takes Prilosec at home, continue with daily PPI therapeutic interchange - H/o spinal stenosis s/p multiple cervical and lower back surgeries  - Takes Lyrica 100mg BID at home, continue while inpatient with hold parameter for respiratory distress  - Pt additionally takes Tramadol 50mg TID prn at home, while inpatient manage pain with Tylenol 650mg PO q6h prn for mild pain and Tramadol 25mg PO q6h prn for moderate pain with hold parameter for resp depression or hypotension

## 2020-04-14 NOTE — H&P ADULT - PROBLEM SELECTOR PLAN 2
- CXR with questionable L base infiltrate, pt currently denying new respiratory sx's other than chronic asthma but in light of current health climate, will r/o COVID PNA  - NLR 2.51, currently at low risk for decompensation, trend daily  - May use O2 NC, Venturi Mask, NRB as needed depending on oxygen demand  - Avoid HFNC/NIPPV/aerosolizing procedures i.e. nebulizations  - Avoid NSAIDs, use tylenol PRN fevers >101.3  - Maintain strict isolation precautions, use proper PPE  - Daily CBC w/ diff to follow eosinophils, lymphocytes and neutrophils; daily CK and d-dimer  - Given risk factors (age and comorbidities): check CRP, ferritin, and procalcitionin  - NEWS2 score 5, placing pt at medium risk though largely influenced by tachycardia which is d/t pt's new onset rapid afib  - Monitor respiratory status closely  - Code Status: FULL CODE

## 2020-04-14 NOTE — H&P ADULT - PROBLEM SELECTOR PLAN 8
IMPROVE VTE Individual Risk Assessment          RISK                                                          Points  [  ] Previous VTE                                                3  [  ] Thrombophilia                                             2  [  ] Lower limb paralysis                                   2        (unable to hold up >15 seconds)    [  ] Current Cancer                                             2         (within 6 months)  [  ] Immobilization > 24 hrs                              1  [  ] ICU/CCU stay > 24 hours                             1  [ x ] Age > 60                                                         1    IMPROVE VTE Score: 1    - DVT ppx with - Chronic, stable  - Takes Prilosec at home, continue with daily PPI therapeutic interchange

## 2020-04-14 NOTE — H&P ADULT - ASSESSMENT
65yo F, with HTN, asthma, hypothyroidism, GERD, h/o multiple spinal surgeries, c/o a racing heart and elevated blood pressure, admitted for rapid afib and CXR abnormality, r/o COVID.

## 2020-04-14 NOTE — ED PROVIDER NOTE - CONSULTANT FREE TEXT FOR MDM DISCUSSED CASE WITH QUESTION
Outpatient Speech Therapy    [x] [place]  Phone: [phone]  Fax: [phone]      [] [facility]  Phone: [phone]  Fax: [phone] THERAPY DAILY PROGRESS NOTE    Patient: Aurelio Shone     History Number: [id number]  Age: 10 y.o.      : 2014     PCP: Lyndsay Aguilar DO    Onset date: 02/10/2015  Referring doctor: Dr. Eri Fox  Diagnosis:   1. Expressive language disorder   2. Speech delay             Precautions:  universal     Date: 2018     Time in:   03:42 PM   Visit:   Ada Kumar  [place]   Time out:   04:55 PM  Total Visits: 120  Insurance information:  [employer] 60 visits/year (effective date 16), [facility] [address] 30 visits/calendar year  Plan of care signed (Y/N): n  Next re-certification due by:  2018     PAIN  [x]No     []Yes       Location: N/A   Pain Rating (0-10 pain scale): 0  Pain Description: N/A        Subjective report:     Na Mendoza was brought to therapy this date by his mother, who remained in the waiting room with Maycol's brother. Na Mendoza was seen after PT this date. He was focused and compliant with all activities. Goal 1: Na Mendoza will produce CVC2V2 syllables in imitation only with 80% accuracy. Spontaneously: 102/125=82%  In imitation: 115/125=92%   In imitation with cues: 124/125=99%     Goal 2: Na Mendoza will produce CVC1 syllables in imitation only with 80% accuracy. Spontaneously: 42/50=84%  In Imitation: 46/50=92%  In imitation with cues: 50/97=261%   Goal 3: Na Mendoza will use SDG to generate adj. + noun during structured tasks in 8/10x trials. NA-goal not addressed this date   Goal 4: Na Mendoza will use SGD to generate sentence Noun/pronoun + is/are + verb-ing in 4/5 trials. 0/5=0% independently  100% with moderate prompts   Goal 5: Na Mendoza will produce CVCV2 syllables in imitation only with 80% accuracy.  Spontaneously: 33/45=73%  In Imitation: 34/45=76%  In imitation with cues: 44/45=98%       Patient education/  home program         New Education provided to patient/ family/ caregiver   [] Yes              [x] No   Comments:    Continued review of prior education:  Continue to provide choice and have Jade Salguero verbalize his choice before giving it to him   Explore new vocabulary on SGD  Method of Education:   [x] Discussion     [x] Demonstration    [] Written     [] Other    Evaluation of Patients Response to Education:        [x] Patient and/or Caregiver verbalized understanding  [] Patient and/or Caregiver demonstrated without assistance  [] Patient and/or Caregiver demonstrated with assistance  [] Needs additional instruction to demonstrate understanding of education     Treatment/Response:               Patient tolerated todays treatment session:   [x] Good         []  Fair         []  Poor    Limitations/ difficulties with treatment session due to:          []Attention      []Pain             []Fatigue       []Other medical complications              []Other:                   Comments:     Plan/Goals:     [x]  Continue with current plan of care  []  Medical [facility]  [] [facility] per patient request  []  Change Treatment plan:    Next appointment scheduled for 07/20/18     Timed Based:  [] Cognitive Skills (96048)    []  Speech Generating Device Evaluation (1st hour) (90509)    []  Speech Generating Device Evaluation (Each additional 30 minutes) (43309)      Speech:  [x] Speech individual (79217)     [] Swallow/oral function treatment (67246)    [] Communication device modification (92749)        Speech evaluations :  [] Eval speech fluency (73772)     [] Eval Sound Production (04772)    [] Eval Sound Production, Language Comprehension and Expression (38190)     [] Behavioral & quantitative analysis of voice and resonance (32879)    [] Evaluation of voice prosthetic device (56717)    [] Evaluation of oral and pharyngeal swallow function (95863)    [] MBSS (46703)           Electronically signed by:       Chris Vargas MS, dr landry cardio

## 2020-04-14 NOTE — ED PROVIDER NOTE - PROGRESS NOTE DETAILS
pt still in rapid a fib will give another dose of cardizem 10MG IV and . cardio dr landry consulted will re-eval pt hr 112 afib. dr landry advised admit pt. pt hr 112 afib. dr landry advised admit pt. will admit to dr nicole hospitalist. spoke to daughter amaury who agrees with plan pt seen and examined on arrival.  BIBA from  with new onset afib with RVR.  pt c/o palpitations, chest pressure.  no LH, no n/v/diaphoresis.  on exam irreg irreg.  CTABL.  abd soft NT ND.  pt denies recent illness. no f/c no URI symptoms, no sick contacts. pt treated with dilt IV push and PO.  cardiology recommends admission for further care and evaluation.  MD Brooke

## 2020-04-15 ENCOUNTER — TRANSCRIPTION ENCOUNTER (OUTPATIENT)
Age: 65
End: 2020-04-15

## 2020-04-15 VITALS
HEART RATE: 62 BPM | RESPIRATION RATE: 18 BRPM | DIASTOLIC BLOOD PRESSURE: 60 MMHG | SYSTOLIC BLOOD PRESSURE: 142 MMHG | OXYGEN SATURATION: 97 %

## 2020-04-15 LAB
ALBUMIN SERPL ELPH-MCNC: 3.1 G/DL — LOW (ref 3.3–5)
ALP SERPL-CCNC: 76 U/L — SIGNIFICANT CHANGE UP (ref 40–120)
ALT FLD-CCNC: 29 U/L — SIGNIFICANT CHANGE UP (ref 12–78)
ANION GAP SERPL CALC-SCNC: 6 MMOL/L — SIGNIFICANT CHANGE UP (ref 5–17)
AST SERPL-CCNC: 35 U/L — SIGNIFICANT CHANGE UP (ref 15–37)
BASOPHILS # BLD AUTO: 0.02 K/UL — SIGNIFICANT CHANGE UP (ref 0–0.2)
BASOPHILS NFR BLD AUTO: 0.2 % — SIGNIFICANT CHANGE UP (ref 0–2)
BILIRUB SERPL-MCNC: 0.4 MG/DL — SIGNIFICANT CHANGE UP (ref 0.2–1.2)
BUN SERPL-MCNC: 8 MG/DL — SIGNIFICANT CHANGE UP (ref 7–23)
CALCIUM SERPL-MCNC: 9 MG/DL — SIGNIFICANT CHANGE UP (ref 8.5–10.1)
CHLORIDE SERPL-SCNC: 109 MMOL/L — HIGH (ref 96–108)
CK SERPL-CCNC: 231 U/L — HIGH (ref 26–192)
CO2 SERPL-SCNC: 28 MMOL/L — SIGNIFICANT CHANGE UP (ref 22–31)
CREAT SERPL-MCNC: 0.68 MG/DL — SIGNIFICANT CHANGE UP (ref 0.5–1.3)
CRP SERPL-MCNC: 0.38 MG/DL — SIGNIFICANT CHANGE UP (ref 0–0.4)
D DIMER BLD IA.RAPID-MCNC: 289 NG/ML DDU — HIGH
EOSINOPHIL # BLD AUTO: 0.04 K/UL — SIGNIFICANT CHANGE UP (ref 0–0.5)
EOSINOPHIL NFR BLD AUTO: 0.4 % — SIGNIFICANT CHANGE UP (ref 0–6)
FERRITIN SERPL-MCNC: 24 NG/ML — SIGNIFICANT CHANGE UP (ref 15–150)
GLUCOSE SERPL-MCNC: 101 MG/DL — HIGH (ref 70–99)
HCT VFR BLD CALC: 37.1 % — SIGNIFICANT CHANGE UP (ref 34.5–45)
HCV AB S/CO SERPL IA: 0.34 S/CO — SIGNIFICANT CHANGE UP (ref 0–0.99)
HCV AB SERPL-IMP: SIGNIFICANT CHANGE UP
HGB BLD-MCNC: 12.2 G/DL — SIGNIFICANT CHANGE UP (ref 11.5–15.5)
IMM GRANULOCYTES NFR BLD AUTO: 0.3 % — SIGNIFICANT CHANGE UP (ref 0–1.5)
LYMPHOCYTES # BLD AUTO: 2.54 K/UL — SIGNIFICANT CHANGE UP (ref 1–3.3)
LYMPHOCYTES # BLD AUTO: 28.1 % — SIGNIFICANT CHANGE UP (ref 13–44)
MAGNESIUM SERPL-MCNC: 1.9 MG/DL — SIGNIFICANT CHANGE UP (ref 1.6–2.6)
MCHC RBC-ENTMCNC: 29.5 PG — SIGNIFICANT CHANGE UP (ref 27–34)
MCHC RBC-ENTMCNC: 32.9 GM/DL — SIGNIFICANT CHANGE UP (ref 32–36)
MCV RBC AUTO: 89.8 FL — SIGNIFICANT CHANGE UP (ref 80–100)
MONOCYTES # BLD AUTO: 0.54 K/UL — SIGNIFICANT CHANGE UP (ref 0–0.9)
MONOCYTES NFR BLD AUTO: 6 % — SIGNIFICANT CHANGE UP (ref 2–14)
NEUTROPHILS # BLD AUTO: 5.88 K/UL — SIGNIFICANT CHANGE UP (ref 1.8–7.4)
NEUTROPHILS NFR BLD AUTO: 65 % — SIGNIFICANT CHANGE UP (ref 43–77)
NRBC # BLD: 0 /100 WBCS — SIGNIFICANT CHANGE UP (ref 0–0)
PHOSPHATE SERPL-MCNC: 3.9 MG/DL — SIGNIFICANT CHANGE UP (ref 2.5–4.5)
PLATELET # BLD AUTO: 409 K/UL — HIGH (ref 150–400)
POTASSIUM SERPL-MCNC: 4.2 MMOL/L — SIGNIFICANT CHANGE UP (ref 3.5–5.3)
POTASSIUM SERPL-SCNC: 4.2 MMOL/L — SIGNIFICANT CHANGE UP (ref 3.5–5.3)
PROT SERPL-MCNC: 6.7 G/DL — SIGNIFICANT CHANGE UP (ref 6–8.3)
RBC # BLD: 4.13 M/UL — SIGNIFICANT CHANGE UP (ref 3.8–5.2)
RBC # FLD: 13 % — SIGNIFICANT CHANGE UP (ref 10.3–14.5)
SARS-COV-2 RNA SPEC QL NAA+PROBE: SIGNIFICANT CHANGE UP
SODIUM SERPL-SCNC: 143 MMOL/L — SIGNIFICANT CHANGE UP (ref 135–145)
TSH SERPL-MCNC: 2.31 UIU/ML — SIGNIFICANT CHANGE UP (ref 0.36–3.74)
WBC # BLD: 9.05 K/UL — SIGNIFICANT CHANGE UP (ref 3.8–10.5)
WBC # FLD AUTO: 9.05 K/UL — SIGNIFICANT CHANGE UP (ref 3.8–10.5)

## 2020-04-15 PROCEDURE — 87635 SARS-COV-2 COVID-19 AMP PRB: CPT

## 2020-04-15 PROCEDURE — 85379 FIBRIN DEGRADATION QUANT: CPT

## 2020-04-15 PROCEDURE — 36415 COLL VENOUS BLD VENIPUNCTURE: CPT

## 2020-04-15 PROCEDURE — 80053 COMPREHEN METABOLIC PANEL: CPT

## 2020-04-15 PROCEDURE — 82550 ASSAY OF CK (CPK): CPT

## 2020-04-15 PROCEDURE — 84100 ASSAY OF PHOSPHORUS: CPT

## 2020-04-15 PROCEDURE — 84484 ASSAY OF TROPONIN QUANT: CPT

## 2020-04-15 PROCEDURE — 71045 X-RAY EXAM CHEST 1 VIEW: CPT

## 2020-04-15 PROCEDURE — 86803 HEPATITIS C AB TEST: CPT

## 2020-04-15 PROCEDURE — 96376 TX/PRO/DX INJ SAME DRUG ADON: CPT

## 2020-04-15 PROCEDURE — 99239 HOSP IP/OBS DSCHRG MGMT >30: CPT

## 2020-04-15 PROCEDURE — 96374 THER/PROPH/DIAG INJ IV PUSH: CPT

## 2020-04-15 PROCEDURE — 94640 AIRWAY INHALATION TREATMENT: CPT

## 2020-04-15 PROCEDURE — 93005 ELECTROCARDIOGRAM TRACING: CPT

## 2020-04-15 PROCEDURE — 81003 URINALYSIS AUTO W/O SCOPE: CPT

## 2020-04-15 PROCEDURE — 85027 COMPLETE CBC AUTOMATED: CPT

## 2020-04-15 PROCEDURE — 82728 ASSAY OF FERRITIN: CPT

## 2020-04-15 PROCEDURE — 96375 TX/PRO/DX INJ NEW DRUG ADDON: CPT

## 2020-04-15 PROCEDURE — 83735 ASSAY OF MAGNESIUM: CPT

## 2020-04-15 PROCEDURE — 99285 EMERGENCY DEPT VISIT HI MDM: CPT | Mod: 25

## 2020-04-15 PROCEDURE — 84443 ASSAY THYROID STIM HORMONE: CPT

## 2020-04-15 PROCEDURE — 86140 C-REACTIVE PROTEIN: CPT

## 2020-04-15 PROCEDURE — 84145 PROCALCITONIN (PCT): CPT

## 2020-04-15 RX ORDER — DILTIAZEM HCL 120 MG
1 CAPSULE, EXT RELEASE 24 HR ORAL
Qty: 30 | Refills: 0
Start: 2020-04-15 | End: 2020-05-14

## 2020-04-15 RX ORDER — DILTIAZEM HCL 120 MG
120 CAPSULE, EXT RELEASE 24 HR ORAL DAILY
Refills: 0 | Status: DISCONTINUED | OUTPATIENT
Start: 2020-04-15 | End: 2020-04-15

## 2020-04-15 RX ORDER — VALSARTAN 80 MG/1
320 TABLET ORAL EVERY 24 HOURS
Refills: 0 | Status: DISCONTINUED | OUTPATIENT
Start: 2020-04-15 | End: 2020-04-15

## 2020-04-15 RX ORDER — ATORVASTATIN CALCIUM 80 MG/1
80 TABLET, FILM COATED ORAL AT BEDTIME
Refills: 0 | Status: DISCONTINUED | OUTPATIENT
Start: 2020-04-15 | End: 2020-04-15

## 2020-04-15 RX ADMIN — Medication 50 MICROGRAM(S): at 06:44

## 2020-04-15 RX ADMIN — PANTOPRAZOLE SODIUM 40 MILLIGRAM(S): 20 TABLET, DELAYED RELEASE ORAL at 08:24

## 2020-04-15 RX ADMIN — ENOXAPARIN SODIUM 75 MILLIGRAM(S): 100 INJECTION SUBCUTANEOUS at 06:44

## 2020-04-15 RX ADMIN — BUDESONIDE AND FORMOTEROL FUMARATE DIHYDRATE 2 PUFF(S): 160; 4.5 AEROSOL RESPIRATORY (INHALATION) at 08:24

## 2020-04-15 RX ADMIN — TRAMADOL HYDROCHLORIDE 25 MILLIGRAM(S): 50 TABLET ORAL at 06:44

## 2020-04-15 RX ADMIN — TRAMADOL HYDROCHLORIDE 25 MILLIGRAM(S): 50 TABLET ORAL at 00:09

## 2020-04-15 RX ADMIN — Medication 100 MILLIGRAM(S): at 00:09

## 2020-04-15 NOTE — PROGRESS NOTE ADULT - PROBLEM SELECTOR PLAN 1
new onset afib w/ RVR  - Pt reports with no known personal h/o cardiac disease or arrhythmia  - rates are now well controlled  -   - CHADSVASC score 3, will initiate twice daily 1mg/kg ovenox  - Cardio Dr. Burger consulted, f/u recs  - Start PO cardizem 30mg q8h with hold parameters  - Monitor routine hemodynamics

## 2020-04-15 NOTE — DISCHARGE NOTE PROVIDER - NSDCMRMEDTOKEN_GEN_ALL_CORE_FT
budesonide-formoterol 160 mcg-4.5 mcg/inh inhalation aerosol: 2 puff(s) inhaled 2 times a day  Crestor 20 mg oral tablet: 1 tab(s) orally once a day  diazePAM 5 mg oral tablet: 1 tab(s) orally once a day (at bedtime), As Needed  dilTIAZem 120 mg/24 hours oral capsule, extended release: 1 cap(s) orally once a day   levothyroxine 50 mcg (0.05 mg) oral tablet: 1 tab(s) orally once a day  pregabalin 100 mg oral capsule: 1 cap(s) orally 2 times a day  PriLOSEC 20 mg oral delayed release capsule: 1 cap(s) orally once a day  traMADol 50 mg oral tablet: 1 tab(s) orally 3 times a day, As Needed  valsartan 320 mg oral tablet: 1 tab(s) orally once a day

## 2020-04-15 NOTE — DISCHARGE NOTE PROVIDER - HOSPITAL COURSE
pt   admitted with paroxysmal At Fib. Now NSR.    No clinical signs or symptoms Pneumonia , covid neg .      Atrial fibrillation, new onset.  started cardiazem tolerated well , rate controlled  , on asa 81 mg daily .    cardio   fu dr landry.  Asthma.  : continue inh  as needed .     Hypothyroidism - on meds      HTN (hypertension). - on meds stable     medically stable clear by cardio dr gris weiss up out pt .     physical exam   4-15-20  day of dc    Vital Signs Last 24 Hrs    T(C): 36.7 (15 Apr 2020 06:43), Max: 36.8 (14 Apr 2020 22:06)    T(F): 98.1 (15 Apr 2020 06:43), Max: 98.2 (14 Apr 2020 22:06)    HR: 60 (15 Apr 2020 08:25) (57 - 148)    BP: 144/63 (15 Apr 2020 08:25) (124/84 - 147/108)    BP(mean): --    RR: 17 (15 Apr 2020 08:25) (16 - 18)    SpO2: 98% (15 Apr 2020 08:25) (97% - 100%) GEN no distress , HEENT nt/nc , perrla , CVS s1s2 no tachy , CHEST bl air entery +  ,   CNS aao/3  , no focal deficit     , GI soft , bs present  , EXT no edema, pedal pulse present , SKIN no rash.    total time spend 50 min.

## 2020-04-15 NOTE — PROGRESS NOTE ADULT - ASSESSMENT
Pt with paroxysmal At Fib. Now NSR.  No clinical signs or symptoms Pneumonia.   Advised see me in office next week.  CATHY Burger.

## 2020-04-15 NOTE — ED ADULT NURSE REASSESSMENT NOTE - NS ED NURSE REASSESS COMMENT FT1
pt verbalizes she does not want to stay for admission.  pt states she is worried about getting covid.  called hospitalist and resident to speak with pt.  pt a&ox3 wants to sign ama
resident s/w pt, convinced to stay.  pt medicated with lyrica and tramadol as ordered.  awaiting bed assignment.
Patient and report received at 0710.  patient is alert and oriented x 4.  She is sitting on stretcher, respirations are even and unlabored, skin is warm and dry.  She is in AFIB on the monitor, rate controlled.  Telephone is within reach.  Breakfast tray provided.

## 2020-04-15 NOTE — PROGRESS NOTE ADULT - SUBJECTIVE AND OBJECTIVE BOX
PULMONARY/CRITICAL CARE      INTERVAL HPI/OVERNIGHT EVENTS:  Pt well known to me admitted for PAF. Now NSR. No cough,, fever, chills sweats.   64y FemaleHPI:  63yo F, with HTN, asthma, hypothyroidism, GERD, h/o multiple spinal surgeries, c/o a racing heart and elevated blood pressure (190/110 measured at home) that began at 1pm today. She presented initially to an urgent care where her BP remained elevated, and she was found to be rapid atrial fibrillation. Typically, her BP is well-controlled with valsartan. Additionally, she felt chest tightness described as someone as sitting on her chest, as well as headache and SOB though she attributes this to her chronic, severe asthma. She denies any personal h/o cardiac disease but notes that her father and one of her brothers have suffered an MI. She denies radiating chest pain/tightness, diaphoresis.    In the ED, CXR was performed and noted a questionable L base infiltrate. The patient reports the following history:    The date the pt first felt unwell: patient denies new symptoms aside from her chronic asthma.  Fever or chills: yes [  ]   no [ x ]   - Tmax:   Fatigue, malaise or generalized weakness: yes [  ]   no [ x ]  Shortness of breath/dyspnea: yes [ x, chronic asthma ]   no [  ]  Cough: yes [  ]   no [ x ], sputum production: yes [  ]   no [ x ]  Anorexia/po intolerance: yes [  ]   no [ x ]  Chest pain or chest tightness: yes [ x ]   no [  ]  Myalgias: yes [  ]   no [ x ]  Headache: yes [ x ]   no [  ]  Sore throat: yes [  ]   no [ x ]  Rhinorrhea: yes [  ]   no [ x ]  Abd pain: yes [  ]   no [ x ]  Nausea: yes [  ]   no [ x ]  Vomiting: yes [  ]   no [ x ]  Diarrhea: yes [  ]   no [ x ]  Skin rashes: yes [  ]   no [ x ]  Loss of sense of smell/anosmia: yes [  ]   no [ x ]  Conjunctivitis: yes [  ]   no [ x ]  Recent travel: yes [  ]   no [ x ] - Location:   Any sick contacts: yes [  ]   no [ x ]  Close contact with someone confirmed positive with COVID-19 / SARS-CoV2 in the last 14 days: yes [  ]   no [ x ]  Code status: FULL CODE      In the ED  VS: T 97.9  ->101 /84 RR 18 SpO2 97% on 3L NC  Significant labs include WBC 12.7 and NLR 2.51.  CXR: Questionable left base infiltrate.  EKG: atrial fibrillation, vent rate 145    Patient received Tylenol 650mg x1, Azithromycin 500mg x1, Cardizem IVP 10mg x2, Cardizem PO 30mg x1, and 1L NS. Cardio Dr. Burger consulted. (2020 19:26)        PAST MEDICAL & SURGICAL HISTORY:  Hypothyroidism  Spinal stenosis  GERD (Gastroesophageal Reflux Disease)  Asthma: trigger getting a cold. hospitalized multipl  times last  denies intubation  Osteopenia  Cervical Disc Displacement: current diagnosis  Cervical vertebral fusion  S/P Colonoscopy:   wnl  S/P Foot Surgery: ORIF left foot  S/P Cholecystectomy: open     Kyphosis  Termination of Pregnancy: x3  remote        ICU Vital Signs Last 24 Hrs  T(C): 36.7 (15 Apr 2020 06:43), Max: 36.8 (2020 22:06)  T(F): 98.1 (15 Apr 2020 06:43), Max: 98.2 (2020 22:06)  HR: 60 (15 Apr 2020 08:25) (57 - 148)  BP: 144/63 (15 Apr 2020 08:25) (124/84 - 147/108)  BP(mean): --  ABP: --  ABP(mean): --  RR: 17 (15 Apr 2020 08:25) (16 - 18)  SpO2: 98% (15 Apr 2020 08:25) (97% - 100%)    Qtts:     I&O's Summary          REVIEW OF SYSTEMS:    CONSTITUTIONAL: No fever, weight loss, or fatigue  EYES: No eye pain, visual disturbances, or discharge  ENMT:  No difficulty hearing, tinnitus, vertigo; No sinus or throat pain  NECK: No pain or stiffness  BREASTS: No pain, masses, or nipple discharge  RESPIRATORY: No cough, wheezing, chills or hemoptysis; No shortness of breath  CARDIOVASCULAR: No chest pain, palpitations, dizziness, or leg swelling  GASTROINTESTINAL: No abdominal or epigastric pain. No nausea, vomiting, or hematemesis; No diarrhea or constipation. No melena or hematochezia.  GENITOURINARY: No dysuria, frequency, hematuria, or incontinence  NEUROLOGICAL: No headaches, memory loss, loss of strength, numbness, or tremors  SKIN: No itching, burning, rashes, or lesions   LYMPH NODES: No enlarged glands  ENDOCRINE: No heat or cold intolerance; No hair loss  MUSCULOSKELETAL: No joint pain or swelling; No muscle, back, or extremity pain, no calf tenderness  PSYCHIATRIC: No depression, anxiety, mood swings, or difficulty sleeping  HEME/LYMPH: No easy bruising, or bleeding gums  ALLERGY AND IMMUNOLOGIC: No hives or eczema      PHYSICAL EXAM:    GENERAL: NAD, well-groomed, well-developed, NAD  HEAD:  Atraumatic, Normocephalic  EYES: EOMI, PERRLA, conjunctiva and sclera clear  ENMT: No tonsillar erythema, exudates, or enlargement; Moist mucous membranes, Good dentition, No lesions  NECK: Supple, No JVD, Normal thyroid  NERVOUS SYSTEM:  Alert & Oriented X3, Good concentration; Motor Strength 5/5 B/L upper and lower extremities  CHEST/LUNG: Clear to percussion bilaterally; No rales, rhonchi, wheezing, or rubs  HEART: Regular rate and rhythm; No murmurs, rubs, or gallops  ABDOMEN: Soft, Nontender, Nondistended; Bowel sounds present  EXTREMITIES:  2+ Peripheral Pulses, No clubbing, cyanosis, or edema  LYMPH: No lymphadenopathy noted  SKIN: No rashes or lesions        LABS:                        12.2   9.05  )-----------( 409      ( 15 Apr 2020 06:48 )             37.1     04-15    143  |  109<H>  |  8   ----------------------------<  101<H>  4.2   |  28  |  0.68    Ca    9.0      15 Apr 2020 06:48  Phos  3.9     04-15  Mg     1.9     04-15    TPro  6.7  /  Alb  3.1<L>  /  TBili  0.4  /  DBili  x   /  AST  35  /  ALT  29  /  AlkPhos  76  04-15      Urinalysis Basic - ( 2020 17:56 )    Color: Yellow / Appearance: Clear / S.005 / pH: x  Gluc: x / Ketone: Negative  / Bili: Negative / Urobili: Negative   Blood: x / Protein: Negative / Nitrite: Negative   Leuk Esterase: Negative / RBC: x / WBC x   Sq Epi: x / Non Sq Epi: x / Bacteria: x        vanco through     RADIOLOGY & ADDITIONAL STUDIES:    < from: Xray Chest 1 View- PORTABLE-Urgent (20 @ 18:21) >  EXAM:  XR CHEST PORTABLE URGENT 1V                            PROCEDURE DATE:  2020          INTERPRETATION:  Chest one view    HISTORY: Chest pain    COMPARISON STUDY: January 15, 2018    Frontal expiratory view of the chest shows the heart to be borderline in size. The lungs show questionable small left base infiltrate and there is no evidence of pneumothorax nor pleural effusion. Cervical spine hardware is present.    IMPRESSION:  Questionable left base infiltrate. Follow-up study is recommended as clinically warranted.    Thank you for the courtesy of this referral.                OBIE RICHARDSON M.D., ATTENDING RADIOLOGIST  This document has been electronically signed. 2020  6:28PM              < end of copied text >    CRITICAL CARE TIME SPENT:

## 2020-04-15 NOTE — DISCHARGE NOTE PROVIDER - CARE PROVIDER_API CALL
Wil Ross (DO)  Family Medicine  [address], [address]  [place] [address]  Phone: [phone]  Fax: [phone]  Follow Up Time:     Chuck Burger)  Cardiovascular Disease; Internal Medicine  [address], [address]  [place] [address]  Phone: [phone]  Fax: [phone]  Follow Up Time:

## 2020-04-15 NOTE — PROGRESS NOTE ADULT - PROBLEM SELECTOR PLAN 3
- CXR with questionable L base infiltrate  - Pt currently denying new respiratory symptoms aside from chronic asthma  - Given pt's lack of symptoms, doubt underlying pulmonary infectious process at this time  - S/p Azithromycin 500mg x1 in the ED, hold off on further abx therapy for now  - procal negative, will monitor off abx for now

## 2020-04-15 NOTE — DISCHARGE NOTE NURSING/CASE MANAGEMENT/SOCIAL WORK - PATIENT PORTAL LINK FT
You can access the FollowMyHealth Patient Portal offered by [facility] by registering at the following website: [email or web address]. By joining [employer]’s FollowMyHealth portal, you will also be able to view your health information using other applications (apps) compatible with our system.

## 2020-04-15 NOTE — DISCHARGE NOTE PROVIDER - NSDCCPCAREPLAN_GEN_ALL_CORE_FT
PRINCIPAL DISCHARGE DIAGNOSIS  Diagnosis: Rapid atrial fibrillation  Assessment and Plan of Treatment: new / started on steve , abhishek landry office with in 1wk , fu up out pt echo . your covid was neg .

## 2020-04-15 NOTE — PROGRESS NOTE ADULT - PROBLEM SELECTOR PLAN 4
- H/o of chronic asthma, reportedly "severe" according to patient  - Pt states she uses an inhaler obtained in [place] that is "similar to Symbicort"  - Pt informed to bring in inhaler from home if possible, otherwise may continue Symbicort while inpatient

## 2020-04-15 NOTE — CONSULT NOTE ADULT - ASSESSMENT
The patient is a 64 year old female with a history of HTN, chronic pain, asthma who is admitted with atrial fibrillation.    Plan:  - Received diltiazem yesterday. Now back in sinus rhythm.  - Change diltiazem to diltiazem  mg daily  - Continue valsartan 320 mg daily  - While [id number] is 3, episode of atrial fibrillation was less than 12 hours in duration. Will discontinue anticoagulation for now and recommend outpatient loop recorder. If there is a recurrence of atrial fibrillation, then she will need to be started on long term anticoagulation.  - TSH pending  - Recommend echocardiogram, which can be done as outpatient  - COVID-19 pending due to questionable infiltrate on CXR  - Patient can be discharged from a cardiac perspective

## 2020-04-15 NOTE — PROGRESS NOTE ADULT - PROBLEM SELECTOR PLAN 5
- Per pt, BP reportedly elevated at home and urgent care   - Takes Valsartan 320mg at home, will hold Valsartan at this time since we are starting Cardizem and her BP is only 145/79 so as to avoid hypotension  - Monitor routine hemodynamics

## 2020-04-15 NOTE — PROGRESS NOTE ADULT - PROBLEM SELECTOR PLAN 2
- CXR with questionable L base infiltrate, pt currently denying new respiratory sx's other than chronic asthma but during this COVID-19 pandemic, will r/o COVID PNA  - NLR 2.31, currently at low risk for decompensation, trend daily  - May use O2 NC, Venturi Mask, NRB as needed depending on oxygen demand  - Avoid HFNC/NIPPV/aerosolizing procedures i.e. nebulizations  - Avoid NSAIDs, use tylenol PRN fevers >101.3  - Maintain strict isolation precautions, use proper PPE  - Daily CBC w/ diff to follow eosinophils, lymphocytes and neutrophils; daily CK and d-dimer  - Given risk factors (age and comorbidities): check CRP, ferritin, and procalcitonin  - Monitor respiratory status closely  - Code Status: FULL CODE

## 2020-04-15 NOTE — CONSULT NOTE ADULT - SUBJECTIVE AND OBJECTIVE BOX
History of Present Illness: The patient is a 64 year old female with a history of HTN, chronic pain, asthma who is admitted with atrial fibrillation. She states in the afternoon she had dizziness, palpitations, and substernal chest discomfort. She checked her BP and it was elevated to 200 systolic. She went to urgent care who found her to be in atrial fibrillation, so she was sent to ED. She denied cough, fevers, shortness of breath, nausea, diarrhea.    Past Medical/Surgical History:  HTN, chronic pain, asthma    Medications:  Home Medications:  budesonide-formoterol 160 mcg-4.5 mcg/inh inhalation aerosol: 2 puff(s) inhaled 2 times a day (14 Apr 2020 20:20)  diazePAM 5 mg oral tablet: 1 tab(s) orally once a day (at bedtime), As Needed (14 Apr 2020 20:22)  levothyroxine 50 mcg (0.05 mg) oral tablet: 1 tab(s) orally once a day (14 Apr 2020 20:22)  pregabalin 100 mg oral capsule: 1 cap(s) orally 2 times a day (14 Apr 2020 20:22)  PriLOSEC 20 mg oral delayed release capsule: 1 cap(s) orally once a day (14 Apr 2020 20:22)  traMADol 50 mg oral tablet: 1 tab(s) orally 3 times a day, As Needed (14 Apr 2020 20:22)  valsartan 320 mg oral tablet: 1 tab(s) orally once a day (14 Apr 2020 20:22)      Family History: Non-contributory family history of premature cardiovascular atherosclerotic disease    Social History: No tobacco, alcohol or drug use    Review of Systems:  General: No fevers, chills, weight loss or gain  Skin: No rashes, color changes  Cardiovascular: No chest pain, orthopnea  Respiratory: No shortness of breath, cough  Gastrointestinal: No nausea, abdominal pain  Genitourinary: No incontinence, pain with urination  Musculoskeletal: No pain, swelling, decreased range of motion  Neurological: No headache, weakness  Psychiatric: No depression, anxiety  Endocrine: No weight loss or gain, increased thirst  All other systems are comprehensively negative.    Physical Exam:  Vitals:        Vital Signs Last 24 Hrs  T(C): 36.7 (15 Apr 2020 06:43), Max: 36.8 (14 Apr 2020 22:06)  T(F): 98.1 (15 Apr 2020 06:43), Max: 98.2 (14 Apr 2020 22:06)  HR: 57 (15 Apr 2020 06:43) (57 - 148)  BP: 137/75 (15 Apr 2020 06:43) (124/84 - 147/108)  BP(mean): --  RR: 18 (15 Apr 2020 06:43) (16 - 18)  SpO2: 98% (15 Apr 2020 06:43) (97% - 100%)  General: NAD  HEENT: MMM  Neck: No JVD, no carotid bruit  Lungs: CTAB  CV: RRR, nl S1/S2, no M/R/G  Abdomen: S/NT/ND, +BS  Extremities: No LE edema, no cyanosis  Neuro: AAOx3, non-focal  Skin: No rash    Labs:                        12.2   9.05  )-----------( 409      ( 15 Apr 2020 06:48 )             37.1     04-15    143  |  109<H>  |  8   ----------------------------<  101<H>  4.2   |  28  |  0.68    Ca    9.0      15 Apr 2020 06:48  Phos  3.9     04-15  Mg     1.9     04-15    TPro  6.7  /  Alb  3.1<L>  /  TBili  0.4  /  DBili  x   /  AST  35  /  ALT  29  /  AlkPhos  76  04-15    CARDIAC MARKERS ( 15 Apr 2020 06:48 )  x     / x     / 231 U/L / x     / x      CARDIAC MARKERS ( 14 Apr 2020 17:49 )  <.015 ng/mL / x     / x     / x     / x              ECG: AF

## 2020-04-15 NOTE — PROGRESS NOTE ADULT - PROBLEM SELECTOR PLAN 9
IMPROVE VTE Individual Risk Assessment          RISK                                                          Points  [  ] Previous VTE                                                3  [  ] Thrombophilia                                             2  [  ] Lower limb paralysis                                   2        (unable to hold up >15 seconds)    [  ] Current Cancer                                             2         (within 6 months)  [  ] Immobilization > 24 hrs                              1  [  ] ICU/CCU stay > 24 hours                             1  [ x ] Age > 60                                                         1    IMPROVE VTE Score: 1    - BID therapeutic Lovenox

## 2020-04-15 NOTE — PROGRESS NOTE ADULT - PROBLEM SELECTOR PLAN 6
- H/o spinal stenosis s/p multiple cervical and lower back surgeries  - Takes Lyrica 100mg BID at home, continue while inpatient with hold parameter for respiratory distress  - Pt additionally takes Tramadol 50mg TID prn at home, while inpatient manage pain with Tylenol 650mg PO q6h prn for mild pain and Tramadol 25mg PO q6h prn for moderate pain with hold parameter for resp depression or hypotension

## 2020-04-15 NOTE — DISCHARGE NOTE PROVIDER - NSDCQMCOGNITION_NEU_ALL_CORE
Initial assessment completed with patient. HARPAL/ER f/u appt made for 1/14/25 with you--first available appt without PCP approval--pt declines sooner appt with partners. Pt confirms she follows [facility], Dr. Saunders--she will call ENT office, as she was told in ER that her appt was 1/07/25, not 1/17/25. Patient has met goals, no further outreach needed. Sent TE to office staff for sooner appt, per HARPAL protocol. Thank you!  Future Appointments 1/14/2025  2:40 PM    Margot Howard MD   [facility] 8               [facility] [place] 5/6/2025   4:00 PM    Kamari Charlton MD      [id number]        [facility] [place]   No difficulties

## 2020-04-15 NOTE — PROGRESS NOTE ADULT - ASSESSMENT
63yo F, with HTN, asthma, hypothyroidism, GERD, h/o multiple spinal surgeries, c/o a racing heart and elevated blood pressure, admitted for rapid afib and CXR abnormality, r/o COVID.

## 2020-04-29 ENCOUNTER — NON-APPOINTMENT (OUTPATIENT)
Age: 65
End: 2020-04-29

## 2020-04-29 ENCOUNTER — APPOINTMENT (OUTPATIENT)
Dept: CARDIOLOGY | Facility: CLINIC | Age: 65
End: 2020-04-29
Payer: MEDICARE

## 2020-04-29 VITALS
HEIGHT: 62 IN | BODY MASS INDEX: 30 KG/M2 | OXYGEN SATURATION: 100 % | SYSTOLIC BLOOD PRESSURE: 145 MMHG | WEIGHT: 163 LBS | DIASTOLIC BLOOD PRESSURE: 70 MMHG | HEART RATE: 67 BPM

## 2020-04-29 DIAGNOSIS — R07.9 CHEST PAIN, UNSPECIFIED: ICD-10-CM

## 2020-04-29 DIAGNOSIS — Z82.49 FAMILY HISTORY OF ISCHEMIC HEART DISEASE AND OTHER DISEASES OF THE CIRCULATORY SYSTEM: ICD-10-CM

## 2020-04-29 DIAGNOSIS — K21.9 GASTRO-ESOPHAGEAL REFLUX DISEASE W/OUT ESOPHAGITIS: ICD-10-CM

## 2020-04-29 DIAGNOSIS — R06.89 OTHER ABNORMALITIES OF BREATHING: ICD-10-CM

## 2020-04-29 PROCEDURE — 99205 OFFICE O/P NEW HI 60 MIN: CPT

## 2020-04-29 PROCEDURE — 93000 ELECTROCARDIOGRAM COMPLETE: CPT

## 2020-04-29 RX ORDER — PREGABALIN 100 MG/1
100 CAPSULE ORAL
Refills: 0 | Status: ACTIVE | COMMUNITY

## 2020-04-29 NOTE — REVIEW OF SYSTEMS
[Headache] : headache [Recent Weight Loss (___ Lbs)] : recent [unfilled] ~Ulb weight loss [Chest Pain] : chest pain [Dyspnea on exertion] : dyspnea during exertion [Palpitations] : palpitations [Hand Pain] : hand pain [Joint Pain] : joint pain [Knee Pain] : knee pain [Dizziness] : dizziness [Anxiety] : anxiety [Depression] : depression [Easy Bruising] : a tendency for easy bruising [Negative] : Genitourinary [Fever] : no fever [Chills] : no chills [Feeling Fatigued] : not feeling fatigued [Blurry Vision] : no blurred vision [Seeing Double (Diplopia)] : no diplopia [Skin: A Rash] : no rash: [Excessive Thirst] : no polydipsia [Easy Bleeding] : no tendency for easy bleeding

## 2020-04-29 NOTE — HISTORY OF PRESENT ILLNESS
[FreeTextEntry1] : I saw Hue Guzman in the office today for cardiac evaluation. She is a 64-year-old female who has been treated for asthma. She developed hypertension 6 months ago and has been on medication and doing fairly well. She was recently admitted to [facility] with symptoms of palpitation, dizziness, chest pain, and headache. She was found to be in rapid atrial fibrillation and converted to sinus rhythm with IV of diltiazem. She was sent home on diltiazem 120 mg once a day. She was given aspirin. She is a CHADSvas 2 was not given anticoagulation because it was felt that her atrial fibrillation was less than 12 hours in duration.\par \par She called me in the office that she was having more rapid heartbeats and we increased her diltiazem 280 mg once a day. Since that time the heart has been better but she has been getting episodes of dizziness, headache, and chest discomfort. The episodes are better than on admission to the hospital but she is still having them daily. His symptoms seem to be worse at night. When she checks her blood pressure at these times her blood pressure is high. You have been increasing her blood pressure medication without success.\par \par ECG today demonstrates sinus rhythm and appears normal.\par \par As above, she has a history of hypertension, hyperlipidemia, and asthma. She denies any history of diabetes she has a brother had a loki on infarction at age 40.

## 2020-04-29 NOTE — REASON FOR VISIT
[Initial Evaluation] : an initial evaluation of [Atrial Fibrillation] : atrial fibrillation [Hypertension] : hypertension [Chest Pain] : chest pain [Dyspnea] : dyspnea [FreeTextEntry1] : Asthma

## 2020-04-29 NOTE — PHYSICAL EXAM
[Normal Appearance] : normal appearance [General Appearance - Well Developed] : well developed [Well Groomed] : well groomed [General Appearance - Well Nourished] : well nourished [No Deformities] : no deformities [General Appearance - In No Acute Distress] : no acute distress [Normal Conjunctiva] : the conjunctiva exhibited no abnormalities [Normal Oral Mucosa] : normal oral mucosa [Normal Jugular Venous A Waves Present] : normal jugular venous A waves present [Normal Jugular Venous V Waves Present] : normal jugular venous V waves present [No Jugular Venous El A Waves] : no jugular venous el A waves [Not Palpable] : not palpable [Normal Rate] : normal [Normal S1] : normal S1 [Normal S2] : normal S2 [No Murmur] : no murmurs heard [2+] : left 2+ [1+] : left 1+ [No Abnormalities] : the abdominal aorta was not enlarged and no bruit was heard [No Pitting Edema] : no pitting edema present [Exaggerated Use Of Accessory Muscles For Inspiration] : no accessory muscle use [Respiration, Rhythm And Depth] : normal respiratory rhythm and effort [Auscultation Breath Sounds / Voice Sounds] : lungs were clear to auscultation bilaterally [Bowel Sounds] : normal bowel sounds [Abdomen Soft] : soft [Abdomen Tenderness] : non-tender [Abnormal Walk] : normal gait [Gait - Sufficient For Exercise Testing] : the gait was sufficient for exercise testing [Nail Clubbing] : no clubbing of the fingernails [Skin Color & Pigmentation] : normal skin color and pigmentation [Cyanosis, Localized] : no localized cyanosis [Skin Turgor] : normal skin turgor [] : no rash [Impaired Insight] : insight and judgment were intact [Oriented To Time, Place, And Person] : oriented to person, place, and time [No Anxiety] : not feeling anxious [S3] : no S3 [S4] : no S4 [Right Carotid Bruit] : no bruit heard over the right carotid [Left Carotid Bruit] : no bruit heard over the left carotid [Right Femoral Bruit] : no bruit heard over the right femoral artery [Left Femoral Bruit] : no bruit heard over the left femoral artery [FreeTextEntry1] : Minimal expiratory wheeze

## 2020-04-29 NOTE — DISCUSSION/SUMMARY
[FreeTextEntry1] : Today, the patient's blood pressure seems to be well controlled and she is in sinus rhythm. It is possible that some of her symptoms may be due to anxiety and depression. She had been treated Lexapro in the past which has been stopped. Before we embark on more medications we will do a nuclear stress test to rule out coronary disease and an echo. If we can determine that the patient's heart is good this may reduce her anxiety and maybe she would do better. Otherwise she may need medications for anxiety. In the meantime she can take a low dose Valium that she takes for muscle aches on an as-needed basis for overwhelming anxiety.\par \par This was discussed with the patient and her daughter in detail and  I answered their questions. We did go over hospitalization including medications and  blood work. We did discuss further evaluation and management.

## 2020-05-01 ENCOUNTER — APPOINTMENT (OUTPATIENT)
Dept: CARDIOLOGY | Facility: CLINIC | Age: 65
End: 2020-05-01
Payer: MEDICARE

## 2020-05-01 PROCEDURE — 78452 HT MUSCLE IMAGE SPECT MULT: CPT

## 2020-05-01 PROCEDURE — 93015 CV STRESS TEST SUPVJ I&R: CPT

## 2020-05-01 PROCEDURE — A9500: CPT

## 2020-05-12 ENCOUNTER — APPOINTMENT (OUTPATIENT)
Dept: CARDIOLOGY | Facility: CLINIC | Age: 65
End: 2020-05-12
Payer: MEDICARE

## 2020-05-12 PROCEDURE — 93306 TTE W/DOPPLER COMPLETE: CPT

## 2020-05-20 ENCOUNTER — APPOINTMENT (OUTPATIENT)
Dept: CARDIOLOGY | Facility: CLINIC | Age: 65
End: 2020-05-20
Payer: MEDICARE

## 2020-05-20 VITALS
DIASTOLIC BLOOD PRESSURE: 80 MMHG | BODY MASS INDEX: 30.18 KG/M2 | OXYGEN SATURATION: 99 % | WEIGHT: 164 LBS | HEIGHT: 62 IN | HEART RATE: 73 BPM | SYSTOLIC BLOOD PRESSURE: 167 MMHG

## 2020-05-20 PROCEDURE — 99215 OFFICE O/P EST HI 40 MIN: CPT

## 2020-05-20 NOTE — HISTORY OF PRESENT ILLNESS
[FreeTextEntry1] : I saw Hue Guzman in the office today for cardiac follow up. She is a 64-year-old female who has been treated for asthma. She developed hypertension 6 months ago and has been on medication and doing fairly well. She was recently admitted to [facility] 4/20 with symptoms of palpitation, dizziness, chest pain, and headache. She was found to be in rapid atrial fibrillation and converted to sinus rhythm with IV of diltiazem. She was sent home on diltiazem 120 mg once a day. She was given aspirin. She is a CHADSvas 2 was not given anticoagulation because it was felt that her atrial fibrillation was less than 12 hours in duration.\par \par She called me in the office that she was having more rapid heartbeats and we increased her diltiazem 280 mg once a day. Since that time the heart has been better but she has been getting episodes of dizziness, headache, and chest discomfort. The episodes are better than on admission to the hospital but she is still having them daily. Her symptoms seem to be worse at night. When she checks her blood pressure at these times her blood pressure is high. \par \par Patient had a chemical nuclear stress test 5/20 that was normal. EF 66%. Echo showed an ejection fraction of 60-65%. There was calcification of mitral annulus with minimal MR. There was mild AI and TR. There was moderate left atrial enlargement. LVH with stage I diastolic dysfunction.\par \par As above, she has a history of hypertension, hyperlipidemia, and asthma. She denies any history of diabetes she has a brother had a myocardial infarction at age 40.\par \par The patient is having surgeries in heart rate and blood pressure That occur only at night. It seems to occur more often if she eats something. Blood pressure during the day is better but still elevated.\par \par

## 2020-05-20 NOTE — REASON FOR VISIT
[Atrial Fibrillation] : atrial fibrillation [Initial Evaluation] : an initial evaluation of [Chest Pain] : chest pain [Dyspnea] : dyspnea [Hypertension] : hypertension [FreeTextEntry1] : Asthma

## 2020-05-20 NOTE — DISCUSSION/SUMMARY
[FreeTextEntry1] : The patient has a normal stress test echo. It is possible she is still having arrhythmia and she will wear a 14 day patch monitor. I am concerned that there may be an endocrine problem causing surges in her heart rate and blood pressure and she'll make an appointment with the endocrinologist.\par \par The meantime am increasing the diltiazem to 3 and 60 mg once a day.\par \par The patient will call me and let me know how she is doing. This was also discussed with her daughter and answered all their questions.

## 2020-05-20 NOTE — REVIEW OF SYSTEMS
[Headache] : headache [Recent Weight Loss (___ Lbs)] : recent [unfilled] ~Ulb weight loss [Dyspnea on exertion] : dyspnea during exertion [Chest Pain] : chest pain [Palpitations] : palpitations [Knee Pain] : knee pain [Hand Pain] : hand pain [Joint Pain] : joint pain [Depression] : depression [Dizziness] : dizziness [Anxiety] : anxiety [Easy Bruising] : a tendency for easy bruising [Negative] : Gastrointestinal [Fever] : no fever [Feeling Fatigued] : not feeling fatigued [Chills] : no chills [Blurry Vision] : no blurred vision [Seeing Double (Diplopia)] : no diplopia [Skin: A Rash] : no rash: [Easy Bleeding] : no tendency for easy bleeding [Excessive Thirst] : no polydipsia

## 2020-05-20 NOTE — PHYSICAL EXAM
[General Appearance - Well Developed] : well developed [Normal Appearance] : normal appearance [Well Groomed] : well groomed [General Appearance - Well Nourished] : well nourished [No Deformities] : no deformities [General Appearance - In No Acute Distress] : no acute distress [Normal Conjunctiva] : the conjunctiva exhibited no abnormalities [Normal Oral Mucosa] : normal oral mucosa [Normal Jugular Venous A Waves Present] : normal jugular venous A waves present [Normal Jugular Venous V Waves Present] : normal jugular venous V waves present [No Jugular Venous El A Waves] : no jugular venous el A waves [Auscultation Breath Sounds / Voice Sounds] : lungs were clear to auscultation bilaterally [Exaggerated Use Of Accessory Muscles For Inspiration] : no accessory muscle use [Respiration, Rhythm And Depth] : normal respiratory rhythm and effort [Bowel Sounds] : normal bowel sounds [Abdomen Tenderness] : non-tender [Abdomen Soft] : soft [Abnormal Walk] : normal gait [Nail Clubbing] : no clubbing of the fingernails [Gait - Sufficient For Exercise Testing] : the gait was sufficient for exercise testing [Skin Color & Pigmentation] : normal skin color and pigmentation [Cyanosis, Localized] : no localized cyanosis [] : no rash [Oriented To Time, Place, And Person] : oriented to person, place, and time [Skin Turgor] : normal skin turgor [No Anxiety] : not feeling anxious [Impaired Insight] : insight and judgment were intact [Not Palpable] : not palpable [Normal Rate] : normal [Normal S1] : normal S1 [Normal S2] : normal S2 [No Murmur] : no murmurs heard [2+] : left 2+ [1+] : left 1+ [No Abnormalities] : the abdominal aorta was not enlarged and no bruit was heard [No Pitting Edema] : no pitting edema present [FreeTextEntry1] : Minimal expiratory wheeze [S3] : no S3 [S4] : no S4 [Right Carotid Bruit] : no bruit heard over the right carotid [Right Femoral Bruit] : no bruit heard over the right femoral artery [Left Carotid Bruit] : no bruit heard over the left carotid [Left Femoral Bruit] : no bruit heard over the left femoral artery

## 2020-05-27 ENCOUNTER — APPOINTMENT (OUTPATIENT)
Dept: ENDOCRINOLOGY | Facility: CLINIC | Age: 65
End: 2020-05-27
Payer: MEDICARE

## 2020-05-27 VITALS
HEIGHT: 62 IN | DIASTOLIC BLOOD PRESSURE: 74 MMHG | WEIGHT: 164 LBS | OXYGEN SATURATION: 95 % | BODY MASS INDEX: 30.18 KG/M2 | HEART RATE: 74 BPM | SYSTOLIC BLOOD PRESSURE: 133 MMHG

## 2020-05-27 DIAGNOSIS — E03.9 HYPOTHYROIDISM, UNSPECIFIED: ICD-10-CM

## 2020-05-27 PROCEDURE — 99204 OFFICE O/P NEW MOD 45 MIN: CPT | Mod: 25

## 2020-05-27 PROCEDURE — 36415 COLL VENOUS BLD VENIPUNCTURE: CPT

## 2020-05-27 NOTE — REASON FOR VISIT
[Initial Evaluation] : an initial evaluation [Other___] : [unfilled] [Hypothyroidism] : hypothyroidism [FreeTextEntry2] : Sesar Fuchs

## 2020-05-27 NOTE — CONSULT LETTER
[Please see my note below.] : Please see my note below. [Consult Letter:] : I had the pleasure of evaluating your patient, [unfilled]. [Dear  ___] : Dear  [unfilled], [Sincerely,] : Sincerely, [Consult Closing:] : Thank you very much for allowing me to participate in the care of this patient.  If you have any questions, please do not hesitate to contact me. [FreeTextEntry3] : Angelia Guzman MS. DO.\par Endocrinology, Diabetes and Metabolism\par [address] [place] [address]\[place] [address]\par Tel [phone]\par Fax [phone]\par

## 2020-05-27 NOTE — REVIEW OF SYSTEMS
[Fatigue] : fatigue [Recent Weight Loss (___ Lbs)] : recent weight loss: [unfilled] lbs [Palpitations] : palpitations [Tremors] : tremors [Hot Flashes] : hot flashes [Negative] : Heme/Lymph

## 2020-05-27 NOTE — PHYSICAL EXAM
[Alert] : alert [Well Nourished] : well nourished [No Acute Distress] : no acute distress [Well Developed] : well developed [Normal Sclera/Conjunctiva] : normal sclera/conjunctiva [EOMI] : extra ocular movement intact [No Proptosis] : no proptosis [Normal Oropharynx] : the oropharynx was normal [Thyroid Not Enlarged] : the thyroid was not enlarged [No Thyroid Nodules] : no palpable thyroid nodules [No Respiratory Distress] : no respiratory distress [Clear to Auscultation] : lungs were clear to auscultation bilaterally [No Accessory Muscle Use] : no accessory muscle use [Normal S1, S2] : normal S1 and S2 [Normal Rate] : heart rate was normal [Regular Rhythm] : with a regular rhythm [Pedal Pulses Normal] : the pedal pulses are present [No Edema] : no peripheral edema [Not Tender] : non-tender [Normal Bowel Sounds] : normal bowel sounds [Not Distended] : not distended [Soft] : abdomen soft [Normal Anterior Cervical Nodes] : no anterior cervical lymphadenopathy [No Spinal Tenderness] : no spinal tenderness [Spine Straight] : spine straight [Normal Posterior Cervical Nodes] : no posterior cervical lymphadenopathy [Normal Gait] : normal gait [No Stigmata of Cushings Syndrome] : no stigmata of Cushings Syndrome [No Rash] : no rash [Normal Strength/Tone] : muscle strength and tone were normal [Normal Reflexes] : deep tendon reflexes were 2+ and symmetric [No Tremors] : no tremors [Oriented x3] : oriented to person, place, and time [Acanthosis Nigricans] : no acanthosis nigricans [de-identified] : Left knee replacement [de-identified] : murmur

## 2020-05-27 NOTE — HISTORY OF PRESENT ILLNESS
[FreeTextEntry1] : Ms. TEENA CORBIN is a 64 year old female with a past medical history of hypothyroidism, hypertension, palpitations presents for secondary hypertension workup. Her first episode was in April and she was admitted to [facility]. She was found to have afib. She was started on diltiazem and eliquis. However, she is still having episodes even though her doses have been increased. Her blood pressure ranges at home between 150-170/80-90. Patient feels palpations, flushing in the face, tremulousness, weak. It can last 2+ hours. It comes and goes. She lost 30 lbs in 6 months but have been intentionally trying. She takes levothyroxine 50 mcg QD for hypothyroidism for years now. She has positive thyroid history in the family but no other hormonal problems. She is currently on 3 hypertensives Diltiazem  mg QD, HCTZ 37.5 mg QD, Valsartan 320 mg QD.\par

## 2020-05-27 NOTE — ASSESSMENT
[FreeTextEntry1] : Patient warrants a secondary hypertension work-up given that she is still uncontrolled on 3 antihypertensives. In addition, she is having adrenergic symptoms.\par Will r/o cushings, pheochromocytoma, hyperaldosteronism and serotonin syndrome\par Will perform blood work today\par Patient will have to do a few 24 hr tests as well\par Discussed with patient's daughter Corinne\par Answered all questions

## 2020-06-04 ENCOUNTER — APPOINTMENT (OUTPATIENT)
Dept: CARDIOLOGY | Facility: CLINIC | Age: 65
End: 2020-06-04
Payer: MEDICARE

## 2020-06-04 VITALS
OXYGEN SATURATION: 97 % | HEIGHT: 62 IN | BODY MASS INDEX: 30.55 KG/M2 | WEIGHT: 166 LBS | HEART RATE: 71 BPM | SYSTOLIC BLOOD PRESSURE: 156 MMHG | DIASTOLIC BLOOD PRESSURE: 75 MMHG

## 2020-06-04 PROCEDURE — 99214 OFFICE O/P EST MOD 30 MIN: CPT

## 2020-06-04 NOTE — HISTORY OF PRESENT ILLNESS
[FreeTextEntry1] : I saw Hue Guzman in the office today for cardiac follow up. She is a 64-year-old female who has been treated for asthma. She developed hypertension 6 months ago and has been on medication and doing fairly well. She was recently admitted to [facility] 4/20 with symptoms of palpitation, dizziness, chest pain, and headache. She was found to be in rapid atrial fibrillation and converted to sinus rhythm with IV of diltiazem. She was sent home on diltiazem 120 mg once a day. She was given aspirin. She is a CHADSvas 2 was not given anticoagulation because it was felt that her atrial fibrillation was less than 12 hours in duration.\par \par She called me in the office that she was having more rapid heartbeats and we increased her diltiazem 360 mg once a day. Since that time the heart has been better but she has been getting episodes of dizziness, headache, and chest discomfort. The episodes are better than on admission to the hospital but she is still having them daily. Her symptoms seem to be worse at night. When she checks her blood pressure at these times her blood pressure is high. \par \par Patient had a chemical nuclear stress test 5/20 that was normal. EF 66%. Echo showed an ejection fraction of 60-65%. There was calcification of mitral annulus with minimal MR. There was mild AI and TR. There was moderate left atrial enlargement. LVH with stage I diastolic dysfunction.\par \par As above, she has a history of hypertension, hyperlipidemia, and asthma. She denies any history of diabetes she has a brother had a myocardial infarction at age 40.\par \par The patient is having surges in heart rate and blood pressure that occur only at night. It seems to occur more often if she eats something. Blood pressure during the day is better but still elevated.The patient saw Dr Guzman, the endocrinologist who is doing a Hypertension workup. So far there are elevated levels of renin and aldosterone and she is going for  an evaluation for renovascular hypertension.\par \par I recently started on metoprolol 25 mg once a day for her heart rate and blood pressure. She took her first dose yesterday.\par \par

## 2020-06-04 NOTE — DISCUSSION/SUMMARY
[FreeTextEntry1] : The patient will stay on her present medication. She just started taking metoprolol yesterday and so far is having no problems. Did warn her about the potential problem with asthma. It turns out that she had stopped taking her valsartan and she will start restart this medication at half the pill once a day.\par \par She is going for testing on Saturday to evaluate possible renovascular hypertension. The studies on the adrenal gland are still pending.\par \par This was discussed with the patient and her daughter.\par \par If all is well I will see the patient again in approximately 2 weeks

## 2020-06-04 NOTE — PHYSICAL EXAM
[General Appearance - Well Developed] : well developed [Normal Appearance] : normal appearance [Well Groomed] : well groomed [General Appearance - Well Nourished] : well nourished [General Appearance - In No Acute Distress] : no acute distress [No Deformities] : no deformities [Normal Conjunctiva] : the conjunctiva exhibited no abnormalities [Normal Oral Mucosa] : normal oral mucosa [Normal Jugular Venous A Waves Present] : normal jugular venous A waves present [Normal Jugular Venous V Waves Present] : normal jugular venous V waves present [No Jugular Venous El A Waves] : no jugular venous el A waves [Respiration, Rhythm And Depth] : normal respiratory rhythm and effort [Exaggerated Use Of Accessory Muscles For Inspiration] : no accessory muscle use [Auscultation Breath Sounds / Voice Sounds] : lungs were clear to auscultation bilaterally [Bowel Sounds] : normal bowel sounds [Abdomen Tenderness] : non-tender [Abdomen Soft] : soft [Abnormal Walk] : normal gait [Gait - Sufficient For Exercise Testing] : the gait was sufficient for exercise testing [Nail Clubbing] : no clubbing of the fingernails [Cyanosis, Localized] : no localized cyanosis [Skin Color & Pigmentation] : normal skin color and pigmentation [Skin Turgor] : normal skin turgor [] : no rash [Impaired Insight] : insight and judgment were intact [Oriented To Time, Place, And Person] : oriented to person, place, and time [Not Palpable] : not palpable [No Anxiety] : not feeling anxious [Normal S1] : normal S1 [Normal Rate] : normal [Normal S2] : normal S2 [No Murmur] : no murmurs heard [2+] : Carotid: right 2+ [1+] : right 1+ [No Pitting Edema] : no pitting edema present [No Abnormalities] : the abdominal aorta was not enlarged and no bruit was heard [FreeTextEntry1] : Minimal expiratory wheeze [S3] : no S3 [S4] : no S4 [Right Carotid Bruit] : no bruit heard over the right carotid [Left Carotid Bruit] : no bruit heard over the left carotid [Right Femoral Bruit] : no bruit heard over the right femoral artery [Left Femoral Bruit] : no bruit heard over the left femoral artery

## 2020-06-04 NOTE — REVIEW OF SYSTEMS
[Headache] : headache [Recent Weight Loss (___ Lbs)] : recent [unfilled] ~Ulb weight loss [Dyspnea on exertion] : dyspnea during exertion [Chest Pain] : chest pain [Palpitations] : palpitations [Joint Pain] : joint pain [Hand Pain] : hand pain [Knee Pain] : knee pain [Dizziness] : dizziness [Depression] : depression [Anxiety] : anxiety [Easy Bruising] : a tendency for easy bruising [Negative] : Genitourinary [Feeling Fatigued] : not feeling fatigued [Fever] : no fever [Chills] : no chills [Blurry Vision] : no blurred vision [Seeing Double (Diplopia)] : no diplopia [Skin: A Rash] : no rash: [Excessive Thirst] : no polydipsia [Easy Bleeding] : no tendency for easy bleeding

## 2020-06-04 NOTE — REASON FOR VISIT
[Initial Evaluation] : an initial evaluation of [Atrial Fibrillation] : atrial fibrillation [Chest Pain] : chest pain [Dyspnea] : dyspnea [Hypertension] : hypertension [FreeTextEntry1] : Asthma

## 2020-06-06 ENCOUNTER — APPOINTMENT (OUTPATIENT)
Dept: CT IMAGING | Facility: CLINIC | Age: 65
End: 2020-06-06
Payer: MEDICARE

## 2020-06-06 ENCOUNTER — OUTPATIENT (OUTPATIENT)
Dept: OUTPATIENT SERVICES | Facility: HOSPITAL | Age: 65
LOS: 1 days | End: 2020-06-06
Payer: MEDICARE

## 2020-06-06 DIAGNOSIS — M43.22 FUSION OF SPINE, CERVICAL REGION: Chronic | ICD-10-CM

## 2020-06-06 DIAGNOSIS — I10 ESSENTIAL (PRIMARY) HYPERTENSION: ICD-10-CM

## 2020-06-06 PROCEDURE — 74174 CTA ABD&PLVS W/CONTRAST: CPT | Mod: 26

## 2020-06-06 PROCEDURE — 74174 CTA ABD&PLVS W/CONTRAST: CPT

## 2020-06-09 LAB
5OH-INDOLEACETATE 24H UR-MRATE: 2.3 MG/24 H
5OH-INDOLEACETATE UR-MCNC: 0.49 G/24 H
ALBUMIN SERPL ELPH-MCNC: 4.5 G/DL
ALDOSTERONE SERUM: 20.3 NG/DL
ALP BLD-CCNC: 96 U/L
ALT SERPL-CCNC: 22 U/L
ANION GAP SERPL CALC-SCNC: 13 MMOL/L
AST SERPL-CCNC: 24 U/L
BILIRUB SERPL-MCNC: 0.2 MG/DL
BUN SERPL-MCNC: 10 MG/DL
CALCIUM SERPL-MCNC: 9.7 MG/DL
CGA SERPL-MCNC: 504 NG/ML
CHLORIDE SERPL-SCNC: 96 MMOL/L
CO2 SERPL-SCNC: 28 MMOL/L
CORTIS 24H UR-MCNC: 24 H
CORTIS 24H UR-MRATE: 2.3 MCG/24 H
CREAT SERPL-MCNC: 0.67 MG/DL
DOPAMINE UR-MCNC: <30 PG/ML
EPINEPH UR-MCNC: 18 PG/ML
GLUCOSE SERPL-MCNC: 108 MG/DL
METANEPHRINE, PL: <10 PG/ML
NOREPINEPH UR-MCNC: 345 PG/ML
NORMETANEPHRINE, PL: 77 PG/ML
POTASSIUM SERPL-SCNC: 3.9 MMOL/L
PROT SERPL-MCNC: 6.9 G/DL
SODIUM SERPL-SCNC: 138 MMOL/L
SPECIMEN VOL 24H UR: 2300 ML
SPECIMEN VOL 24H UR: 2300 ML
T4 FREE SERPL-MCNC: 1.4 NG/DL
TSH SERPL-ACNC: 0.54 UIU/ML

## 2020-06-15 ENCOUNTER — APPOINTMENT (OUTPATIENT)
Dept: CARDIOLOGY | Facility: CLINIC | Age: 65
End: 2020-06-15
Payer: MEDICARE

## 2020-06-15 PROCEDURE — 0296T: CPT

## 2020-06-15 PROCEDURE — 0298T: CPT

## 2020-06-25 DIAGNOSIS — Z01.818 ENCOUNTER FOR OTHER PREPROCEDURAL EXAMINATION: ICD-10-CM

## 2020-06-27 ENCOUNTER — NON-APPOINTMENT (OUTPATIENT)
Age: 65
End: 2020-06-27

## 2020-06-29 ENCOUNTER — APPOINTMENT (OUTPATIENT)
Dept: DISASTER EMERGENCY | Facility: CLINIC | Age: 65
End: 2020-06-29

## 2020-06-29 LAB — SARS-COV-2 N GENE NPH QL NAA+PROBE: NOT DETECTED

## 2020-06-30 DIAGNOSIS — R79.89 OTHER SPECIFIED ABNORMAL FINDINGS OF BLOOD CHEMISTRY: ICD-10-CM

## 2020-07-01 LAB
DOPAMINE UR-MCNC: 24 UG/L
DOPAMINE, UR, 24HR: 72 UG/24 HR
EPINEPH UR-MCNC: <1 UG/L
EPINEPHRINE, U, 24HR: <3 UG/24 HR
NOREPINEPH UR-MCNC: 9 UG/L
NOREPINEPHRINE,U,24H: 27 UG/24 HR

## 2020-07-08 ENCOUNTER — APPOINTMENT (OUTPATIENT)
Dept: ENDOCRINOLOGY | Facility: CLINIC | Age: 65
End: 2020-07-08
Payer: MEDICARE

## 2020-07-08 VITALS
HEIGHT: 62 IN | WEIGHT: 166 LBS | SYSTOLIC BLOOD PRESSURE: 151 MMHG | BODY MASS INDEX: 30.55 KG/M2 | OXYGEN SATURATION: 100 % | DIASTOLIC BLOOD PRESSURE: 67 MMHG | HEART RATE: 73 BPM

## 2020-07-08 PROCEDURE — 99214 OFFICE O/P EST MOD 30 MIN: CPT

## 2020-07-08 NOTE — END OF VISIT
Per patient   Bupropion  Rosuvastatin  Need to be refilled but pharmacy said they did not have refills on these medications. Patient said she is out of these medications.  [facility]  [place]   [place]     [Time Spent: ___ minutes] : I have spent [unfilled] minutes of time on the encounter. [>50% of the face to face encounter time was spent on counseling and/or coordination of care for ___] : Greater than 50% of the face to face encounter time was spent on counseling and/or coordination of care for [unfilled]

## 2020-07-08 NOTE — PHYSICAL EXAM
[Alert] : alert [No Acute Distress] : no acute distress [Well Developed] : well developed [Well Nourished] : well nourished [Normal Sclera/Conjunctiva] : normal sclera/conjunctiva [EOMI] : extra ocular movement intact [No Proptosis] : no proptosis [Normal Oropharynx] : the oropharynx was normal [No Thyroid Nodules] : no palpable thyroid nodules [Thyroid Not Enlarged] : the thyroid was not enlarged [No Respiratory Distress] : no respiratory distress [No Accessory Muscle Use] : no accessory muscle use [Normal S1, S2] : normal S1 and S2 [Normal Rate] : heart rate was normal [Clear to Auscultation] : lungs were clear to auscultation bilaterally [No Edema] : no peripheral edema [Regular Rhythm] : with a regular rhythm [Pedal Pulses Normal] : the pedal pulses are present [Not Tender] : non-tender [Normal Bowel Sounds] : normal bowel sounds [Not Distended] : not distended [Soft] : abdomen soft [Normal Anterior Cervical Nodes] : no anterior cervical lymphadenopathy [Spine Straight] : spine straight [No Stigmata of Cushings Syndrome] : no stigmata of Cushings Syndrome [No Spinal Tenderness] : no spinal tenderness [Normal Gait] : normal gait [No Rash] : no rash [Normal Strength/Tone] : muscle strength and tone were normal [Acanthosis Nigricans] : no acanthosis nigricans [Oriented x3] : oriented to person, place, and time [Normal Reflexes] : deep tendon reflexes were 2+ and symmetric [No Tremors] : no tremors [de-identified] : murmur [de-identified] : Left knee replacement

## 2020-07-08 NOTE — HISTORY OF PRESENT ILLNESS
[FreeTextEntry1] : Ms. TEENA CORBIN is a 64 year old female with a past medical history of hypothyroidism, hypertension, palpitations presents for secondary hypertension workup. \par \par Interval Hx:\par Patient had blood work and urine done which showed elevated Renin but CT Abdomen showed no abnormalities. She still has been having persistent episodes of high blood pressure. She will get flushing and heat on her face. It happens a lot at night. She has daily episodes and is very concerned about her symptoms and frustrated that she cannot get the levels under control. She started taking the hydralazine but feels its not helping much. She currently on Diltiazem, Valsartan, HCTZ and Hydralazine.\par \par Initial Hx (5/27/20): Her first episode was in April and she was admitted to [facility]. She was found to have afib. She was started on diltiazem and eliquis. However, she is still having episodes even though her doses have been increased. Her blood pressure ranges at home between 150-170/80-90. Patient feels palpations, flushing in the face, tremulousness, weak. It can last 2+ hours. It comes and goes. She lost 30 lbs in 6 months but have been intentionally trying. She takes levothyroxine 50 mcg QD for hypothyroidism for years now. She has positive thyroid history in the family but no other hormonal problems. She is currently on 3 hypertensives Diltiazem  mg QD, HCTZ 37.5 mg QD, Valsartan 320 mg QD.\par

## 2020-07-08 NOTE — ASSESSMENT
[FreeTextEntry1] : Patient warrants a repeat secondary hypertension work-up given that she is still uncontrolled on 4 antihypertensives. In addition, she is having adrenergic symptoms.\par Will repeat labs and send for duplex of her renal arteries\par Will perform blood work today\par Patient will have to do a few 24 hr tests as well\par Cont current anti hypertensives

## 2020-07-10 ENCOUNTER — APPOINTMENT (OUTPATIENT)
Dept: ULTRASOUND IMAGING | Facility: CLINIC | Age: 65
End: 2020-07-10

## 2020-07-14 ENCOUNTER — APPOINTMENT (OUTPATIENT)
Dept: ULTRASOUND IMAGING | Facility: CLINIC | Age: 65
End: 2020-07-14
Payer: MEDICARE

## 2020-07-14 ENCOUNTER — OUTPATIENT (OUTPATIENT)
Dept: OUTPATIENT SERVICES | Facility: HOSPITAL | Age: 65
LOS: 1 days | End: 2020-07-14
Payer: MEDICARE

## 2020-07-14 DIAGNOSIS — Z00.8 ENCOUNTER FOR OTHER GENERAL EXAMINATION: ICD-10-CM

## 2020-07-14 DIAGNOSIS — M43.22 FUSION OF SPINE, CERVICAL REGION: Chronic | ICD-10-CM

## 2020-07-14 DIAGNOSIS — R79.89 OTHER SPECIFIED ABNORMAL FINDINGS OF BLOOD CHEMISTRY: ICD-10-CM

## 2020-07-14 PROCEDURE — 93975 VASCULAR STUDY: CPT | Mod: 26

## 2020-07-14 PROCEDURE — 93975 VASCULAR STUDY: CPT

## 2020-07-17 LAB
ALDOSTERONE SERUM: 21.8 NG/DL
DOPAMINE UR-MCNC: 22 UG/L
DOPAMINE UR-MCNC: <30 PG/ML
DOPAMINE, UR, 24HR: 29 UG/24 HR
EPINEPH UR-MCNC: <1 UG/L
EPINEPH UR-MCNC: <15 PG/ML
EPINEPHRINE, U, 24HR: <1 UG/24 HR
METAINT: 24 H
METANEPH 24H UR-MRATE: 22 MCG/24 H
METANEPH UR-MCNC: 1300 ML
METANEPHRINE, PL: <10 PG/ML
METANEPHS UR-MCNC: 146 MCG/24 H
NOREPINEPH UR-MCNC: 317 PG/ML
NOREPINEPH UR-MCNC: 8 UG/L
NOREPINEPHRINE,U,24H: 10 UG/24 HR
NORMETANEPHRINE 24H UR-MCNC: 124 MCG/24 H
NORMETANEPHRINE, PL: 120.4 PG/ML

## 2020-07-28 ENCOUNTER — APPOINTMENT (OUTPATIENT)
Dept: CARDIOLOGY | Facility: CLINIC | Age: 65
End: 2020-07-28

## 2020-08-03 ENCOUNTER — APPOINTMENT (OUTPATIENT)
Dept: CARDIOLOGY | Facility: CLINIC | Age: 65
End: 2020-08-03

## 2020-08-04 ENCOUNTER — NON-APPOINTMENT (OUTPATIENT)
Age: 65
End: 2020-08-04

## 2020-08-12 ENCOUNTER — APPOINTMENT (OUTPATIENT)
Dept: CARDIOLOGY | Facility: CLINIC | Age: 65
End: 2020-08-12
Payer: MEDICARE

## 2020-08-12 PROCEDURE — 93790 AMBL BP MNTR W/SW I&R: CPT

## 2020-08-17 ENCOUNTER — RX RENEWAL (OUTPATIENT)
Age: 65
End: 2020-08-17

## 2020-08-28 ENCOUNTER — INPATIENT (INPATIENT)
Facility: HOSPITAL | Age: 65
LOS: 1 days | Discharge: HOME | DRG: 641 | End: 2020-08-30
Attending: FAMILY MEDICINE | Admitting: STUDENT IN AN ORGANIZED HEALTH CARE EDUCATION/TRAINING PROGRAM
Payer: MEDICARE

## 2020-08-28 VITALS
TEMPERATURE: 98 F | DIASTOLIC BLOOD PRESSURE: 78 MMHG | OXYGEN SATURATION: 99 % | RESPIRATION RATE: 19 BRPM | SYSTOLIC BLOOD PRESSURE: 154 MMHG | WEIGHT: 154.98 LBS | HEART RATE: 59 BPM | HEIGHT: 62 IN

## 2020-08-28 DIAGNOSIS — I48.91 UNSPECIFIED ATRIAL FIBRILLATION: ICD-10-CM

## 2020-08-28 DIAGNOSIS — E87.1 HYPO-OSMOLALITY AND HYPONATREMIA: ICD-10-CM

## 2020-08-28 DIAGNOSIS — E03.9 HYPOTHYROIDISM, UNSPECIFIED: ICD-10-CM

## 2020-08-28 DIAGNOSIS — M43.22 FUSION OF SPINE, CERVICAL REGION: Chronic | ICD-10-CM

## 2020-08-28 DIAGNOSIS — I10 ESSENTIAL (PRIMARY) HYPERTENSION: ICD-10-CM

## 2020-08-28 DIAGNOSIS — Z29.9 ENCOUNTER FOR PROPHYLACTIC MEASURES, UNSPECIFIED: ICD-10-CM

## 2020-08-28 DIAGNOSIS — M85.80 OTHER SPECIFIED DISORDERS OF BONE DENSITY AND STRUCTURE, UNSPECIFIED SITE: ICD-10-CM

## 2020-08-28 DIAGNOSIS — B34.9 VIRAL INFECTION, UNSPECIFIED: ICD-10-CM

## 2020-08-28 LAB
ALBUMIN SERPL ELPH-MCNC: 3.4 G/DL — SIGNIFICANT CHANGE UP (ref 3.3–5)
ALP SERPL-CCNC: 75 U/L — SIGNIFICANT CHANGE UP (ref 40–120)
ALT FLD-CCNC: 26 U/L — SIGNIFICANT CHANGE UP (ref 12–78)
ANION GAP SERPL CALC-SCNC: 4 MMOL/L — LOW (ref 5–17)
ANION GAP SERPL CALC-SCNC: 7 MMOL/L — SIGNIFICANT CHANGE UP (ref 5–17)
AST SERPL-CCNC: 26 U/L — SIGNIFICANT CHANGE UP (ref 15–37)
BASOPHILS # BLD AUTO: 0.02 K/UL — SIGNIFICANT CHANGE UP (ref 0–0.2)
BASOPHILS NFR BLD AUTO: 0.2 % — SIGNIFICANT CHANGE UP (ref 0–2)
BILIRUB SERPL-MCNC: 0.4 MG/DL — SIGNIFICANT CHANGE UP (ref 0.2–1.2)
BUN SERPL-MCNC: 7 MG/DL — SIGNIFICANT CHANGE UP (ref 7–23)
BUN SERPL-MCNC: 9 MG/DL — SIGNIFICANT CHANGE UP (ref 7–23)
CALCIUM SERPL-MCNC: 8.5 MG/DL — SIGNIFICANT CHANGE UP (ref 8.5–10.1)
CALCIUM SERPL-MCNC: 8.5 MG/DL — SIGNIFICANT CHANGE UP (ref 8.5–10.1)
CHLORIDE SERPL-SCNC: 89 MMOL/L — LOW (ref 96–108)
CHLORIDE SERPL-SCNC: 92 MMOL/L — LOW (ref 96–108)
CO2 SERPL-SCNC: 26 MMOL/L — SIGNIFICANT CHANGE UP (ref 22–31)
CO2 SERPL-SCNC: 29 MMOL/L — SIGNIFICANT CHANGE UP (ref 22–31)
CREAT SERPL-MCNC: 0.54 MG/DL — SIGNIFICANT CHANGE UP (ref 0.5–1.3)
CREAT SERPL-MCNC: 0.7 MG/DL — SIGNIFICANT CHANGE UP (ref 0.5–1.3)
EOSINOPHIL # BLD AUTO: 0.03 K/UL — SIGNIFICANT CHANGE UP (ref 0–0.5)
EOSINOPHIL NFR BLD AUTO: 0.3 % — SIGNIFICANT CHANGE UP (ref 0–6)
GLUCOSE SERPL-MCNC: 85 MG/DL — SIGNIFICANT CHANGE UP (ref 70–99)
GLUCOSE SERPL-MCNC: 89 MG/DL — SIGNIFICANT CHANGE UP (ref 70–99)
HCT VFR BLD CALC: 29.7 % — LOW (ref 34.5–45)
HGB BLD-MCNC: 10.4 G/DL — LOW (ref 11.5–15.5)
IMM GRANULOCYTES NFR BLD AUTO: 0.4 % — SIGNIFICANT CHANGE UP (ref 0–1.5)
LYMPHOCYTES # BLD AUTO: 1.9 K/UL — SIGNIFICANT CHANGE UP (ref 1–3.3)
LYMPHOCYTES # BLD AUTO: 18.7 % — SIGNIFICANT CHANGE UP (ref 13–44)
MCHC RBC-ENTMCNC: 29.4 PG — SIGNIFICANT CHANGE UP (ref 27–34)
MCHC RBC-ENTMCNC: 35 GM/DL — SIGNIFICANT CHANGE UP (ref 32–36)
MCV RBC AUTO: 83.9 FL — SIGNIFICANT CHANGE UP (ref 80–100)
MONOCYTES # BLD AUTO: 0.84 K/UL — SIGNIFICANT CHANGE UP (ref 0–0.9)
MONOCYTES NFR BLD AUTO: 8.3 % — SIGNIFICANT CHANGE UP (ref 2–14)
NEUTROPHILS # BLD AUTO: 7.34 K/UL — SIGNIFICANT CHANGE UP (ref 1.8–7.4)
NEUTROPHILS NFR BLD AUTO: 72.1 % — SIGNIFICANT CHANGE UP (ref 43–77)
NRBC # BLD: 0 /100 WBCS — SIGNIFICANT CHANGE UP (ref 0–0)
PLATELET # BLD AUTO: 410 K/UL — HIGH (ref 150–400)
POTASSIUM SERPL-MCNC: 3.8 MMOL/L — SIGNIFICANT CHANGE UP (ref 3.5–5.3)
POTASSIUM SERPL-MCNC: 4.8 MMOL/L — SIGNIFICANT CHANGE UP (ref 3.5–5.3)
POTASSIUM SERPL-SCNC: 3.8 MMOL/L — SIGNIFICANT CHANGE UP (ref 3.5–5.3)
POTASSIUM SERPL-SCNC: 4.8 MMOL/L — SIGNIFICANT CHANGE UP (ref 3.5–5.3)
PROT SERPL-MCNC: 6.6 G/DL — SIGNIFICANT CHANGE UP (ref 6–8.3)
RAPID RVP RESULT: SIGNIFICANT CHANGE UP
RBC # BLD: 3.54 M/UL — LOW (ref 3.8–5.2)
RBC # FLD: 12.7 % — SIGNIFICANT CHANGE UP (ref 10.3–14.5)
SODIUM SERPL-SCNC: 122 MMOL/L — LOW (ref 135–145)
SODIUM SERPL-SCNC: 125 MMOL/L — LOW (ref 135–145)
WBC # BLD: 10.17 K/UL — SIGNIFICANT CHANGE UP (ref 3.8–10.5)
WBC # FLD AUTO: 10.17 K/UL — SIGNIFICANT CHANGE UP (ref 3.8–10.5)

## 2020-08-28 PROCEDURE — 99223 1ST HOSP IP/OBS HIGH 75: CPT | Mod: AI

## 2020-08-28 PROCEDURE — 93010 ELECTROCARDIOGRAM REPORT: CPT

## 2020-08-28 PROCEDURE — 71045 X-RAY EXAM CHEST 1 VIEW: CPT | Mod: 26

## 2020-08-28 PROCEDURE — 99285 EMERGENCY DEPT VISIT HI MDM: CPT

## 2020-08-28 RX ORDER — DILTIAZEM HCL 120 MG
360 CAPSULE, EXT RELEASE 24 HR ORAL DAILY
Refills: 0 | Status: DISCONTINUED | OUTPATIENT
Start: 2020-08-28 | End: 2020-08-30

## 2020-08-28 RX ORDER — APIXABAN 2.5 MG/1
5 TABLET, FILM COATED ORAL EVERY 12 HOURS
Refills: 0 | Status: DISCONTINUED | OUTPATIENT
Start: 2020-08-28 | End: 2020-08-30

## 2020-08-28 RX ORDER — DIAZEPAM 5 MG
2 TABLET ORAL
Refills: 0 | Status: DISCONTINUED | OUTPATIENT
Start: 2020-08-28 | End: 2020-08-30

## 2020-08-28 RX ORDER — FLUTICASONE PROPIONATE 50 MCG
1 SPRAY, SUSPENSION NASAL
Refills: 0 | Status: DISCONTINUED | OUTPATIENT
Start: 2020-08-28 | End: 2020-08-30

## 2020-08-28 RX ORDER — ROSUVASTATIN CALCIUM 5 MG/1
1 TABLET ORAL
Qty: 0 | Refills: 0 | DISCHARGE

## 2020-08-28 RX ORDER — OMEPRAZOLE 10 MG/1
1 CAPSULE, DELAYED RELEASE ORAL
Qty: 0 | Refills: 0 | DISCHARGE

## 2020-08-28 RX ORDER — SODIUM CHLORIDE 9 MG/ML
1000 INJECTION INTRAMUSCULAR; INTRAVENOUS; SUBCUTANEOUS
Refills: 0 | Status: DISCONTINUED | OUTPATIENT
Start: 2020-08-28 | End: 2020-08-28

## 2020-08-28 RX ORDER — MONTELUKAST 4 MG/1
1 TABLET, CHEWABLE ORAL
Qty: 0 | Refills: 0 | DISCHARGE

## 2020-08-28 RX ORDER — DILTIAZEM HCL 120 MG
1 CAPSULE, EXT RELEASE 24 HR ORAL
Qty: 0 | Refills: 0 | DISCHARGE

## 2020-08-28 RX ORDER — TIOTROPIUM BROMIDE 18 UG/1
1 CAPSULE ORAL; RESPIRATORY (INHALATION)
Qty: 0 | Refills: 0 | DISCHARGE

## 2020-08-28 RX ORDER — TRAMADOL HYDROCHLORIDE 50 MG/1
1 TABLET ORAL
Qty: 0 | Refills: 0 | DISCHARGE

## 2020-08-28 RX ORDER — ALBUTEROL 90 UG/1
2.5 AEROSOL, METERED ORAL EVERY 6 HOURS
Refills: 0 | Status: DISCONTINUED | OUTPATIENT
Start: 2020-08-28 | End: 2020-08-30

## 2020-08-28 RX ORDER — MONTELUKAST 4 MG/1
10 TABLET, CHEWABLE ORAL DAILY
Refills: 0 | Status: DISCONTINUED | OUTPATIENT
Start: 2020-08-28 | End: 2020-08-30

## 2020-08-28 RX ORDER — LEVOTHYROXINE SODIUM 125 MCG
50 TABLET ORAL DAILY
Refills: 0 | Status: DISCONTINUED | OUTPATIENT
Start: 2020-08-28 | End: 2020-08-30

## 2020-08-28 RX ORDER — DIAZEPAM 5 MG
1 TABLET ORAL
Qty: 0 | Refills: 0 | DISCHARGE

## 2020-08-28 RX ORDER — HYDRALAZINE HCL 50 MG
10 TABLET ORAL
Refills: 0 | Status: DISCONTINUED | OUTPATIENT
Start: 2020-08-28 | End: 2020-08-30

## 2020-08-28 RX ORDER — VALSARTAN 80 MG/1
320 TABLET ORAL DAILY
Refills: 0 | Status: DISCONTINUED | OUTPATIENT
Start: 2020-08-28 | End: 2020-08-28

## 2020-08-28 RX ORDER — FLUTICASONE PROPIONATE AND SALMETEROL 50; 250 UG/1; UG/1
2 POWDER ORAL; RESPIRATORY (INHALATION)
Qty: 0 | Refills: 0 | DISCHARGE

## 2020-08-28 RX ORDER — ACETAMINOPHEN 500 MG
650 TABLET ORAL ONCE
Refills: 0 | Status: COMPLETED | OUTPATIENT
Start: 2020-08-28 | End: 2020-08-28

## 2020-08-28 RX ORDER — APIXABAN 2.5 MG/1
1 TABLET, FILM COATED ORAL
Qty: 0 | Refills: 0 | DISCHARGE

## 2020-08-28 RX ORDER — TRAMADOL HYDROCHLORIDE 50 MG/1
50 TABLET ORAL
Refills: 0 | Status: DISCONTINUED | OUTPATIENT
Start: 2020-08-28 | End: 2020-08-30

## 2020-08-28 RX ORDER — ASPIRIN/CALCIUM CARB/MAGNESIUM 324 MG
1 TABLET ORAL
Qty: 0 | Refills: 0 | DISCHARGE

## 2020-08-28 RX ORDER — BUDESONIDE AND FORMOTEROL FUMARATE DIHYDRATE 160; 4.5 UG/1; UG/1
2 AEROSOL RESPIRATORY (INHALATION)
Refills: 0 | Status: DISCONTINUED | OUTPATIENT
Start: 2020-08-28 | End: 2020-08-30

## 2020-08-28 RX ORDER — TIOTROPIUM BROMIDE 18 UG/1
1 CAPSULE ORAL; RESPIRATORY (INHALATION) DAILY
Refills: 0 | Status: DISCONTINUED | OUTPATIENT
Start: 2020-08-28 | End: 2020-08-30

## 2020-08-28 RX ORDER — ALBUTEROL 90 UG/1
1 AEROSOL, METERED ORAL EVERY 4 HOURS
Refills: 0 | Status: DISCONTINUED | OUTPATIENT
Start: 2020-08-28 | End: 2020-08-30

## 2020-08-28 RX ADMIN — Medication 650 MILLIGRAM(S): at 17:00

## 2020-08-28 RX ADMIN — TRAMADOL HYDROCHLORIDE 50 MILLIGRAM(S): 50 TABLET ORAL at 22:33

## 2020-08-28 RX ADMIN — Medication 100 MILLIGRAM(S): at 22:42

## 2020-08-28 RX ADMIN — Medication 650 MILLIGRAM(S): at 17:43

## 2020-08-28 RX ADMIN — TRAMADOL HYDROCHLORIDE 50 MILLIGRAM(S): 50 TABLET ORAL at 02:30

## 2020-08-28 RX ADMIN — APIXABAN 5 MILLIGRAM(S): 2.5 TABLET, FILM COATED ORAL at 22:33

## 2020-08-28 RX ADMIN — Medication 10 MILLIGRAM(S): at 22:33

## 2020-08-28 NOTE — H&P ADULT - NSHPSOCIALHISTORY_GEN_ALL_CORE
lives at home with   never smoker  denies etoh, recreational drugs . uses CBD oil for chronic pain   ambulates independently

## 2020-08-28 NOTE — ED ADULT NURSE NOTE - PMH
Asthma  trigger getting a cold. hospitalized multipl  times last 2007 denies intubation  Cervical Disc Displacement  current diagnosis  GERD (Gastroesophageal Reflux Disease)    Hypothyroidism    Osteopenia    Spinal stenosis

## 2020-08-28 NOTE — ED PROVIDER NOTE - OBJECTIVE STATEMENT
Referred by Dr. Ames for low potassium today [phone].  Muscle aches x 2 days in  both thigh.  No sob, cp.  no other complaints. Referred by Dr. Ames for low potassium today [phone].  Muscle aches x 2 days in  both thigh.  No sob, cp.  no other complaints.

## 2020-08-28 NOTE — H&P ADULT - HISTORY OF PRESENT ILLNESS
66 yo f pmh HTN, Asthma, Afib on eliquis, GERD, Osteopenia,  presents with complaint of abnormal labs. Pt states back in september of this past year she was diagnosed with HTN. She had never previously been hypertensive. She has seen multiple specialists and the etiology of her hypertension has not been identified and it has been difficult to control. Pt was admitted in April for hypertension found to be in new onset afib. These past 3 days pt states she has had episodes of hypertension overnight- BP max 189/90. She takes her medications and by morning the BP has improved. She states when she becomes hypertensive she also develops "shaking". Over the past several days pt also has some sinus congestion, sore throat and myalgias. She believes this is unrelated and she has just had a cold. She was evaluated by her nephrologist on 8/20 and had lab work performed. She was called today and advised to present to the ER due to hyponatremia.   Pt denies chest pain, sick contacts, nausea, vomiting, diarrhea, abdominal pain. Admits to SOB (chronic and @ baseline), myalgias, and headachess    In the ED CBC, CMP, Mg, CTA abd/pelv were performed. Significant for H&H 10.4/29.7, Na 122. CTA with no evidence of renal artery stenosis. Pt was started on NS @ 125 cc/hr. Dr. Roy Renal was consulted. /78 -> 134/81. Remainder of vitals WNL.

## 2020-08-28 NOTE — H&P ADULT - PROBLEM SELECTOR PLAN 2
- pt with c/o myalgias, sinus congestion, wheezing on exam  - suspect viral infection.   - pt currently afebrile, without leukocytosis   - CXR pending, COVID PCR pending. Check RVP  - c/w precautions pending above. if covid neg will need to check markers  - start albuterol nebs prn SOB/Wheezing.   -Pt w/ h/o Asthma , chronic sob currently at baseline, sating well on RA  - flonase prn   - monitor for fever. trend cbc - pt with c/o myalgias, sinus congestion, wheezing on exam  - suspect viral infection.   - pt currently afebrile, without leukocytosis   - CXR pending, COVID PCR pending. Check RVP  - c/w precautions pending above  - start albuterol nebs prn SOB/Wheezing.   -Pt w/ h/o Asthma , chronic sob currently at baseline, sating well on RA  - flonase prn   - monitor for fever. trend cbc

## 2020-08-28 NOTE — H&P ADULT - PROBLEM SELECTOR PLAN 1
- pt presenting with generalized weakness,  fatigue, found to have sodium 122  - likely 2/2 HCTZ use  - continue with normal saline , recheck bmp q 6 hours  - avoid overcorrection  - Dr. Roy Consulted

## 2020-08-28 NOTE — H&P ADULT - NSHPREVIEWOFSYSTEMS_GEN_ALL_CORE
Constitutional: denies fever, admits general malaise, Shaking ?chills  HEENT: denies dry mouth, Admits sore throat, sinus congestions, Denies visual changes.  Respiratory: Admits SOB (chronic @baseline), wheezing. denies cough, sputum production,   Cardiovascular: denies CP, palpitations, edema  Gastrointestinal: denies nausea, vomiting, diarrhea, constipation, abdominal pain, melena, hematochezia   Genitourinary: denies dysuria, frequency, urgency, incontinence, hematuria   Skin/Breast: denies rash  Musculoskeletal: Admits myalgias, Admits arthritis, chronic msk pain  Neurologic: denies syncope, LOC, admits headache, weakness  ROS negative except as noted above

## 2020-08-28 NOTE — ED ADULT NURSE REASSESSMENT NOTE - NS ED NURSE REASSESS COMMENT FT1
Pt received sitting on stretcher in NAD. Pt AOx3  awaiting dispo.  Neuro WNL. PERRLA. Lungs CTA, RR even unlabored. Ab soft non tender, + bowel sounds x 4quads. Denies Nausea, Vomiting, Diarrhea. Skin warm, dry, color appropriate for age and race.
Patient refusing ultrasound.  Isauro. Zuleika erickson.

## 2020-08-28 NOTE — H&P ADULT - NSHPPHYSICALEXAM_GEN_ALL_CORE
Vital Signs Last 24 Hrs  T(C): 37 (28 Aug 2020 19:36), Max: 37 (28 Aug 2020 19:36)  T(F): 98.6 (28 Aug 2020 19:36), Max: 98.6 (28 Aug 2020 19:36)  HR: 64 (28 Aug 2020 19:36) (59 - 64)  BP: 134/81 (28 Aug 2020 19:36) (134/81 - 154/78)  BP(mean): 99 (28 Aug 2020 19:36) (99 - 99)  RR: 16 (28 Aug 2020 19:36) (16 - 19)  SpO2: 98% (28 Aug 2020 19:36) (98% - 99%)    General: Well developed, well nourished, NAD  HEENT: NCAT, PERRLA, EOMI bl, moist mucous membranes   Neck: Supple, nontender, no mass  Neurology: A&Ox3, nonfocal  Respiratory: diffuse end expiratory wheeze b/l, no accessory muscle use, respirations non labored   CV: RRR, +S1/S2, no murmurs, rubs or gallops  Abdominal: Soft, NT, ND +BSx4  Extremities: No C/C/E, + peripheral pulses  MSK: Normal ROM, no joint erythema or warmth, no joint swelling   Skin: warm, dry, normal color

## 2020-08-28 NOTE — H&P ADULT - ASSESSMENT
66 yo f pmh HTN, Asthma, Afib on eliquis, GERD, Osteopenia,  presents with abnormal outpatient labs , found to be hyponatremic, also with suspected viral infection.

## 2020-08-28 NOTE — H&P ADULT - NSICDXPASTMEDICALHX_GEN_ALL_CORE_FT
PAST MEDICAL HISTORY:  Afib     Asthma trigger getting a cold. hospitalized multipl  times last 2007 denies intubation    Cervical Disc Displacement current diagnosis    GERD (Gastroesophageal Reflux Disease)     Hypertension     Hypothyroidism     Osteopenia     Spinal stenosis

## 2020-08-28 NOTE — H&P ADULT - PROBLEM SELECTOR PLAN 3
- bp currently wnl   - h/o hypertensive urgency on multiple antihypertensive meds  - hold hctz in setting of hyponatremia  - c/w valsartan, hydralazine, cardizem

## 2020-08-28 NOTE — H&P ADULT - NSHPLABSRESULTS_GEN_ALL_CORE
10.4   10.17 )-----------( 410      ( 28 Aug 2020 16:52 )             29.7     28 Aug 2020 16:52    122    |  89     |  9      ----------------------------<  85     4.8     |  29     |  0.70     Ca    8.5        28 Aug 2020 16:52  Mg     1.8       28 Aug 2020 16:52    TPro  6.6    /  Alb  3.4    /  TBili  0.4    /  DBili  x      /  AST  26     /  ALT  26     /  AlkPhos  75     28 Aug 2020 16:52    LIVER FUNCTIONS - ( 28 Aug 2020 16:52 )  Alb: 3.4 g/dL / Pro: 6.6 g/dL / ALK PHOS: 75 U/L / ALT: 26 U/L / AST: 26 U/L / GGT: x             CAPILLARY BLOOD GLUCOSE            < from: CT Angio Abdomen and Pelvis w/ IV Cont (06.06.20 @ 10:16) >    IMPRESSION:   No evidence of renal artery stenosis.        < end of copied text >

## 2020-08-29 LAB
ALBUMIN SERPL ELPH-MCNC: 3.2 G/DL — LOW (ref 3.3–5)
ALP SERPL-CCNC: 74 U/L — SIGNIFICANT CHANGE UP (ref 40–120)
ALT FLD-CCNC: 26 U/L — SIGNIFICANT CHANGE UP (ref 12–78)
ANION GAP SERPL CALC-SCNC: 5 MMOL/L — SIGNIFICANT CHANGE UP (ref 5–17)
AST SERPL-CCNC: 25 U/L — SIGNIFICANT CHANGE UP (ref 15–37)
B PERT DNA SPEC QL NAA+PROBE: SIGNIFICANT CHANGE UP
BILIRUB SERPL-MCNC: 0.4 MG/DL — SIGNIFICANT CHANGE UP (ref 0.2–1.2)
BUN SERPL-MCNC: 8 MG/DL — SIGNIFICANT CHANGE UP (ref 7–23)
C PNEUM DNA SPEC QL NAA+PROBE: SIGNIFICANT CHANGE UP
CALCIUM SERPL-MCNC: 8.7 MG/DL — SIGNIFICANT CHANGE UP (ref 8.5–10.1)
CHLORIDE SERPL-SCNC: 97 MMOL/L — SIGNIFICANT CHANGE UP (ref 96–108)
CO2 SERPL-SCNC: 29 MMOL/L — SIGNIFICANT CHANGE UP (ref 22–31)
CREAT SERPL-MCNC: 0.65 MG/DL — SIGNIFICANT CHANGE UP (ref 0.5–1.3)
FLUAV H1 2009 PAND RNA SPEC QL NAA+PROBE: SIGNIFICANT CHANGE UP
FLUAV H1 RNA SPEC QL NAA+PROBE: SIGNIFICANT CHANGE UP
FLUAV H3 RNA SPEC QL NAA+PROBE: SIGNIFICANT CHANGE UP
FLUAV SUBTYP SPEC NAA+PROBE: SIGNIFICANT CHANGE UP
FLUBV RNA SPEC QL NAA+PROBE: SIGNIFICANT CHANGE UP
GLUCOSE SERPL-MCNC: 83 MG/DL — SIGNIFICANT CHANGE UP (ref 70–99)
HADV DNA SPEC QL NAA+PROBE: SIGNIFICANT CHANGE UP
HCOV PNL SPEC NAA+PROBE: SIGNIFICANT CHANGE UP
HCT VFR BLD CALC: 30.7 % — LOW (ref 34.5–45)
HGB BLD-MCNC: 10.7 G/DL — LOW (ref 11.5–15.5)
HMPV RNA SPEC QL NAA+PROBE: SIGNIFICANT CHANGE UP
HPIV1 RNA SPEC QL NAA+PROBE: SIGNIFICANT CHANGE UP
HPIV2 RNA SPEC QL NAA+PROBE: SIGNIFICANT CHANGE UP
HPIV3 RNA SPEC QL NAA+PROBE: SIGNIFICANT CHANGE UP
HPIV4 RNA SPEC QL NAA+PROBE: SIGNIFICANT CHANGE UP
MCHC RBC-ENTMCNC: 29.4 PG — SIGNIFICANT CHANGE UP (ref 27–34)
MCHC RBC-ENTMCNC: 34.9 GM/DL — SIGNIFICANT CHANGE UP (ref 32–36)
MCV RBC AUTO: 84.3 FL — SIGNIFICANT CHANGE UP (ref 80–100)
NRBC # BLD: 0 /100 WBCS — SIGNIFICANT CHANGE UP (ref 0–0)
PLATELET # BLD AUTO: 438 K/UL — HIGH (ref 150–400)
POTASSIUM SERPL-MCNC: 4.1 MMOL/L — SIGNIFICANT CHANGE UP (ref 3.5–5.3)
POTASSIUM SERPL-SCNC: 4.1 MMOL/L — SIGNIFICANT CHANGE UP (ref 3.5–5.3)
PROT SERPL-MCNC: 6.5 G/DL — SIGNIFICANT CHANGE UP (ref 6–8.3)
RBC # BLD: 3.64 M/UL — LOW (ref 3.8–5.2)
RBC # FLD: 12.5 % — SIGNIFICANT CHANGE UP (ref 10.3–14.5)
RSV RNA SPEC QL NAA+PROBE: SIGNIFICANT CHANGE UP
RV+EV RNA SPEC QL NAA+PROBE: SIGNIFICANT CHANGE UP
SARS-COV-2 IGG SERPL QL IA: NEGATIVE — SIGNIFICANT CHANGE UP
SARS-COV-2 IGM SERPL IA-ACNC: 0.08 INDEX — SIGNIFICANT CHANGE UP
SARS-COV-2 RNA SPEC QL NAA+PROBE: SIGNIFICANT CHANGE UP
SODIUM SERPL-SCNC: 131 MMOL/L — LOW (ref 135–145)
T3 SERPL-MCNC: 80 NG/DL — SIGNIFICANT CHANGE UP (ref 80–200)
T4 AB SER-ACNC: 7.4 UG/DL — SIGNIFICANT CHANGE UP (ref 4.6–12)
TSH SERPL-MCNC: 1.4 UIU/ML — SIGNIFICANT CHANGE UP (ref 0.36–3.74)
URATE SERPL-MCNC: 3.3 MG/DL — SIGNIFICANT CHANGE UP (ref 2.5–7)
WBC # BLD: 7.06 K/UL — SIGNIFICANT CHANGE UP (ref 3.8–10.5)
WBC # FLD AUTO: 7.06 K/UL — SIGNIFICANT CHANGE UP (ref 3.8–10.5)

## 2020-08-29 PROCEDURE — 99233 SBSQ HOSP IP/OBS HIGH 50: CPT

## 2020-08-29 RX ORDER — SENNA PLUS 8.6 MG/1
2 TABLET ORAL AT BEDTIME
Refills: 0 | Status: DISCONTINUED | OUTPATIENT
Start: 2020-08-29 | End: 2020-08-30

## 2020-08-29 RX ORDER — POLYETHYLENE GLYCOL 3350 17 G/17G
17 POWDER, FOR SOLUTION ORAL DAILY
Refills: 0 | Status: DISCONTINUED | OUTPATIENT
Start: 2020-08-29 | End: 2020-08-30

## 2020-08-29 RX ADMIN — Medication 50 MICROGRAM(S): at 05:53

## 2020-08-29 RX ADMIN — POLYETHYLENE GLYCOL 3350 17 GRAM(S): 17 POWDER, FOR SOLUTION ORAL at 22:01

## 2020-08-29 RX ADMIN — TRAMADOL HYDROCHLORIDE 50 MILLIGRAM(S): 50 TABLET ORAL at 13:35

## 2020-08-29 RX ADMIN — APIXABAN 5 MILLIGRAM(S): 2.5 TABLET, FILM COATED ORAL at 05:53

## 2020-08-29 RX ADMIN — Medication 10 MILLIGRAM(S): at 17:53

## 2020-08-29 RX ADMIN — SENNA PLUS 2 TABLET(S): 8.6 TABLET ORAL at 22:02

## 2020-08-29 RX ADMIN — TIOTROPIUM BROMIDE 1 CAPSULE(S): 18 CAPSULE ORAL; RESPIRATORY (INHALATION) at 05:53

## 2020-08-29 RX ADMIN — Medication 10 MILLIGRAM(S): at 11:07

## 2020-08-29 RX ADMIN — BUDESONIDE AND FORMOTEROL FUMARATE DIHYDRATE 2 PUFF(S): 160; 4.5 AEROSOL RESPIRATORY (INHALATION) at 05:53

## 2020-08-29 RX ADMIN — BUDESONIDE AND FORMOTEROL FUMARATE DIHYDRATE 2 PUFF(S): 160; 4.5 AEROSOL RESPIRATORY (INHALATION) at 19:00

## 2020-08-29 RX ADMIN — MONTELUKAST 10 MILLIGRAM(S): 4 TABLET, CHEWABLE ORAL at 11:07

## 2020-08-29 RX ADMIN — TRAMADOL HYDROCHLORIDE 50 MILLIGRAM(S): 50 TABLET ORAL at 04:30

## 2020-08-29 RX ADMIN — TRAMADOL HYDROCHLORIDE 50 MILLIGRAM(S): 50 TABLET ORAL at 03:26

## 2020-08-29 RX ADMIN — APIXABAN 5 MILLIGRAM(S): 2.5 TABLET, FILM COATED ORAL at 17:53

## 2020-08-29 RX ADMIN — TRAMADOL HYDROCHLORIDE 50 MILLIGRAM(S): 50 TABLET ORAL at 14:35

## 2020-08-29 NOTE — PROGRESS NOTE ADULT - SUBJECTIVE AND OBJECTIVE BOX
Patient is a 65y old  Female who presents with a chief complaint of hyponatremia (29 Aug 2020 16:04)      INTERVAL HPI: Pt seen and examined. States she is doing better, still has some RLE soreness and weakness but improving. Denies any other acute complaints at this time.     OVERNIGHT EVENTS: none noted  T(F): 97 (08-29-20 @ 13:27), Max: 98.6 (08-28-20 @ 19:36)  HR: 71 (08-29-20 @ 13:27) (60 - 71)  BP: 112/72 (08-29-20 @ 13:27) (108/70 - 146/74)  RR: 18 (08-29-20 @ 13:27) (16 - 18)  SpO2: 96% (08-29-20 @ 13:27) (94% - 98%)  I&O's Summary      REVIEW OF SYSTEMS:  CONSTITUTIONAL: No fever, weight loss, or fatigue  EYES: No eye pain, visual disturbances, or discharge  ENMT:  No difficulty hearing, tinnitus, vertigo; No sinus or throat pain  NECK: No pain or stiffness  BREASTS: No pain, masses, or nipple discharge  RESPIRATORY: No cough, wheezing, chills or hemoptysis; No shortness of breath  CARDIOVASCULAR: No chest pain, palpitations, dizziness, or leg swelling  GASTROINTESTINAL: No abdominal or epigastric pain. No nausea, vomiting, or hematemesis; No diarrhea or constipation. No melena or hematochezia.  GENITOURINARY: No dysuria, frequency, hematuria, or incontinence  NEUROLOGICAL: No headaches, memory loss, loss of strength, numbness, or tremors  SKIN: No itching, burning, rashes, or lesions   LYMPH NODES: No enlarged glands  ENDOCRINE: No heat or cold intolerance; No hair loss  MUSCULOSKELETAL: No joint pain or swelling; No muscle, back, or extremity pain  PSYCHIATRIC: No depression, anxiety, mood swings, or difficulty sleeping  HEME/LYMPH: No easy bruising, or bleeding gums  ALLERY AND IMMUNOLOGIC: No hives or eczema    PHYSICAL EXAM:  GENERAL: NAD, well-groomed, well-developed  HEAD:  Atraumatic, Normocephalic  EYES: EOMI, PERRLA, conjunctiva and sclera clear  ENMT: No tonsillar erythema, exudates, or enlargement; Moist mucous membranes, Good dentition, No lesions  NECK: Supple, No JVD, Normal thyroid  NERVOUS SYSTEM:  Alert & Oriented X3, Good concentration; Motor Strength 5/5 B/L upper and lower extremities; DTRs 2+ intact and symmetric  CHEST/LUNG: Clear to percussion bilaterally; No rales, rhonchi, wheezing, or rubs  HEART: Regular rate and rhythm; No murmurs, rubs, or gallops  ABDOMEN: Soft, Nontender, Nondistended; Bowel sounds present  EXTREMITIES:  2+ Peripheral Pulses, No clubbing, cyanosis, or edema  LYMPH: No lymphadenopathy noted  SKIN: No rashes or lesions    LABS:                        10.7   7.06  )-----------( 438      ( 29 Aug 2020 07:04 )             30.7     08-29    131<L>  |  97  |  8   ----------------------------<  83  4.1   |  29  |  0.65    Ca    8.7      29 Aug 2020 07:04  Mg     1.8     08-28    TPro  6.5  /  Alb  3.2<L>  /  TBili  0.4  /  DBili  x   /  AST  25  /  ALT  26  /  AlkPhos  74  08-29        CAPILLARY BLOOD GLUCOSE                  MEDICATIONS  (STANDING):  ALBUTerol    90 MICROgram(s) HFA Inhaler 1 Puff(s) Inhalation every 4 hours  apixaban 5 milliGRAM(s) Oral every 12 hours  budesonide 160 MICROgram(s)/formoterol 4.5 MICROgram(s) Inhaler 2 Puff(s) Inhalation two times a day  diltiazem    milliGRAM(s) Oral daily  hydrALAZINE 10 milliGRAM(s) Oral four times a day  levothyroxine 50 MICROGram(s) Oral daily  montelukast 10 milliGRAM(s) Oral daily  tiotropium 18 MICROgram(s) Capsule 1 Capsule(s) Inhalation daily    MEDICATIONS  (PRN):  ALBUTerol    0.083% 2.5 milliGRAM(s) Nebulizer every 6 hours PRN Shortness of Breath and/or Wheezing  diazepam    Tablet 2 milliGRAM(s) Oral two times a day PRN anxiety  fluticasone propionate 50 MICROgram(s)/spray Nasal Spray 1 Spray(s) Both Nostrils two times a day PRN sinus congestion  traMADol 50 milliGRAM(s) Oral two times a day PRN Severe Pain (7 - 10) Patient is a 65y old  Female who presents with a chief complaint of hyponatremia (29 Aug 2020 16:04)      INTERVAL HPI: Pt seen and examined. States she is doing better, still has some RLE soreness and weakness but improving. Denies any other acute complaints at this time.     OVERNIGHT EVENTS: none noted  T(F): 97 (08-29-20 @ 13:27), Max: 98.6 (08-28-20 @ 19:36)  HR: 71 (08-29-20 @ 13:27) (60 - 71)  BP: 112/72 (08-29-20 @ 13:27) (108/70 - 146/74)  RR: 18 (08-29-20 @ 13:27) (16 - 18)  SpO2: 96% (08-29-20 @ 13:27) (94% - 98%)  I&O's Summary      REVIEW OF SYSTEMS:  CONSTITUTIONAL: No fever, weight loss, or fatigue  RESPIRATORY: No cough, wheezing, chills or hemoptysis; No shortness of breath  CARDIOVASCULAR: No chest pain, palpitations, dizziness, or leg swelling  GASTROINTESTINAL: No abdominal or epigastric pain. No nausea, vomiting, or hematemesis; No diarrhea or constipation. No melena or hematochezia.  GENITOURINARY: No dysuria, frequency, hematuria, or incontinence  NEUROLOGICAL: No headaches, memory loss, loss of strength, numbness, or tremors  SKIN: No itching, burning, rashes, or lesions   LYMPH NODES: No enlarged glands  ENDOCRINE: No heat or cold intolerance; No hair loss  MUSCULOSKELETAL: No joint pain or swelling; No muscle, back, or extremity pain except RLE weakness  PSYCHIATRIC: No depression, anxiety, mood swings, or difficulty sleeping      PHYSICAL EXAM:  GENERAL: NAD, well-groomed, well-developed  NERVOUS SYSTEM:  Alert & Oriented X3, Good concentration; Motor Strength 4/5 B/L upper and lower extremities;  except RLE 3/5 DTRs 2+ intact and symmetric  CHEST/LUNG: Clear to percussion bilaterally; No rales, rhonchi, wheezing, or rubs  HEART: Regular rate and rhythm; No murmurs, rubs, or gallops  ABDOMEN: Soft, Nontender, Nondistended; Bowel sounds present  EXTREMITIES:  2+ Peripheral Pulses, No clubbing, cyanosis, or edema  SKIN: No rashes or lesions    LABS:                        10.7   7.06  )-----------( 438      ( 29 Aug 2020 07:04 )             30.7     08-29    131<L>  |  97  |  8   ----------------------------<  83  4.1   |  29  |  0.65    Ca    8.7      29 Aug 2020 07:04  Mg     1.8     08-28    TPro  6.5  /  Alb  3.2<L>  /  TBili  0.4  /  DBili  x   /  AST  25  /  ALT  26  /  AlkPhos  74  08-29        CAPILLARY BLOOD GLUCOSE                  MEDICATIONS  (STANDING):  ALBUTerol    90 MICROgram(s) HFA Inhaler 1 Puff(s) Inhalation every 4 hours  apixaban 5 milliGRAM(s) Oral every 12 hours  budesonide 160 MICROgram(s)/formoterol 4.5 MICROgram(s) Inhaler 2 Puff(s) Inhalation two times a day  diltiazem    milliGRAM(s) Oral daily  hydrALAZINE 10 milliGRAM(s) Oral four times a day  levothyroxine 50 MICROGram(s) Oral daily  montelukast 10 milliGRAM(s) Oral daily  tiotropium 18 MICROgram(s) Capsule 1 Capsule(s) Inhalation daily    MEDICATIONS  (PRN):  ALBUTerol    0.083% 2.5 milliGRAM(s) Nebulizer every 6 hours PRN Shortness of Breath and/or Wheezing  diazepam    Tablet 2 milliGRAM(s) Oral two times a day PRN anxiety  fluticasone propionate 50 MICROgram(s)/spray Nasal Spray 1 Spray(s) Both Nostrils two times a day PRN sinus congestion  traMADol 50 milliGRAM(s) Oral two times a day PRN Severe Pain (7 - 10)

## 2020-08-29 NOTE — PROGRESS NOTE ADULT - PROBLEM SELECTOR PLAN 2
- pt with c/o myalgias, sinus congestion, wheezing on exam, improving  - suspect viral infection.   - pt currently afebrile, without leukocytosis   - CXR pending, COVID PCR pending. Check RVP  - c/w precautions pending above  - start albuterol nebs prn SOB/Wheezing.   -Pt w/ h/o Asthma , chronic sob currently at baseline, sating well on RA  - flonase prn   - monitor for fever. trend cbc

## 2020-08-29 NOTE — PROGRESS NOTE ADULT - PROBLEM SELECTOR PLAN 1
- pt presenting with generalized weakness,  na improved to 131  - likely 2/2 HCTZ use, dc hctz and monitor bp overnight  - avoid overcorrection  - Renal recs apprec

## 2020-08-29 NOTE — CONSULT NOTE ADULT - SUBJECTIVE AND OBJECTIVE BOX
NEPHROLOGY CONSULTATION    CHIEF COMPLAINT:     HPI:  Pt is 64 yo f w/pmh HTN, Asthma, Afib on eliquis, GERD, Osteopenia, sent to ED 8/28 due to hyponatremia on op labs. No chest pain, sick contacts, nausea, vomiting, diarrhea, abdominal pain. Admits to SOB (chronic and @ baseline), myalgias, and HA on and off. No F/C. Was on HCTZ as op.    ROS:  as above    Allergies:  No Known Allergies    PAST MEDICAL & SURGICAL HISTORY:  Hypertension  Afib  Hypothyroidism  Spinal stenosis  GERD (Gastroesophageal Reflux Disease)  Asthma: trigger getting a cold. hospitalized multipl  times last 2007 denies intubation  Osteopenia  Cervical Disc Displacement: current diagnosis  Cervical vertebral fusion  S/P Colonoscopy: 2005  wnl  S/P Foot Surgery: ORIF left foot  S/P Cholecystectomy: open   1989  Kyphosis  Termination of Pregnancy: x 3      SOCIAL HISTORY:  negative    FAMILY HISTORY:  Family history of cardiac disorder in father: unknown specifics  FH: myocardial infarction: brother    MEDICATIONS  (STANDING):  ALBUTerol    90 MICROgram(s) HFA Inhaler 1 Puff(s) Inhalation every 4 hours  apixaban 5 milliGRAM(s) Oral every 12 hours  budesonide 160 MICROgram(s)/formoterol 4.5 MICROgram(s) Inhaler 2 Puff(s) Inhalation two times a day  diltiazem    milliGRAM(s) Oral daily  hydrALAZINE 10 milliGRAM(s) Oral four times a day  levothyroxine 50 MICROGram(s) Oral daily  montelukast 10 milliGRAM(s) Oral daily  tiotropium 18 MICROgram(s) Capsule 1 Capsule(s) Inhalation daily    Home Medications:  budesonide-formoterol 160 mcg-4.5 mcg/inh inhalation aerosol: 2 puff(s) inhaled 2 times a day (28 Aug 2020 20:30)  diazePAM 2 mg oral tablet: 1 tab(s) orally 2 times a day, As Needed (28 Aug 2020 20:30)  dilTIAZem 360 mg/24 hours oral tablet, extended release: 1 tab(s) orally once a day (28 Aug 2020 20:30)  Eliquis 5 mg oral tablet: 1 tab(s) orally 2 times a day (28 Aug 2020 20:30)  hydrALAZINE 10 mg oral tablet: 1 tab(s) orally 4 times a day (28 Aug 2020 20:30)  hydralazine-hydrochlorothiazide 25 mg-25 mg oral capsule: 1.5 tab(s) orally once a day (28 Aug 2020 20:30)  hydroCHLOROthiazide 25 mg oral tablet: 1 tab(s) orally once a day (28 Aug 2020 20:30)  levothyroxine 50 mcg (0.05 mg) oral tablet: 1 tab(s) orally once a day (28 Aug 2020 20:30)  Singulair 10 mg oral tablet: 1 tab(s) orally once a day (28 Aug 2020 20:30)  Spiriva 18 mcg inhalation capsule: 1 cap(s) inhaled once a day (28 Aug 2020 20:30)  traMADol 50 mg oral tablet: 1 tab(s) orally 2 times a day, As Needed (28 Aug 2020 20:30)  valsartan 320 mg oral tablet: 1 tab(s) orally once a day (28 Aug 2020 20:30)    Vital Signs Last 24 Hrs  T(C): 36.1 (08-29-20 @ 13:27), Max: 37 (08-28-20 @ 19:36)  T(F): 97 (08-29-20 @ 13:27), Max: 98.6 (08-28-20 @ 19:36)  HR: 71 (08-29-20 @ 13:27) (60 - 71)  BP: 112/72 (08-29-20 @ 13:27) (108/70 - 146/74)  BP(mean): 94 (08-28-20 @ 22:35) (94 - 99)  RR: 18 (08-29-20 @ 13:27) (16 - 18)  SpO2: 96% (08-29-20 @ 13:27) (94% - 98%)    card s1s2  b/l air entry  soft, ND  no edema      LABS:                        10.7   7.06  )-----------( 438      ( 29 Aug 2020 07:04 )             30.7     08-29    131<L>  |  97  |  8   ----------------------------<  83  4.1   |  29  |  0.65    Ca    8.7      29 Aug 2020 07:04  Mg     1.8     08-28    TPro  6.5  /  Alb  3.2<L>  /  TBili  0.4  /  DBili  x   /  AST  25  /  ALT  26  /  AlkPhos  74  08-29    LIVER FUNCTIONS - ( 29 Aug 2020 07:04 )  Alb: 3.2 g/dL / Pro: 6.5 g/dL / ALK PHOS: 74 U/L / ALT: 26 U/L / AST: 25 U/L / GGT: x           A/P:    Hyponatremia due to HCTZ   Avoid HCTZ in future  Na has improved  No need for IVF  Ok to have low Na diet  BMP in am  Will follow    [phone] NEPHROLOGY CONSULTATION    CHIEF COMPLAINT: low Na    HPI:  Pt is 64 yo f w/pmh HTN, Asthma, Afib on eliquis, GERD, Osteopenia, sent to ED 8/28 due to hyponatremia on op labs. No chest pain, sick contacts, nausea, vomiting, diarrhea, abdominal pain. Admits to SOB (chronic and @ baseline), myalgias, and HA on and off. No F/C. Was on HCTZ as op. Awake, alert, denies complaints today.    ROS:  as above    Allergies:  No Known Allergies    PAST MEDICAL & SURGICAL HISTORY:  Hypertension  Afib  Hypothyroidism  Spinal stenosis  GERD (Gastroesophageal Reflux Disease)  Asthma: trigger getting a cold. hospitalized multipl  times last 2007 denies intubation  Osteopenia  Cervical Disc Displacement: current diagnosis  Cervical vertebral fusion  S/P Colonoscopy: 2005  wnl  S/P Foot Surgery: ORIF left foot  S/P Cholecystectomy: open   1989  Kyphosis  Termination of Pregnancy: x 3      SOCIAL HISTORY:  negative    FAMILY HISTORY:  Family history of cardiac disorder in father: unknown specifics  FH: myocardial infarction: brother    MEDICATIONS  (STANDING):  ALBUTerol    90 MICROgram(s) HFA Inhaler 1 Puff(s) Inhalation every 4 hours  apixaban 5 milliGRAM(s) Oral every 12 hours  budesonide 160 MICROgram(s)/formoterol 4.5 MICROgram(s) Inhaler 2 Puff(s) Inhalation two times a day  diltiazem    milliGRAM(s) Oral daily  hydrALAZINE 10 milliGRAM(s) Oral four times a day  levothyroxine 50 MICROGram(s) Oral daily  montelukast 10 milliGRAM(s) Oral daily  tiotropium 18 MICROgram(s) Capsule 1 Capsule(s) Inhalation daily    Home Medications:  budesonide-formoterol 160 mcg-4.5 mcg/inh inhalation aerosol: 2 puff(s) inhaled 2 times a day (28 Aug 2020 20:30)  diazePAM 2 mg oral tablet: 1 tab(s) orally 2 times a day, As Needed (28 Aug 2020 20:30)  dilTIAZem 360 mg/24 hours oral tablet, extended release: 1 tab(s) orally once a day (28 Aug 2020 20:30)  Eliquis 5 mg oral tablet: 1 tab(s) orally 2 times a day (28 Aug 2020 20:30)  hydrALAZINE 10 mg oral tablet: 1 tab(s) orally 4 times a day (28 Aug 2020 20:30)  hydralazine-hydrochlorothiazide 25 mg-25 mg oral capsule: 1.5 tab(s) orally once a day (28 Aug 2020 20:30)  hydroCHLOROthiazide 25 mg oral tablet: 1 tab(s) orally once a day (28 Aug 2020 20:30)  levothyroxine 50 mcg (0.05 mg) oral tablet: 1 tab(s) orally once a day (28 Aug 2020 20:30)  Singulair 10 mg oral tablet: 1 tab(s) orally once a day (28 Aug 2020 20:30)  Spiriva 18 mcg inhalation capsule: 1 cap(s) inhaled once a day (28 Aug 2020 20:30)  traMADol 50 mg oral tablet: 1 tab(s) orally 2 times a day, As Needed (28 Aug 2020 20:30)  valsartan 320 mg oral tablet: 1 tab(s) orally once a day (28 Aug 2020 20:30)    Vital Signs Last 24 Hrs  T(C): 36.1 (08-29-20 @ 13:27), Max: 37 (08-28-20 @ 19:36)  T(F): 97 (08-29-20 @ 13:27), Max: 98.6 (08-28-20 @ 19:36)  HR: 68 (08-29-20 @ 17:54) (60 - 71)  BP: 127/94 (08-29-20 @ 17:54) (108/70 - 146/74)  BP(mean): 94 (08-28-20 @ 22:35) (94 - 99)  RR: 18 (08-29-20 @ 13:27) (16 - 18)  SpO2: 96% (08-29-20 @ 13:27) (94% - 98%)    card s1s2  b/l air entry  soft, ND  no edema    LABS:                        10.7   7.06  )-----------( 438      ( 29 Aug 2020 07:04 )             30.7     08-29    131<L>  |  97  |  8   ----------------------------<  83  4.1   |  29  |  0.65    Ca    8.7      29 Aug 2020 07:04  Mg     1.8     08-28    TPro  6.5  /  Alb  3.2<L>  /  TBili  0.4  /  DBili  x   /  AST  25  /  ALT  26  /  AlkPhos  74  08-29    LIVER FUNCTIONS - ( 29 Aug 2020 07:04 )  Alb: 3.2 g/dL / Pro: 6.5 g/dL / ALK PHOS: 74 U/L / ALT: 26 U/L / AST: 25 U/L / GGT: x           A/P:    Hyponatremia due to HCTZ   Avoid HCTZ in future  Na has improved  No need for IVF  Ok to have low Na diet  BMP in am  Will follow    [phone]

## 2020-08-29 NOTE — PROGRESS NOTE ADULT - PROBLEM/PLAN-7
· Ibuprofen or Tylenol as directed for throat pains.  · Gargling with liquid ibuprofen may reduce throat pain.  · be sure to count the medicine swallowed as a partial dose   · Salt water gargles may help rinse mucus and sooth throat pain.  · Sore throat sprays may provide short term relief, especially at meal or bed time.  · Antihistamines like Allegra or Zyrtec may help your body not react as strongly to being ill.  · Avoid contact with others, Do not share silverware, food, glasses as illnesses are generally passed through saliva.   DISPLAY PLAN FREE TEXT

## 2020-08-29 NOTE — PROGRESS NOTE ADULT - PROBLEM SELECTOR PLAN 4
- chronic, stable  - currently sinus bradycardia with 1st degree av block on EKG  - c/w cardizem   - c/w eliquis

## 2020-08-30 ENCOUNTER — TRANSCRIPTION ENCOUNTER (OUTPATIENT)
Age: 65
End: 2020-08-30

## 2020-08-30 VITALS
HEART RATE: 71 BPM | RESPIRATION RATE: 17 BRPM | DIASTOLIC BLOOD PRESSURE: 66 MMHG | TEMPERATURE: 98 F | SYSTOLIC BLOOD PRESSURE: 114 MMHG | OXYGEN SATURATION: 98 %

## 2020-08-30 LAB
ALBUMIN SERPL ELPH-MCNC: 3.3 G/DL — SIGNIFICANT CHANGE UP (ref 3.3–5)
ALP SERPL-CCNC: 76 U/L — SIGNIFICANT CHANGE UP (ref 40–120)
ALT FLD-CCNC: 23 U/L — SIGNIFICANT CHANGE UP (ref 12–78)
ANION GAP SERPL CALC-SCNC: 5 MMOL/L — SIGNIFICANT CHANGE UP (ref 5–17)
AST SERPL-CCNC: 22 U/L — SIGNIFICANT CHANGE UP (ref 15–37)
BASOPHILS # BLD AUTO: 0.03 K/UL — SIGNIFICANT CHANGE UP (ref 0–0.2)
BASOPHILS NFR BLD AUTO: 0.3 % — SIGNIFICANT CHANGE UP (ref 0–2)
BILIRUB SERPL-MCNC: 0.4 MG/DL — SIGNIFICANT CHANGE UP (ref 0.2–1.2)
BUN SERPL-MCNC: 9 MG/DL — SIGNIFICANT CHANGE UP (ref 7–23)
CALCIUM SERPL-MCNC: 8.7 MG/DL — SIGNIFICANT CHANGE UP (ref 8.5–10.1)
CHLORIDE SERPL-SCNC: 101 MMOL/L — SIGNIFICANT CHANGE UP (ref 96–108)
CO2 SERPL-SCNC: 28 MMOL/L — SIGNIFICANT CHANGE UP (ref 22–31)
CREAT SERPL-MCNC: 0.67 MG/DL — SIGNIFICANT CHANGE UP (ref 0.5–1.3)
EOSINOPHIL # BLD AUTO: 0.08 K/UL — SIGNIFICANT CHANGE UP (ref 0–0.5)
EOSINOPHIL NFR BLD AUTO: 0.8 % — SIGNIFICANT CHANGE UP (ref 0–6)
GLUCOSE SERPL-MCNC: 87 MG/DL — SIGNIFICANT CHANGE UP (ref 70–99)
HCT VFR BLD CALC: 34.3 % — LOW (ref 34.5–45)
HGB BLD-MCNC: 11.6 G/DL — SIGNIFICANT CHANGE UP (ref 11.5–15.5)
IMM GRANULOCYTES NFR BLD AUTO: 0.5 % — SIGNIFICANT CHANGE UP (ref 0–1.5)
LYMPHOCYTES # BLD AUTO: 2.37 K/UL — SIGNIFICANT CHANGE UP (ref 1–3.3)
LYMPHOCYTES # BLD AUTO: 24.6 % — SIGNIFICANT CHANGE UP (ref 13–44)
MCHC RBC-ENTMCNC: 28.9 PG — SIGNIFICANT CHANGE UP (ref 27–34)
MCHC RBC-ENTMCNC: 33.8 GM/DL — SIGNIFICANT CHANGE UP (ref 32–36)
MCV RBC AUTO: 85.3 FL — SIGNIFICANT CHANGE UP (ref 80–100)
MONOCYTES # BLD AUTO: 0.93 K/UL — HIGH (ref 0–0.9)
MONOCYTES NFR BLD AUTO: 9.6 % — SIGNIFICANT CHANGE UP (ref 2–14)
NEUTROPHILS # BLD AUTO: 6.18 K/UL — SIGNIFICANT CHANGE UP (ref 1.8–7.4)
NEUTROPHILS NFR BLD AUTO: 64.2 % — SIGNIFICANT CHANGE UP (ref 43–77)
NRBC # BLD: 0 /100 WBCS — SIGNIFICANT CHANGE UP (ref 0–0)
PLATELET # BLD AUTO: 463 K/UL — HIGH (ref 150–400)
POTASSIUM SERPL-MCNC: 3.9 MMOL/L — SIGNIFICANT CHANGE UP (ref 3.5–5.3)
POTASSIUM SERPL-SCNC: 3.9 MMOL/L — SIGNIFICANT CHANGE UP (ref 3.5–5.3)
PROT SERPL-MCNC: 6.7 G/DL — SIGNIFICANT CHANGE UP (ref 6–8.3)
RBC # BLD: 4.02 M/UL — SIGNIFICANT CHANGE UP (ref 3.8–5.2)
RBC # FLD: 12.8 % — SIGNIFICANT CHANGE UP (ref 10.3–14.5)
SODIUM SERPL-SCNC: 134 MMOL/L — LOW (ref 135–145)
WBC # BLD: 9.64 K/UL — SIGNIFICANT CHANGE UP (ref 3.8–10.5)
WBC # FLD AUTO: 9.64 K/UL — SIGNIFICANT CHANGE UP (ref 3.8–10.5)

## 2020-08-30 PROCEDURE — 83735 ASSAY OF MAGNESIUM: CPT

## 2020-08-30 PROCEDURE — 86769 SARS-COV-2 COVID-19 ANTIBODY: CPT

## 2020-08-30 PROCEDURE — 36415 COLL VENOUS BLD VENIPUNCTURE: CPT

## 2020-08-30 PROCEDURE — 94640 AIRWAY INHALATION TREATMENT: CPT

## 2020-08-30 PROCEDURE — 84550 ASSAY OF BLOOD/URIC ACID: CPT

## 2020-08-30 PROCEDURE — 99239 HOSP IP/OBS DSCHRG MGMT >30: CPT

## 2020-08-30 PROCEDURE — U0003: CPT

## 2020-08-30 PROCEDURE — 84480 ASSAY TRIIODOTHYRONINE (T3): CPT

## 2020-08-30 PROCEDURE — 99222 1ST HOSP IP/OBS MODERATE 55: CPT

## 2020-08-30 PROCEDURE — 71045 X-RAY EXAM CHEST 1 VIEW: CPT

## 2020-08-30 PROCEDURE — 85027 COMPLETE CBC AUTOMATED: CPT

## 2020-08-30 PROCEDURE — 84436 ASSAY OF TOTAL THYROXINE: CPT

## 2020-08-30 PROCEDURE — 84443 ASSAY THYROID STIM HORMONE: CPT

## 2020-08-30 PROCEDURE — 99285 EMERGENCY DEPT VISIT HI MDM: CPT | Mod: 25

## 2020-08-30 PROCEDURE — 80048 BASIC METABOLIC PNL TOTAL CA: CPT

## 2020-08-30 PROCEDURE — 93005 ELECTROCARDIOGRAM TRACING: CPT

## 2020-08-30 PROCEDURE — 80053 COMPREHEN METABOLIC PANEL: CPT

## 2020-08-30 PROCEDURE — 87633 RESP VIRUS 12-25 TARGETS: CPT

## 2020-08-30 RX ORDER — HYDRALAZINE HCL 50 MG
1 TABLET ORAL
Qty: 28 | Refills: 0
Start: 2020-08-30 | End: 2020-09-12

## 2020-08-30 RX ORDER — POLYETHYLENE GLYCOL 3350 17 G/17G
17 POWDER, FOR SOLUTION ORAL
Qty: 170 | Refills: 0
Start: 2020-08-30 | End: 2020-09-08

## 2020-08-30 RX ORDER — HYDRALAZINE HCL 50 MG
1 TABLET ORAL
Qty: 120 | Refills: 0
Start: 2020-08-30 | End: 2020-09-28

## 2020-08-30 RX ORDER — VALSARTAN 80 MG/1
1 TABLET ORAL
Qty: 0 | Refills: 0 | DISCHARGE

## 2020-08-30 RX ORDER — ACETAMINOPHEN 500 MG
650 TABLET ORAL EVERY 6 HOURS
Refills: 0 | Status: DISCONTINUED | OUTPATIENT
Start: 2020-08-30 | End: 2020-08-30

## 2020-08-30 RX ORDER — HYDRALAZINE/HYDROCHLOROTHIAZID 50 MG-50MG
1.5 CAPSULE ORAL
Qty: 0 | Refills: 0 | DISCHARGE

## 2020-08-30 RX ORDER — AMLODIPINE BESYLATE 2.5 MG/1
1 TABLET ORAL
Qty: 14 | Refills: 0
Start: 2020-08-30 | End: 2020-09-12

## 2020-08-30 RX ORDER — HYDRALAZINE HCL 50 MG
1 TABLET ORAL
Qty: 0 | Refills: 0 | DISCHARGE

## 2020-08-30 RX ORDER — PANTOPRAZOLE SODIUM 20 MG/1
1 TABLET, DELAYED RELEASE ORAL
Qty: 30 | Refills: 0
Start: 2020-08-30 | End: 2020-09-28

## 2020-08-30 RX ORDER — HYDRALAZINE HCL 50 MG
1 TABLET ORAL
Qty: 30 | Refills: 0
Start: 2020-08-30 | End: 2020-09-28

## 2020-08-30 RX ADMIN — Medication 2 MILLIGRAM(S): at 00:41

## 2020-08-30 RX ADMIN — Medication 50 MICROGRAM(S): at 05:59

## 2020-08-30 RX ADMIN — Medication 650 MILLIGRAM(S): at 13:19

## 2020-08-30 RX ADMIN — Medication 10 MILLIGRAM(S): at 05:59

## 2020-08-30 RX ADMIN — Medication 10 MILLIGRAM(S): at 12:49

## 2020-08-30 RX ADMIN — Medication 40 MILLIGRAM(S): at 12:49

## 2020-08-30 RX ADMIN — Medication 360 MILLIGRAM(S): at 05:59

## 2020-08-30 RX ADMIN — Medication 10 MILLIGRAM(S): at 00:33

## 2020-08-30 RX ADMIN — TIOTROPIUM BROMIDE 1 CAPSULE(S): 18 CAPSULE ORAL; RESPIRATORY (INHALATION) at 06:00

## 2020-08-30 RX ADMIN — APIXABAN 5 MILLIGRAM(S): 2.5 TABLET, FILM COATED ORAL at 05:59

## 2020-08-30 RX ADMIN — MONTELUKAST 10 MILLIGRAM(S): 4 TABLET, CHEWABLE ORAL at 12:49

## 2020-08-30 RX ADMIN — Medication 650 MILLIGRAM(S): at 12:50

## 2020-08-30 RX ADMIN — BUDESONIDE AND FORMOTEROL FUMARATE DIHYDRATE 2 PUFF(S): 160; 4.5 AEROSOL RESPIRATORY (INHALATION) at 05:59

## 2020-08-30 NOTE — DISCHARGE NOTE NURSING/CASE MANAGEMENT/SOCIAL WORK - PATIENT PORTAL LINK FT
You can access the FollowMyHealth Patient Portal offered by [facility] by registering at the following website: [email or web address]. By joining [employer]’s FollowMyHealth portal, you will also be able to view your health information using other applications (apps) compatible with our system.

## 2020-08-30 NOTE — CONSULT NOTE ADULT - ASSESSMENT
64 yo f pmh HTN, Asthma, Afib on eliquis, GERD, Osteopenia,  presents with complaint of abnormal labs, found to be hyponatremic;    - OK with D/c hydrochlorothiazide for hyponatremia  - She is on hydralazine 10 QID and not taking it reliably  - This is going to change to 25 bid.  She was told that she can take an extra 25 for spikes in her BP  - Her HR is controlled on diltiazem 360 QD and she will continue it  - Continue full dose AC with Eliquis  - Monitor and replete lytes  - Outpatient follow up with DR. Fuchs.

## 2020-08-30 NOTE — PROGRESS NOTE ADULT - ASSESSMENT
6 yo f pmh HTN, Asthma, Afib on eliquis, GERD, Osteopenia,  presents with abnormal outpatient labs , found to be hyponatremic, also with suspected viral infection.       Hyponatremia.    pt presenting with generalized weakness,  na improved to 134  likely 2/2 HCTZ use, dc hctz and monitor bp overnight   Renal recs apprec.     Asthma Exacerbation  Patient not hypoxemic on exam. wheezing bilateraly. on symbicort  CXR clear  on room air  RVP and Covid negative  c/w symbicort and nebs only with tiotropium  give one dose of IV steroids now  on discharge : send home with steroid taper      Hypertensive Urgency  - h/o hypertensive urgency on multiple antihypertensive meds  -currently valsartan and hctz on hold. patient on hydrazaline and diltizem  -will discuss what medicaiton to  start with patient on discharge, with nephro. will likely need to restart valsartan     Afib.    Plan: - chronic, stable  - currently sinus bradycardia with 1st degree av block on EKG  - c/w cardizem   - c/w eliquis.       Hypothyroidism.   - tsh wnl  - c/w synthroid.       Tremors: likely essential. r/o Parkinsons  Neuro consulted: otherwise will need outpatient followup     Problem/Plan - 7:  ·  Problem: Prophylactic measure.  Plan: VTE ppx: therapeutic on eliquis.     Dispo: Home based on nephro and neuro recs. discharge < 24 hours
66 yo f pmh HTN, Asthma, Afib on eliquis, GERD, Osteopenia,  presents with abnormal outpatient labs , found to be hyponatremic, also with suspected viral infection.

## 2020-08-30 NOTE — DISCHARGE NOTE PROVIDER - CARE PROVIDER_API CALL
Sesar Fuchs)  Cardiovascular Disease; Internal Medicine  [address] [place] [address]  Phone: [phone]  Fax: [phone]  Follow Up Time: 1 week    Cody Roy  [facility]  [address], [address]  [place] [address]  Phone: [phone]  Fax: [phone]  Follow Up Time: 1 week    Beltran Mendez  NEUROLOGY  [address] [place] [address]  Phone: [phone]  Fax: [phone]  Follow Up Time: 1 week

## 2020-08-30 NOTE — PROGRESS NOTE ADULT - SUBJECTIVE AND OBJECTIVE BOX
Patient seen and examined at bedside.   BP last night elevated 178 systolic  this morning, patient continues with resting tremors (reports has had these for over 6 months)  Vitals this am otherwise improved. with systolic at 124  Wheezing on exam: patient on symbicort    Review of Systems:  General:denies fever chills, headache, weakness  HEENT: denies blurry vision,diffculty swallowing, difficulty hearing, tinnitus  Cardiovascular: denies chest pain  ,palpitations  Pulmonary:denies shortness of breath, cough, +++wheezing, hemoptysis  Gastrointestinal: denies abdominal pain, constipation, diarrhea,nausea , vomiting, hematochezia  : denies hematuria, dysuria, or incontinence  Neurological: denies weakness, numbness , tingling, dizziness, +++ tremors  MSK: denies muscle pain, difficulty ambulating, swelling, back pain  skin: denies skin rash, itching, burning, or  skin lesions  Psychiatrical: denies mood disturbances, anxierty, feeling depressed, depression , or difficulty sleeping    Objective:  Vitals  T(C): 36.9 (08-30-20 @ 05:03), Max: 36.9 (08-30-20 @ 05:03)  HR: 77 (08-30-20 @ 05:03) (68 - 100)  BP: 157/83 (08-30-20 @ 05:03) (112/72 - 178/80)  RR: 17 (08-30-20 @ 05:03) (17 - 19)  SpO2: 97% (08-30-20 @ 05:03) (96% - 98%)    Physical Exam:  General: comfortable, no acute distress, well nourished  HEENT: Atraumatic, no LAD, trachea midline, PERRLA  Cardiovascular: normal s1s2, no murmurs, gallops or fricition rubs  Pulmonary: +++bilateral expiratorty wheezing , no rhonchi  Gastrointestinal: soft non tender non distended, no masses felt, no organomegally  Muscloskeletal: no lower extremity edema, intact bilateral lower extremity pulses  Neurological: CN II-12 intact. No focal weakness, +++tremors  Psychiatrical: normal mood, cooperative  SKIN: no rash, lesions or ulcers    Labs:                          11.6   9.64  )-----------( 463      ( 30 Aug 2020 05:59 )             34.3     08-30    134<L>  |  101  |  9   ----------------------------<  87  3.9   |  28  |  0.67    Ca    8.7      30 Aug 2020 05:59  Mg     1.8     08-28    TPro  6.7  /  Alb  3.3  /  TBili  0.4  /  DBili  x   /  AST  22  /  ALT  23  /  AlkPhos  76  08-30    LIVER FUNCTIONS - ( 30 Aug 2020 05:59 )  Alb: 3.3 g/dL / Pro: 6.7 g/dL / ALK PHOS: 76 U/L / ALT: 23 U/L / AST: 22 U/L / GGT: x                 Active Medications  MEDICATIONS  (STANDING):  ALBUTerol    90 MICROgram(s) HFA Inhaler 1 Puff(s) Inhalation every 4 hours  apixaban 5 milliGRAM(s) Oral every 12 hours  budesonide 160 MICROgram(s)/formoterol 4.5 MICROgram(s) Inhaler 2 Puff(s) Inhalation two times a day  diltiazem    milliGRAM(s) Oral daily  hydrALAZINE 10 milliGRAM(s) Oral four times a day  levothyroxine 50 MICROGram(s) Oral daily  methylPREDNISolone sodium succinate Injectable 40 milliGRAM(s) IV Push once  montelukast 10 milliGRAM(s) Oral daily  senna 2 Tablet(s) Oral at bedtime  tiotropium 18 MICROgram(s) Capsule 1 Capsule(s) Inhalation daily    MEDICATIONS  (PRN):  acetaminophen   Tablet .. 650 milliGRAM(s) Oral every 6 hours PRN Mild Pain (1 - 3)  ALBUTerol    0.083% 2.5 milliGRAM(s) Nebulizer every 6 hours PRN Shortness of Breath and/or Wheezing  diazepam    Tablet 2 milliGRAM(s) Oral two times a day PRN anxiety  fluticasone propionate 50 MICROgram(s)/spray Nasal Spray 1 Spray(s) Both Nostrils two times a day PRN sinus congestion  polyethylene glycol 3350 17 Gram(s) Oral daily PRN Constipation  traMADol 50 milliGRAM(s) Oral two times a day PRN Severe Pain (7 - 10)

## 2020-08-30 NOTE — DISCHARGE NOTE PROVIDER - NSDCCPCAREPLAN_GEN_ALL_CORE_FT
PRINCIPAL DISCHARGE DIAGNOSIS  Diagnosis: Hyponatremia  Assessment and Plan of Treatment: Your salt levels were low due to Two of your blood pressure medications. we have DISCONTINUED those medication:  1. Valsartan  2. Hydrochlorothiazide.  Your salt level now is has improved at 134.  Please followup with our kidney specialists. Number is  provided      SECONDARY DISCHARGE DIAGNOSES  Diagnosis: Asthma  Assessment and Plan of Treatment: You are wheezing on exam.  We will be sending you home on a steroid taper for the few days.  Please ensure you TAKE the ANTACID we have given you daily to prevent a STOMACH Ulcer  also please take the steroids WITH FOOD    Diagnosis: Tremors of nervous system  Assessment and Plan of Treatment: Your tremors have been a long standing concern. We have given you contact information for a NEUROLOGIST    Diagnosis: Hypertension  Assessment and Plan of Treatment: Your blood pressure is well controlled on HYDRAZALINE and CARDIZEM.  we have stopped two medication, and now therefore changing your regime to:  1. HYDRZALINE 25mg every 12 HOURS  2. CARDIZEM in the mornin  3. NORVASC 10 at night if your Blood pressure >140  Please followup with your HEART DOCTOR (Dr. FIORE

## 2020-08-30 NOTE — CONSULT NOTE ADULT - SUBJECTIVE AND OBJECTIVE BOX
[facility] - Hamzah Fuchs, Clementine, Natty, Caio, Lorene Baez  Office Number: [phone]    Initial Consult Note    CHIEF COMPLAINT: Patient is a 65y old  Female who presents with a chief complaint of hyponatremia       HPI:  66 yo f pmh HTN, Asthma, Afib on eliquis, GERD, Osteopenia,  presents with complaint of abnormal labs. Pt states back in september of this past year she was diagnosed with HTN. She had never previously been hypertensive. She has seen multiple specialists and the etiology of her hypertension has not been identified and it has been difficult to control. Pt was admitted in April for hypertension found to be in new onset afib. These past 3 days pt states she has had episodes of hypertension overnight- BP max 189/90. She takes her medications and by morning the BP has improved. She states when she becomes hypertensive she also develops "shaking". Over the past several days pt also has some sinus congestion, sore throat and myalgias. She believes this is unrelated and she has just had a cold. She was evaluated by her nephrologist on 8/20 and had lab work performed. She was called today and advised to present to the ER due to hyponatremia.   Pt denies chest pain, sick contacts, nausea, vomiting, diarrhea, abdominal pain. Admits to SOB (chronic and @ baseline), myalgias, and headachess    In the ED CBC, CMP, Mg, CTA abd/pelv were performed. Significant for H&H 10.4/29.7, Na 122. CTA with no evidence of renal artery stenosis. Pt was started on NS @ 125 cc/hr. Dr. Roy Renal was consulted. /78 -> 134/81. Remainder of vitals WNL.     per Dr. Fuchs's last note, she was supposed to be on metoprolol.  She has not been taking it here, and her HR and BP are acceptable.  HCTZ has caused hyponatremia, and is being stopped.  She is only taking hydralazine 10 QID. At home, she reports that at times she only takes it BID.      PAST MEDICAL & SURGICAL HISTORY:  Hypertension  Afib  Hypothyroidism  Spinal stenosis  GERD (Gastroesophageal Reflux Disease)  Asthma: trigger getting a cold. hospitalized multipl  times last 2007 denies intubation  Osteopenia  Cervical Disc Displacement: current diagnosis  Cervical vertebral fusion  S/P Colonoscopy: 2005  wnl  S/P Foot Surgery: ORIF left foot  S/P Cholecystectomy: open   1989  Kyphosis  Termination of Pregnancy: x3  remote      SOCIAL HISTORY:  No tobacco, ethanol, or drug abuse.    FAMILY HISTORY:  Family history of cardiac disorder in father: unknown specifics  FH: myocardial infarction: brother      MEDICATIONS  (STANDING):  ALBUTerol    90 MICROgram(s) HFA Inhaler 1 Puff(s) Inhalation every 4 hours  apixaban 5 milliGRAM(s) Oral every 12 hours  budesonide 160 MICROgram(s)/formoterol 4.5 MICROgram(s) Inhaler 2 Puff(s) Inhalation two times a day  diltiazem    milliGRAM(s) Oral daily  hydrALAZINE 10 milliGRAM(s) Oral four times a day  levothyroxine 50 MICROGram(s) Oral daily  montelukast 10 milliGRAM(s) Oral daily  senna 2 Tablet(s) Oral at bedtime  tiotropium 18 MICROgram(s) Capsule 1 Capsule(s) Inhalation daily    MEDICATIONS  (PRN):  acetaminophen   Tablet .. 650 milliGRAM(s) Oral every 6 hours PRN Mild Pain (1 - 3)  ALBUTerol    0.083% 2.5 milliGRAM(s) Nebulizer every 6 hours PRN Shortness of Breath and/or Wheezing  diazepam    Tablet 2 milliGRAM(s) Oral two times a day PRN anxiety  fluticasone propionate 50 MICROgram(s)/spray Nasal Spray 1 Spray(s) Both Nostrils two times a day PRN sinus congestion  polyethylene glycol 3350 17 Gram(s) Oral daily PRN Constipation  traMADol 50 milliGRAM(s) Oral two times a day PRN Severe Pain (7 - 10)      Allergies    No Known Allergies          REVIEW OF SYSTEMS:    All other review of systems is negative unless indicated above    VITAL SIGNS:   Vital Signs Last 24 Hrs  T(C): 36.9 (30 Aug 2020 05:03), Max: 36.9 (30 Aug 2020 05:03)  T(F): 98.4 (30 Aug 2020 05:03), Max: 98.4 (30 Aug 2020 05:03)  HR: 65 (30 Aug 2020 11:50) (65 - 100)  BP: 127/59 (30 Aug 2020 11:50) (112/72 - 178/80)  BP(mean): --  RR: 17 (30 Aug 2020 05:03) (17 - 19)  SpO2: 97% (30 Aug 2020 05:03) (96% - 98%)    I&O's Summary      On Exam:    Constitutional: NAD, alert and oriented x 3  Lungs:  Non-labored, breath sounds are clear bilaterally, No wheezing, rales or rhonchi  Cardiovascular: RRR.  S1 and S2 positive.  No murmurs, rubs, gallops or clicks  Gastrointestinal: Bowel Sounds present, soft, nontender.   Lymph: No peripheral edema. No cervical lymphadenopathy.  Neurological: Alert, no focal deficits  Skin: No rashes or ulcers   Psych:  Mood & affect appropriate.    LABS: All Labs Reviewed:                        11.6   9.64  )-----------( 463      ( 30 Aug 2020 05:59 )             34.3                         10.7   7.06  )-----------( 438      ( 29 Aug 2020 07:04 )             30.7                         10.4   10.17 )-----------( 410      ( 28 Aug 2020 16:52 )             29.7     30 Aug 2020 05:59    134    |  101    |  9      ----------------------------<  87     3.9     |  28     |  0.67   29 Aug 2020 07:04    131    |  97     |  8      ----------------------------<  83     4.1     |  29     |  0.65   28 Aug 2020 21:30    125    |  92     |  7      ----------------------------<  89     3.8     |  26     |  0.54     Ca    8.7        30 Aug 2020 05:59  Ca    8.7        29 Aug 2020 07:04  Ca    8.5        28 Aug 2020 21:30  Mg     1.8       28 Aug 2020 16:52    TPro  6.7    /  Alb  3.3    /  TBili  0.4    /  DBili  x      /  AST  22     /  ALT  23     /  AlkPhos  76     30 Aug 2020 05:59  TPro  6.5    /  Alb  3.2    /  TBili  0.4    /  DBili  x      /  AST  25     /  ALT  26     /  AlkPhos  74     29 Aug 2020 07:04  TPro  6.6    /  Alb  3.4    /  TBili  0.4    /  DBili  x      /  AST  26     /  ALT  26     /  AlkPhos  75     28 Aug 2020 16:52          Blood Culture:     08-29 @ 07:04  TSH: 1.40      RADIOLOGY:    EKG:

## 2020-08-30 NOTE — DISCHARGE NOTE PROVIDER - CARE PROVIDERS DIRECT ADDRESSES
,[email or web address],DirectAddress_Unknown,DirectAddress_Unknown

## 2020-08-30 NOTE — DISCHARGE NOTE PROVIDER - HOSPITAL COURSE
66 yo f pmh HTN, Asthma, Afib on eliquis, GERD, Osteopenia,  presents with abnormal outpatient labs , found to be hyponatremic, also with suspected viral infection.            Problem/Plan - 1:    ·  Problem: Hyponatremia.  Plan: - pt presenting with generalized weakness,  na improved to 131    - likely 2/2 HCTZ and valsartan use    - Renal recs apprec.          Problem/Plan - 2:    ·  Problem: Viral infection.  Plan: - pt with c/o myalgias, sinus congestion, wheezing on exam, improving    - suspect viral infection.     - pt currently afebrile, without leukocytosis     - CXR pending, COVID PCR pending. Check RVP    - c/w precautions pending above    - start albuterol nebs prn SOB/Wheezing.     -Pt w/ h/o Asthma , chronic sob currently at baseline, sating well on RA    - flonase prn     - monitor for fever. trend cbc.          Problem/Plan - 3:    ·  Problem: Hypertension.  Plan: - bp currently wnl     - h/o hypertensive urgency on multiple antihypertensive meds    - hold hctz in setting of hyponatremia    - c/w hydralazine, cardizem. + adding norvasc.         Problem/Plan - 4:    ·  Problem: Afib.  Plan: - chronic, stable    - currently sinus bradycardia with 1st degree av block on EKG    - c/w cardizem     - c/w eliquis.          Problem/Plan - 5:    ·  Problem: Hypothyroidism.  Plan: - chronic, stable    - tsh wnl    - c/w synthroid.          Problem/Plan - 6:    Problem: Osteopenia. Plan: s/p multiple prior surgeries with h/o chronic pain    - c/w tramadol.         Problem/Plan - 7:    ·  Problem: Prophylactic measure.  Plan: VTE ppx: therapeutic on eliquis.             At this time patient is medically stable for discharge.  Discussed case  with patient, daughter (Corinne). Patient is strongly advised to followup with her cardiologist Dr. Fuchs for Blood pressure control).    Due to low sodium, primary team has dced her valsartan and hctz. she will need to ultimately be placed on new medication, norvasc to be taken at night, should her BP  > 160        will be sending her home on hydrazaline 25 q12h as well.        I have given her Followup with Nephro/Neuro/and Cardiology            Greater than 30 minutes spent face to face encounter on discharge planning including care coordination planning, disposition and medication reconcillation 64 yo f pmh HTN, Asthma, Afib on eliquis, GERD, Osteopenia,  presents with abnormal outpatient labs , found to be hyponatremic, also with suspected viral infection.            Problem/Plan - 1:    ·  Problem: Hyponatremia.  Plan: - pt presenting with generalized weakness,  na improved to 134    - likely 2/2 HCTZ and valsartan use    - Renal recs apprec.          Problem/Plan - 2:    ·  Problem: Viral infection.  Plan: - pt with c/o myalgias, sinus congestion, wheezing on exam, improving    - suspect viral infection.     - pt currently afebrile, without leukocytosis     CXR negative COVID negative, Viral panel [id number]    - start albuterol nebs prn SOB/Wheezing.     -Pt w/ h/o Asthma , chronic sob currently at baseline, sating well on RA. SENDING PATIENT HOME ON PRED  TAPER +++ PROTONIX FOR GI PPX                 Problem/Plan - 3:    ·  Problem: Hypertension.  Plan: - bp currently wnl     - h/o hypertensive urgency on multiple antihypertensive meds    - hold hctz in setting of hyponatremia    - c/w hydralazine, cardizem. + adding NIGHT TIME HYDRAZALINE PRN         Problem/Plan - 4:    ·  Problem: Afib.  Plan: - chronic, stable    - currently sinus bradycardia with 1st degree av block on EKG    - c/w cardizem     - c/w eliquis.          Problem/Plan - 5:    ·  Problem: Hypothyroidism.  Plan: - chronic, stable    - tsh wnl    - c/w synthroid.          Problem/Plan - 6:    Problem: Osteopenia. Plan: s/p multiple prior surgeries with h/o chronic pain    - c/w tramadol.         Problem/Plan - 7:    ·  Problem: Prophylactic measure.  Plan: VTE ppx: therapeutic on eliquis.             At this time patient is medically stable for discharge.  Discussed case  with patient, daughter (Corinne). Patient is strongly advised to followup with her cardiologist Dr. Fuchs for Blood pressure control).    Due to low sodium, primary team has dced her valsartan and hctz. she will need to ultimately be placed on Hydrazaline 25to be taken at night, should her BP  > 140        will be sending her home on hydrazaline 25 q12h as well.        I have given her Followup with Nephro/Neuro/and Cardiology            Greater than 30 minutes spent face to face encounter on discharge planning including care coordination planning, disposition and medication reconcillation

## 2020-08-30 NOTE — DISCHARGE NOTE PROVIDER - NSDCMRMEDTOKEN_GEN_ALL_CORE_FT
budesonide-formoterol 160 mcg-4.5 mcg/inh inhalation aerosol: 2 puff(s) inhaled 2 times a day  diazePAM 2 mg oral tablet: 1 tab(s) orally 2 times a day, As Needed  dilTIAZem 360 mg/24 hours oral tablet, extended release: 1 tab(s) orally once a day  Eliquis 5 mg oral tablet: 1 tab(s) orally 2 times a day  hydrALAZINE 10 mg oral tablet: 1 tab(s) orally every 6 hours   levothyroxine 50 mcg (0.05 mg) oral tablet: 1 tab(s) orally once a day  MiraLax oral powder for reconstitution: 17 gram(s) orally once a day, As needed, Constipation  Singulair 10 mg oral tablet: 1 tab(s) orally once a day  Spiriva 18 mcg inhalation capsule: 1 cap(s) inhaled once a day  traMADol 50 mg oral tablet: 1 tab(s) orally 2 times a day, As Needed  valsartan 320 mg oral tablet: 1 tab(s) orally once a day budesonide-formoterol 160 mcg-4.5 mcg/inh inhalation aerosol: 2 puff(s) inhaled 2 times a day  diazePAM 2 mg oral tablet: 1 tab(s) orally 2 times a day, As Needed  dilTIAZem 360 mg/24 hours oral tablet, extended release: 1 tab(s) orally once a day  Eliquis 5 mg oral tablet: 1 tab(s) orally 2 times a day  hydrALAZINE 25 mg oral tablet: 1 tab(s) orally every 12 hours   levothyroxine 50 mcg (0.05 mg) oral tablet: 1 tab(s) orally once a day  MiraLax oral powder for reconstitution: 17 gram(s) orally once a day, As needed, Constipation  predniSONE 10 mg oral tablet: 1 tab(s) orally once a day   predniSONE 10 mg oral tablet: 3 tab(s) orally once a day   predniSONE 20 mg oral tablet: 1 tab(s) orally once a day   predniSONE 20 mg oral tablet: 2 tab(s) orally once a day   Singulair 10 mg oral tablet: 1 tab(s) orally once a day  Spiriva 18 mcg inhalation capsule: 1 cap(s) inhaled once a day  traMADol 50 mg oral tablet: 1 tab(s) orally 2 times a day, As Needed budesonide-formoterol 160 mcg-4.5 mcg/inh inhalation aerosol: 2 puff(s) inhaled 2 times a day  diazePAM 2 mg oral tablet: 1 tab(s) orally 2 times a day, As Needed  dilTIAZem 360 mg/24 hours oral tablet, extended release: 1 tab(s) orally once a day  Eliquis 5 mg oral tablet: 1 tab(s) orally 2 times a day  hydrALAZINE 25 mg oral tablet: 1 tab(s) orally once a day (at bedtime), As Needed IF BLOOD PRESSURE &gt; 140  hydrALAZINE 25 mg oral tablet: 1 tab(s) orally every 12 hours   levothyroxine 50 mcg (0.05 mg) oral tablet: 1 tab(s) orally once a day  MiraLax oral powder for reconstitution: 17 gram(s) orally once a day, As needed, Constipation  predniSONE 10 mg oral tablet: 1 tab(s) orally once a day   predniSONE 10 mg oral tablet: 3 tab(s) orally once a day   predniSONE 20 mg oral tablet: 1 tab(s) orally once a day   predniSONE 20 mg oral tablet: 2 tab(s) orally once a day   Protonix 40 mg oral delayed release tablet: 1 tab(s) orally once a day   Singulair 10 mg oral tablet: 1 tab(s) orally once a day  Spiriva 18 mcg inhalation capsule: 1 cap(s) inhaled once a day  traMADol 50 mg oral tablet: 1 tab(s) orally 2 times a day, As Needed

## 2020-08-31 ENCOUNTER — APPOINTMENT (OUTPATIENT)
Dept: CARDIOLOGY | Facility: CLINIC | Age: 65
End: 2020-08-31
Payer: MEDICARE

## 2020-08-31 VITALS
HEIGHT: 62 IN | WEIGHT: 146 LBS | DIASTOLIC BLOOD PRESSURE: 80 MMHG | BODY MASS INDEX: 26.87 KG/M2 | HEART RATE: 97 BPM | SYSTOLIC BLOOD PRESSURE: 160 MMHG | OXYGEN SATURATION: 97 %

## 2020-08-31 PROBLEM — I10 ESSENTIAL (PRIMARY) HYPERTENSION: Chronic | Status: ACTIVE | Noted: 2020-08-28

## 2020-08-31 PROBLEM — I48.91 UNSPECIFIED ATRIAL FIBRILLATION: Chronic | Status: ACTIVE | Noted: 2020-08-28

## 2020-08-31 PROCEDURE — 99214 OFFICE O/P EST MOD 30 MIN: CPT

## 2020-08-31 NOTE — DISCUSSION/SUMMARY
[FreeTextEntry1] : The patient will restart metoprolol 25 mg once a day, and restart valsartan 160 mg once a day. She'll stay off of the hydrochlorothiazide at this time. I will speak with the nephrologist, Dr. Redding. Further recommendation will be made pending the above. Went over with the patient in detail the medicine she should be taking. We went over a hospital chart and I answered all of her questions. I would see her within the next 2 weeks.

## 2020-08-31 NOTE — REVIEW OF SYSTEMS
[Headache] : headache [Recent Weight Loss (___ Lbs)] : recent [unfilled] ~Ulb weight loss [Chest Pain] : chest pain [Dyspnea on exertion] : dyspnea during exertion [Joint Pain] : joint pain [Palpitations] : palpitations [Knee Pain] : knee pain [Hand Pain] : hand pain [Dizziness] : dizziness [Depression] : depression [Anxiety] : anxiety [Easy Bruising] : a tendency for easy bruising [Negative] : Genitourinary [Fever] : no fever [Chills] : no chills [Feeling Fatigued] : not feeling fatigued [Blurry Vision] : no blurred vision [Seeing Double (Diplopia)] : no diplopia [Skin: A Rash] : no rash: [Excessive Thirst] : no polydipsia [Easy Bleeding] : no tendency for easy bleeding

## 2020-08-31 NOTE — HISTORY OF PRESENT ILLNESS
[FreeTextEntry1] : I saw Hue Guzman in the office today for cardiac follow up. She is a 65-year-old female who has been treated for asthma. She developed hypertension 6 months ago and has been on medication and doing fairly well. She was recently admitted to [facility] 4/20 with symptoms of palpitation, dizziness, chest pain, and headache. She was found to be in rapid atrial fibrillation and converted to sinus rhythm with IV of diltiazem. She was sent home on diltiazem 120 mg once a day. She was given aspirin. She is a CHADSvas 2 was not given anticoagulation because it was felt that her atrial fibrillation was less than 12 hours in duration.\par \par She called me in the office that she was having more rapid heartbeats and we increased her diltiazem 360 mg once a day. Since that time the heart has been better but she has been getting episodes of dizziness, headache, and chest discomfort. The episodes are better than on admission to the hospital but she is still having them daily. Her symptoms seem to be worse at night. When she checks her blood pressure at these times her blood pressure is high. \par \par Patient had a chemical nuclear stress test 5/20 that was normal. EF 66%. Echo showed an ejection fraction of 60-65%. There was calcification of mitral annulus with minimal MR. There was mild AI and TR. There was moderate left atrial enlargement. LVH with stage I diastolic dysfunction.\par \par As above, she has a history of hypertension, hyperlipidemia, and asthma. She denies any history of diabetes she has a brother had a myocardial infarction at age 40.\par \par The patient is having surges in heart rate and blood pressure that occur only at night. It seems to occur more often if she eats something. Blood pressure during the day is better but still elevated.The patient saw Dr Guzman, the endocrinologist who  completed a BP w/u, all negative.\par \par 24-hour blood pressure monitor was normal with an average reading of 113/65. 14 day patch to showed self-limited SVT up to 12 seconds.\par \par The patient had blood work by the nephrologist her sodium was low and she was sent to the hospital last Friday. Sodium was 122. Her hydrochlorothiazide and valsartan were stopped as well some metoprolol. He was given IV normal saline and her blood pressure was okay. At home she is not a heart rate beating fast, very shaky, and blood pressure elevated. Her sodium in May was 138. She also stopped the sertraline for anxiety which she had a bad reaction to.\par \par

## 2020-08-31 NOTE — PHYSICAL EXAM
[General Appearance - Well Developed] : well developed [Normal Appearance] : normal appearance [Well Groomed] : well groomed [General Appearance - Well Nourished] : well nourished [No Deformities] : no deformities [General Appearance - In No Acute Distress] : no acute distress [Normal Conjunctiva] : the conjunctiva exhibited no abnormalities [Normal Oral Mucosa] : normal oral mucosa [Normal Jugular Venous A Waves Present] : normal jugular venous A waves present [Normal Jugular Venous V Waves Present] : normal jugular venous V waves present [No Jugular Venous El A Waves] : no jugular venous el A waves [Respiration, Rhythm And Depth] : normal respiratory rhythm and effort [Auscultation Breath Sounds / Voice Sounds] : lungs were clear to auscultation bilaterally [Exaggerated Use Of Accessory Muscles For Inspiration] : no accessory muscle use [Bowel Sounds] : normal bowel sounds [Abdomen Soft] : soft [Abdomen Tenderness] : non-tender [Abnormal Walk] : normal gait [Gait - Sufficient For Exercise Testing] : the gait was sufficient for exercise testing [Cyanosis, Localized] : no localized cyanosis [Nail Clubbing] : no clubbing of the fingernails [Skin Turgor] : normal skin turgor [Skin Color & Pigmentation] : normal skin color and pigmentation [Oriented To Time, Place, And Person] : oriented to person, place, and time [] : no rash [No Anxiety] : not feeling anxious [Impaired Insight] : insight and judgment were intact [Not Palpable] : not palpable [Normal Rate] : normal [Normal S1] : normal S1 [Normal S2] : normal S2 [No Murmur] : no murmurs heard [2+] : left 2+ [1+] : left 1+ [No Abnormalities] : the abdominal aorta was not enlarged and no bruit was heard [No Pitting Edema] : no pitting edema present [FreeTextEntry1] : Minimal expiratory wheeze [S3] : no S3 [S4] : no S4 [Right Carotid Bruit] : no bruit heard over the right carotid [Right Femoral Bruit] : no bruit heard over the right femoral artery [Left Carotid Bruit] : no bruit heard over the left carotid [Left Femoral Bruit] : no bruit heard over the left femoral artery

## 2020-09-14 ENCOUNTER — RX RENEWAL (OUTPATIENT)
Age: 65
End: 2020-09-14

## 2020-09-24 ENCOUNTER — APPOINTMENT (OUTPATIENT)
Dept: CARDIOLOGY | Facility: CLINIC | Age: 65
End: 2020-09-24
Payer: MEDICARE

## 2020-09-24 VITALS
WEIGHT: 150 LBS | SYSTOLIC BLOOD PRESSURE: 150 MMHG | BODY MASS INDEX: 27.6 KG/M2 | DIASTOLIC BLOOD PRESSURE: 80 MMHG | HEIGHT: 62 IN | HEART RATE: 84 BPM | OXYGEN SATURATION: 96 %

## 2020-09-24 DIAGNOSIS — R60.0 LOCALIZED EDEMA: ICD-10-CM

## 2020-09-24 PROCEDURE — 99214 OFFICE O/P EST MOD 30 MIN: CPT

## 2020-09-24 NOTE — HISTORY OF PRESENT ILLNESS
[FreeTextEntry1] : I saw Hue Guzman in the office today for cardiac follow up. She is a 65-year-old female who has been treated for asthma. She developed hypertension 6 months ago and has been on medication and doing fairly well. She was recently admitted to [facility] 4/20 with symptoms of palpitation, dizziness, chest pain, and headache. She was found to be in rapid atrial fibrillation and converted to sinus rhythm with IV of diltiazem. She was sent home on diltiazem 120 mg once a day. She was given aspirin. She is a CHADSvas 2 was not given anticoagulation because it was felt that her atrial fibrillation was less than 12 hours in duration.\par \par She called me in the office that she was having more rapid heartbeats and we increased her diltiazem 360 mg once a day. Since that time the heart has been better but she has been getting episodes of dizziness, headache, and chest discomfort. The episodes are better than on admission to the hospital but she is still having them daily. Her symptoms seem to be worse at night. When she checks her blood pressure at these times her blood pressure is high. \par \par Patient had a chemical nuclear stress test 5/20 that was normal. EF 66%. Echo showed an ejection fraction of 60-65%. There was calcification of mitral annulus with minimal MR. There was mild AI and TR. There was moderate left atrial enlargement. LVH with stage I diastolic dysfunction.\par \par As above, she has a history of hypertension, hyperlipidemia, and asthma. She denies any history of diabetes she has a brother had a myocardial infarction at age 40.\par \par The patient is having surges in heart rate and blood pressure that occur only at night. It seems to occur more often if she eats something. Blood pressure during the day is better but still elevated.The patient saw Dr Guzman, the endocrinologist who  completed a BP w/u, all negative.\par \par 24-hour blood pressure monitor was normal with an average reading of 113/65. 14 day patch to showed self-limited SVT up to 12 seconds.\par \par The patient had blood work by the nephrologist her sodium was low and she was sent to the hospital 8/20. Sodium was 122. Her hydrochlorothiazide and valsartan were stopped as well metoprolol. She was given IV normal saline and her blood pressure was okay. At home she has noted her heart rate beating fast, very shaky, and blood pressure elevated. Her sodium in May was 138. She also stopped the sertraline for anxiety which she had a bad reaction to.\par \par Over the past several weeks the patient has noted increasing bilateral ankle and leg edema. She is now taking spironolactone 25 mg once a day, and she increased her valsartan to 360,  and reduced her diltiazem to 240. She still be getting high blood pressure and heart rate readings at night.\par \par

## 2020-09-24 NOTE — DISCUSSION/SUMMARY
[FreeTextEntry1] : The patient has 1-2+ ankle and calf edema. She has good pulses bilaterally without any tenderness to the touch. Going to need a stronger diuretic I will speak with , nephrologist to see what she would recommend in view of the fact that the patient has a tendency to develop hyponatremia. The patient is also taking another medicine at home and she will tell me the name. I would like to start back on torsemide but again I will discuss this with the nephrologist.\par \par I spoke with the patient in detail. She is to be careful with her intake of fluid and salt.\par \par ADDENDUM: Bulk with Dr. Redding. Titrate spironolactone to treat edema. We'll check blood work.

## 2020-09-24 NOTE — PHYSICAL EXAM
[General Appearance - Well Developed] : well developed [Normal Appearance] : normal appearance [Well Groomed] : well groomed [General Appearance - Well Nourished] : well nourished [No Deformities] : no deformities [General Appearance - In No Acute Distress] : no acute distress [Normal Conjunctiva] : the conjunctiva exhibited no abnormalities [Normal Oral Mucosa] : normal oral mucosa [Normal Jugular Venous A Waves Present] : normal jugular venous A waves present [Normal Jugular Venous V Waves Present] : normal jugular venous V waves present [No Jugular Venous El A Waves] : no jugular venous el A waves [Respiration, Rhythm And Depth] : normal respiratory rhythm and effort [Exaggerated Use Of Accessory Muscles For Inspiration] : no accessory muscle use [Auscultation Breath Sounds / Voice Sounds] : lungs were clear to auscultation bilaterally [Bowel Sounds] : normal bowel sounds [Abdomen Soft] : soft [Abdomen Tenderness] : non-tender [Abnormal Walk] : normal gait [Gait - Sufficient For Exercise Testing] : the gait was sufficient for exercise testing [Nail Clubbing] : no clubbing of the fingernails [Cyanosis, Localized] : no localized cyanosis [Skin Color & Pigmentation] : normal skin color and pigmentation [Skin Turgor] : normal skin turgor [] : no rash [Oriented To Time, Place, And Person] : oriented to person, place, and time [Impaired Insight] : insight and judgment were intact [No Anxiety] : not feeling anxious [Not Palpable] : not palpable [Normal Rate] : normal [Normal S1] : normal S1 [Normal S2] : normal S2 [No Murmur] : no murmurs heard [2+] : left 2+ [1+] : left 1+ [No Abnormalities] : the abdominal aorta was not enlarged and no bruit was heard [No Pitting Edema] : no pitting edema present [FreeTextEntry1] : Minimal expiratory wheeze [S3] : no S3 [S4] : no S4 [Right Carotid Bruit] : no bruit heard over the right carotid [Left Carotid Bruit] : no bruit heard over the left carotid [Right Femoral Bruit] : no bruit heard over the right femoral artery [Left Femoral Bruit] : no bruit heard over the left femoral artery

## 2020-09-24 NOTE — REVIEW OF SYSTEMS
[Headache] : headache [Recent Weight Loss (___ Lbs)] : recent [unfilled] ~Ulb weight loss [Dyspnea on exertion] : dyspnea during exertion [Chest Pain] : chest pain [Palpitations] : palpitations [Joint Pain] : joint pain [Hand Pain] : hand pain [Knee Pain] : knee pain [Dizziness] : dizziness [Depression] : depression [Anxiety] : anxiety [Easy Bruising] : a tendency for easy bruising [Negative] : Genitourinary [Fever] : no fever [Chills] : no chills [Feeling Fatigued] : not feeling fatigued [Blurry Vision] : no blurred vision [Seeing Double (Diplopia)] : no diplopia [Skin: A Rash] : no rash: [Excessive Thirst] : no polydipsia [Easy Bleeding] : no tendency for easy bleeding

## 2020-09-25 ENCOUNTER — LABORATORY RESULT (OUTPATIENT)
Age: 65
End: 2020-09-25

## 2020-09-27 LAB
ALBUMIN SERPL ELPH-MCNC: 4.4 G/DL
ALP BLD-CCNC: 81 U/L
ALT SERPL-CCNC: 23 U/L
ANION GAP SERPL CALC-SCNC: 15 MMOL/L
AST SERPL-CCNC: 29 U/L
BASOPHILS # BLD AUTO: 0.03 K/UL
BASOPHILS NFR BLD AUTO: 0.3 %
BILIRUB SERPL-MCNC: 0.2 MG/DL
BUN SERPL-MCNC: 6 MG/DL
CALCIUM SERPL-MCNC: 9.7 MG/DL
CHLORIDE SERPL-SCNC: 100 MMOL/L
CO2 SERPL-SCNC: 25 MMOL/L
CREAT SERPL-MCNC: 0.82 MG/DL
EOSINOPHIL # BLD AUTO: 0.05 K/UL
EOSINOPHIL NFR BLD AUTO: 0.6 %
GLUCOSE SERPL-MCNC: 97 MG/DL
HCT VFR BLD CALC: 37.3 %
HGB BLD-MCNC: 12.1 G/DL
IMM GRANULOCYTES NFR BLD AUTO: 0.3 %
LYMPHOCYTES # BLD AUTO: 2.82 K/UL
LYMPHOCYTES NFR BLD AUTO: 32.2 %
MAN DIFF?: NORMAL
MCHC RBC-ENTMCNC: 29.5 PG
MCHC RBC-ENTMCNC: 32.4 GM/DL
MCV RBC AUTO: 91 FL
MONOCYTES # BLD AUTO: 0.58 K/UL
MONOCYTES NFR BLD AUTO: 6.6 %
NEUTROPHILS # BLD AUTO: 5.26 K/UL
NEUTROPHILS NFR BLD AUTO: 60 %
PLATELET # BLD AUTO: 552 K/UL
POTASSIUM SERPL-SCNC: 4.5 MMOL/L
PROT SERPL-MCNC: 6.7 G/DL
RBC # BLD: 4.1 M/UL
RBC # FLD: 14.6 %
SODIUM SERPL-SCNC: 140 MMOL/L
T3RU NFR SERPL: 1.1 TBI
T4 SERPL-MCNC: 8.1 UG/DL
TSH SERPL-ACNC: 0.88 UIU/ML
WBC # FLD AUTO: 8.77 K/UL

## 2020-10-28 NOTE — ED PROVIDER NOTE - GASTROINTESTINAL, MLM
34 yo female [id number] 11 weeks pregnant by US (had one less than 12 hours ago here) presents with "contractions" beginning around midnight and worsening and more frequent. Pt states mild vaginal bleeding initially which resolved. Pt denies fevers/chills, ha, loc, focal neuro deficits, cp/sob/palp, cough, n/v/d, urinary symptoms, recent travel and sick contacts.  Const: Awake, alert and oriented. In no acute distress. Well appearing.  HEENT: NC/AT. Moist mucous membranes.  Eyes: No scleral icterus. EOMI.  Neck:. Soft and supple. Full ROM without pain.  Cardiac: Regular rate and regular rhythm. +S1/S2. Peripheral pulses 2+ and symmetric. No LE edema.  Resp: Speaking in full sentences. No evidence of respiratory distress. No wheezes, rales or rhonchi.  Abd: Soft, non-tender, non-distended. Normal bowel sounds in all 4 quadrants. No guarding or rebound.  Back: Spine midline and non-tender. No CVAT.  Skin: No rashes, abrasions or lacerations.  Lymph: No cervical lymphadenopathy.  Neuro: Awake, alert & oriented x 3. Moves all extremities symmetrically.    evaluated by OB, cervix closed, stable for dc with follow up  UTI - abx started Abdomen soft, non-tender, no guarding. Statement Selected

## 2020-11-27 NOTE — ED PROVIDER NOTE - EYES, MLM
Subjective


Allergies:  


Coded Allergies:  


     PENICILLINS (Verified  Allergy, Unknown, 11/30/17)


   hives


   11/30/17: ITCHING WITH ZOSYN


     PIPERACILLIN (Verified  Allergy, Unknown, 11/27/20)


     TAZOBACTAM (Verified  Allergy, Unknown, 11/27/20)


Subjective


no sob


cards/renal noted





Objective





Last 24 Hour Vital Signs








  Date Time  Temp Pulse Resp B/P (MAP) Pulse Ox O2 Delivery O2 Flow Rate FiO2


 


11/27/20 08:00 97.0 71 20 143/61 (88) 96   


 


11/27/20 04:00  64      


 


11/27/20 04:00 97.7 64 19 135/61 (85) 99   


 


11/27/20 00:00 97.9 71 20 136/75 (95) 97   


 


11/27/20 00:00  72      


 


11/26/20 21:45  74  142/64    


 


11/26/20 21:00      Room Air  


 


11/26/20 20:00 98.1 74 18 142/64 (90) 100   


 


11/26/20 16:00 98.4 74 18 131/61 (84) 97   


 


11/26/20 16:00  76      


 


11/26/20 14:00  72  114/60 (78)    


 


11/26/20 14:00    114/60    


 


11/26/20 12:00  78      


 


11/26/20 11:55 98.1 78 18 94/44 (61) 98   

















Intake and Output  


 


 11/26/20 11/27/20





 19:00 07:00


 


Intake Total 240 ml 300 ml


 


Output Total 2000 ml 


 


Balance -1760 ml 300 ml


 


  


 


Intake Oral 240 ml 300 ml


 


Output Hemodialysis UF 2000 ml 


 


# Voids 2 2








Height (Feet):  4


Height (Inches):  9.00


Weight (Pounds):  116


Objective


WDWN


NAD


clear breath sounds bilaterally without rhonchi or wheeze


[id number] without MRG


NABS nontender no HSM


no CCE


nonfocal





Assessment/Plan


Assessment/Plan:


IMPRESSION


hypertension out of control


CRF


elevated BNP


abnormal CXR


consider pneumonia vs fluid overload


diabetes


hypertension


pruritis





PLAN


repeat CXR noted


home meds


HD with UF


oxygen 


monitor imaging


dc today with home meds


impression, plan, and exam edited and reviewed in detail


care discussed with Hayden Echevarria MD           Nov 27, 2020 09:39
Subjective


Allergies:  


Coded Allergies:  


     PENICILLINS (Verified  Allergy, Unknown, 11/30/17)


   hives


   11/30/17: ITCHING WITH ZOSYN


Subjective


no sob


cards/renal noted





Objective





Last 24 Hour Vital Signs








  Date Time  Temp Pulse Resp B/P (MAP) Pulse Ox O2 Delivery O2 Flow Rate FiO2


 


11/24/20 06:25    124/65    


 


11/24/20 04:00 97.9 70 17 124/65 (84) 100   


 


11/24/20 04:00  67      


 


11/24/20 00:36    153/87    


 


11/24/20 00:36  78  153/87    


 


11/24/20 00:00  79      


 


11/23/20 23:56 96.3 78 17 153/87 (109) 100   


 


11/23/20 21:00      Room Air  


 


11/23/20 20:00  87      


 


11/23/20 20:00 97.5 85 16 170/97 (121) 99   


 


11/23/20 19:05    175/70    


 


11/23/20 18:25    175/70    


 


11/23/20 17:09      Room Air  


 


11/23/20 16:00  95      


 


11/23/20 15:00 98.4 96 20 181/99 98 Room Air  


 


11/23/20 13:00 98.3 89 20 186/91 99 Room Air  


 


11/23/20 11:00 97.8 96 20 181/104 98 Room Air  


 


11/23/20 11:00  88 20   Room Air  


 


11/23/20 10:43 97.3 91 20 189/102 (131) 95 Room Air  

















Intake and Output  


 


 11/23/20 11/24/20





 19:00 07:00


 


Intake Total 120 ml 220 ml


 


Output Total  2000 ml


 


Balance 120 ml -1780 ml


 


  


 


Intake Oral 120 ml 220 ml


 


Output Hemodialysis UF  2000 ml








Laboratory Tests


11/23/20 11:35: 


White Blood Count 7.0, Red Blood Count 5.22, Hemoglobin 14.5, Hematocrit 50.5H, 

Mean Corpuscular Volume 97, Mean Corpuscular Hemoglobin 27.8, Mean Corpuscular 

Hemoglobin Concent 28.7L, Red Cell Distribution Width 18.9H, Platelet Count 110L

, Mean Platelet Volume 9.3, Neutrophils (%) (Auto) 81.3H, Lymphocytes (%) (Auto)

7.9L, Monocytes (%) (Auto) 8.6, Eosinophils (%) (Auto) 0.2, Basophils (%) (Auto)

2.0, Sodium Level 140, Potassium Level 6.7*H, Chloride Level 102, Carbon Dioxide

Level 21, Anion Gap 16H, Blood Urea Nitrogen 38H, Creatinine 7.3H, Estimat 

Glomerular Filtration Rate 6.0, Glucose Level 139H, Calcium Level 9.5, Total 

Bilirubin 1.8H, Direct Bilirubin 0.3, Aspartate Amino Transf (AST/SGOT) 36, 

Alanine Aminotransferase (ALT/SGPT) 14, Alkaline Phosphatase 174H, Troponin I 

0.039, Pro-B-Type Natriuretic Peptide > 43003Z, Total Protein 7.8, Albumin 3.9, 

Globulin 3.9


11/24/20 05:53: Hepatitis B Surface Antigen [Pending]


Height (Feet):  4


Height (Inches):  9.00


Weight (Pounds):  116


Objective


WDWN


NAD


clear breath sounds bilaterally without rhonchi or wheeze


[id number] without MRG


NABS nontender no HSM


no CCE


nonfocal





Assessment/Plan


Assessment/Plan:


IMPRESSION


hypertension out of control


CRF


elevated BNP


abnormal CXR


consider pneumonia vs fluid overload


diabetes


hypertension





PLAN


home meds


HD with UF


oxygen 


monitor imaging


cards and renal to optimize


bp support


close monitoring and stabilization prior to dc


impression, plan, and exam edited and reviewed in detail


care discussed with Hayden Echevarria MD           Nov 24, 2020 08:35
Subjective


Allergies:  


Coded Allergies:  


     PENICILLINS (Verified  Allergy, Unknown, 11/30/17)


   hives


   11/30/17: ITCHING WITH ZOSYN


Subjective


no sob


cards/renal noted





Objective





Last 24 Hour Vital Signs








  Date Time  Temp Pulse Resp B/P (MAP) Pulse Ox O2 Delivery O2 Flow Rate FiO2


 


11/25/20 12:15  80      


 


11/25/20 12:10 97.7 68 18 131/62 (85) 98   


 


11/25/20 09:06      Room Air  


 


11/25/20 08:33  81      


 


11/25/20 08:00 97.7 81 16 122/55 (77) 98   


 


11/25/20 05:57    132/61    


 


11/25/20 04:00 97.6 80 18 160/72 (101) 95   


 


11/25/20 03:32  79      


 


11/25/20 00:00 98.3 79 18 111/57 (75) 96   


 


11/24/20 23:47  77      


 


11/24/20 22:56    138/73    


 


11/24/20 21:27  79  159/63    


 


11/24/20 21:00      Room Air  


 


11/24/20 20:19  81      


 


11/24/20 20:00 97.9 81 20 151/64 (93) 94   


 


11/24/20 18:01  76      


 


11/24/20 16:00  78      


 


11/24/20 16:00 98.1 77 20 134/59 (84) 96   


 


11/24/20 13:49    126/61    

















Intake and Output  


 


 11/24/20 11/25/20





 19:00 07:00


 


Intake Total 340 ml 440 ml


 


Balance 340 ml 440 ml


 


  


 


Intake Oral 340 ml 440 ml


 


# Voids  1








Laboratory Tests


11/25/20 06:28: 


White Blood Count 7.3#, Red Blood Count 4.95, Hemoglobin 13.9, Hematocrit 46.2, 

Mean Corpuscular Volume 93, Mean Corpuscular Hemoglobin 28.2, Mean Corpuscular 

Hemoglobin Concent 30.2L, Red Cell Distribution Width 18.4H, Platelet Count 143L

, Mean Platelet Volume 8.8, Neutrophils (%) (Auto) 83.6H, Lymphocytes (%) (Auto)

4.7L, Monocytes (%) (Auto) 7.9, Eosinophils (%) (Auto) 1.8, Basophils (%) (Auto)

2.0, Sodium Level 137, Potassium Level 3.0L, Chloride Level 97L, Carbon Dioxide 

Level 30, Anion Gap 10, Blood Urea Nitrogen 28H, Creatinine 6.7#H, Estimat 

Glomerular Filtration Rate 6.7, Glucose Level 96, Hemoglobin A1c 5.7, Calcium 

Level 8.8, Phosphorus Level 2.4L, Magnesium Level 2.7H, Total Bilirubin 1.0, 

Gamma Glutamyl Transpeptidase 40, Aspartate Amino Transf (AST/SGOT) 33, Alanine 

Aminotransferase (ALT/SGPT) 17, Alkaline Phosphatase 170H, C-Reactive Protein, 

Quantitative 2.9H, Pro-B-Type Natriuretic Peptide > 82390S, Total Protein 7.4, 

Albumin 3.7, Globulin 3.7, Albumin/Globulin Ratio 1.0, Triglycerides Level 157H,

Cholesterol Level 189, LDL Cholesterol 109H, HDL Cholesterol 40, Cholesterol/HDL

Ratio 4.7H, Thyroid Stimulating Hormone (TSH) 3.396, Free Thyroxine 1.44, Free 

Triiodothyronine 1.6L


Height (Feet):  4


Height (Inches):  9.00


Weight (Pounds):  116


Objective


WDWN


NAD


clear breath sounds bilaterally without rhonchi or wheeze


[id number] without MRG


NABS nontender no HSM


no CCE


nonfocal





Assessment/Plan


Assessment/Plan:


IMPRESSION


hypertension out of control


CRF


elevated BNP


abnormal CXR


consider pneumonia vs fluid overload


diabetes


hypertension


pruritis





PLAN


repeat CXR


home meds


HD with UF


oxygen 


monitor imaging


cards and renal to optimize


bp support


close monitoring and stabilization prior to dc


impression, plan, and exam edited and reviewed in detail


care discussed with Hayden Echevarria MD           Nov 25, 2020 13:03
Subjective


Allergies:  


Coded Allergies:  


     PENICILLINS (Verified  Allergy, Unknown, 11/30/17)


   hives


   11/30/17: ITCHING WITH ZOSYN


Subjective


no sob


cards/renal noted





Objective





Last 24 Hour Vital Signs








  Date Time  Temp Pulse Resp B/P (MAP) Pulse Ox O2 Delivery O2 Flow Rate FiO2


 


11/26/20 04:59 98.6 77 20 143/71 (95) 96   


 


11/26/20 03:35  78      


 


11/26/20 00:00 98.1 81 20 145/70 (95) 95   


 


11/25/20 23:32  80      


 


11/25/20 21:00    154/67    


 


11/25/20 21:00  94  154/67    


 


11/25/20 21:00      Room Air  


 


11/25/20 20:00  81      


 


11/25/20 20:00 98.2 94 18 154/67 (96) 96   


 


11/25/20 16:00 97.9 79 16 141/61 (87) 96   


 


11/25/20 16:00  77      


 


11/25/20 13:29    138/62    


 


11/25/20 12:15  80      


 


11/25/20 12:10 97.7 68 18 131/62 (85) 98   


 


11/25/20 09:06      Room Air  

















Intake and Output  


 


 11/25/20 11/26/20





 18:59 06:59


 


Intake Total 390 ml 150 ml


 


Balance 390 ml 150 ml


 


  


 


Intake Oral 390 ml 150 ml


 


# Voids 1 2


 


# Bowel Movements  2








Laboratory Tests


11/26/20 06:24: 


White Blood Count 6.6, Red Blood Count 4.38, Hemoglobin 12.2, Hematocrit 37.8, 

Mean Corpuscular Volume 86, Mean Corpuscular Hemoglobin 27.8, Mean Corpuscular 

Hemoglobin Concent 32.2, Red Cell Distribution Width 19.4H, Platelet Count 148L,

Mean Platelet Volume 8.6, Neutrophils (%) (Auto) 83.1H, Lymphocytes (%) (Auto) 

8.2L, Monocytes (%) (Auto) 7.1, Eosinophils (%) (Auto) 0.6, Basophils (%) (Auto)

1.0, Sodium Level 137, Potassium Level 3.6, Chloride Level 98, Carbon Dioxide 

Level 32, Anion Gap 8, Blood Urea Nitrogen 37H, Creatinine 8.5H, Estimat 

Glomerular Filtration Rate 5.0, Glucose Level 102, Calcium Level 8.6, Phosphorus

Level 2.6, Magnesium Level 3.0H, Total Bilirubin 0.8, Gamma Glutamyl 

Transpeptidase 37, Aspartate Amino Transf (AST/SGOT) 20, Alanine 

Aminotransferase (ALT/SGPT) 16, Alkaline Phosphatase 145H, Lactate Dehydrogenase

230, C-Reactive Protein, Quantitative 2.1H, Pro-B-Type Natriuretic Peptide > 35

000H, Total Protein 6.9, Albumin 3.4, Globulin 3.5, Albumin/Globulin Ratio 1.0


Height (Feet):  4


Height (Inches):  9.00


Weight (Pounds):  116


Objective


WDWN


NAD


clear breath sounds bilaterally without rhonchi or wheeze


[id number] without MRG


NABS nontender no HSM


no CCE


nonfocal





Assessment/Plan


Assessment/Plan:


IMPRESSION


hypertension out of control


CRF


elevated BNP


abnormal CXR


consider pneumonia vs fluid overload


diabetes


hypertension


pruritis





PLAN


repeat CXR pending


home meds


HD with UF


oxygen 


monitor imaging


cards and renal to optimize


bp support


close monitoring and stabilization prior to dc


d/w renal as to dc planning


impression, plan, and exam edited and reviewed in detail


care discussed with Hayden Echevarria MD           Nov 26, 2020 08:40
Clear bilaterally, pupils equal, round and reactive to light.

## 2020-12-01 ENCOUNTER — APPOINTMENT (OUTPATIENT)
Dept: CARDIOLOGY | Facility: CLINIC | Age: 65
End: 2020-12-01
Payer: MEDICARE

## 2020-12-01 VITALS
HEIGHT: 62 IN | DIASTOLIC BLOOD PRESSURE: 69 MMHG | SYSTOLIC BLOOD PRESSURE: 118 MMHG | WEIGHT: 144 LBS | BODY MASS INDEX: 26.5 KG/M2 | OXYGEN SATURATION: 100 % | HEART RATE: 66 BPM

## 2020-12-01 PROCEDURE — 99214 OFFICE O/P EST MOD 30 MIN: CPT

## 2020-12-01 NOTE — DISCUSSION/SUMMARY
[FreeTextEntry1] : The patient is doing well.  Her weight is stable, and blood pressure is well controlled.  She is having no further major anxiety events.\par \par I has been unfortunately  last week of pancreatic cancer. She is doing okay.\par \par She'll stay on her present medication. If she has any problems she will call me. She is having difficulty financially with the Eliquis we did discuss possibly  putting her on warfarin. Should like to think about this.\par \par If all is well I will see her in 2 months.  At that time we'll repeat her blood work.

## 2020-12-01 NOTE — HISTORY OF PRESENT ILLNESS
[FreeTextEntry1] : I saw Hue Guzman in the office today for cardiac follow up. She is a 65-year-old female who has been treated for asthma. She developed hypertension 6 months ago and has been on medication and doing fairly well. She was recently admitted to [facility] 4/20 with symptoms of palpitation, dizziness, chest pain, and headache. She was found to be in rapid atrial fibrillation and converted to sinus rhythm with IV of diltiazem. She was sent home on diltiazem 120 mg once a day. She was given aspirin. She is a CHADSvas 2 was not given anticoagulation because it was felt that her atrial fibrillation was less than 12 hours in duration.\par \par She called me in the office that she was having more rapid heartbeats and we increased her diltiazem 360 mg once a day. Since that time the heart has been better but she has been getting episodes of dizziness, headache, and chest discomfort. The episodes are better than on admission to the hospital but she is still having them daily. Her symptoms seem to be worse at night. When she checks her blood pressure at these times her blood pressure is high. \par \par Patient had a chemical nuclear stress test 5/20 that was normal. EF 66%. Echo showed an ejection fraction of 60-65%. There was calcification of mitral annulus with minimal MR. There was mild AI and TR. There was moderate left atrial enlargement. LVH with stage I diastolic dysfunction.\par \par As above, she has a history of hypertension, hyperlipidemia, and asthma. She denies any history of diabetes she has a brother had a myocardial infarction at age 40.\par \par The patient is having surges in heart rate and blood pressure that occur only at night. It seems to occur more often if she eats something. Blood pressure during the day is better but still elevated.The patient saw Dr Guzman, the endocrinologist who  completed a BP w/u, all negative.\par \par 24-hour blood pressure monitor was normal with an average reading of 113/65. 14 day patch to showed self-limited SVT up to 12 seconds.\par \par The patient had blood work by the nephrologist her sodium was low and she was sent to the hospital 8/20. Sodium was 122. Her hydrochlorothiazide and valsartan were stopped as well metoprolol. She was given IV normal saline and her blood pressure was okay. At home she has noted her heart rate beating fast, very shaky, and blood pressure elevated. Her sodium in May was 138. \par \par On her present medication the patient is doing well. Blood pressure has been stable and she has very few palpitations. Her anxiety level is improved on sertraline. She has no chest pain or shortness of breath.\par \par \par \par

## 2021-01-25 ENCOUNTER — APPOINTMENT (OUTPATIENT)
Dept: CARDIOLOGY | Facility: CLINIC | Age: 66
End: 2021-01-25
Payer: MEDICARE

## 2021-01-25 VITALS
BODY MASS INDEX: 27.6 KG/M2 | DIASTOLIC BLOOD PRESSURE: 69 MMHG | WEIGHT: 150 LBS | SYSTOLIC BLOOD PRESSURE: 124 MMHG | OXYGEN SATURATION: 97 % | HEIGHT: 62 IN | HEART RATE: 56 BPM

## 2021-01-25 DIAGNOSIS — E07.9 DISORDER OF THYROID, UNSPECIFIED: ICD-10-CM

## 2021-01-25 PROCEDURE — 99214 OFFICE O/P EST MOD 30 MIN: CPT

## 2021-01-25 NOTE — PHYSICAL EXAM
[General Appearance - Well Developed] : well developed [Normal Appearance] : normal appearance [Well Groomed] : well groomed [General Appearance - Well Nourished] : well nourished [No Deformities] : no deformities [General Appearance - In No Acute Distress] : no acute distress [Normal Conjunctiva] : the conjunctiva exhibited no abnormalities [Normal Oral Mucosa] : normal oral mucosa [Normal Jugular Venous A Waves Present] : normal jugular venous A waves present [Normal Jugular Venous V Waves Present] : normal jugular venous V waves present [No Jugular Venous El A Waves] : no jugular venous el A waves [Respiration, Rhythm And Depth] : normal respiratory rhythm and effort [Exaggerated Use Of Accessory Muscles For Inspiration] : no accessory muscle use [Auscultation Breath Sounds / Voice Sounds] : lungs were clear to auscultation bilaterally [Bowel Sounds] : normal bowel sounds [Abdomen Tenderness] : non-tender [Abdomen Soft] : soft [Abnormal Walk] : normal gait [Gait - Sufficient For Exercise Testing] : the gait was sufficient for exercise testing [Nail Clubbing] : no clubbing of the fingernails [Cyanosis, Localized] : no localized cyanosis [Skin Color & Pigmentation] : normal skin color and pigmentation [Skin Turgor] : normal skin turgor [] : no rash [Oriented To Time, Place, And Person] : oriented to person, place, and time [Impaired Insight] : insight and judgment were intact [No Anxiety] : not feeling anxious [Not Palpable] : not palpable [Normal Rate] : normal [Normal S1] : normal S1 [Normal S2] : normal S2 [No Murmur] : no murmurs heard [2+] : left 2+ [1+] : left 1+ [No Abnormalities] : the abdominal aorta was not enlarged and no bruit was heard [No Pitting Edema] : no pitting edema present [FreeTextEntry1] : Minimal expiratory wheeze [S3] : no S3 [S4] : no S4 [Right Carotid Bruit] : no bruit heard over the right carotid [Left Carotid Bruit] : no bruit heard over the left carotid [Right Femoral Bruit] : no bruit heard over the right femoral artery [Left Femoral Bruit] : no bruit heard over the left femoral artery

## 2021-01-25 NOTE — HISTORY OF PRESENT ILLNESS
[FreeTextEntry1] : I saw Hue Guzman in the office today for cardiac follow up. She is a 65-year-old female who has been treated for asthma. She developed hypertension 6 months ago and has been on medication and doing fairly well. She was recently admitted to [facility]  with symptoms of palpitation, dizziness, chest pain, and headache. She was found to be in rapid atrial fibrillation and converted to sinus rhythm with IV of diltiazem. She was sent home on diltiazem 120 mg once a day. She was given aspirin. She is a CHADSvas 2 was not given anticoagulation because it was felt that her atrial fibrillation was less than 12 hours in duration.\par \par She called me in the office that she was having more rapid heartbeats and we increased her diltiazem 360 mg once a day. Since that time the heart has been better but she has been getting episodes of dizziness, headache, and chest discomfort. The episodes are better than on admission to the hospital but she is still having them daily. Her symptoms seem to be worse at night. When she checks her blood pressure at these times her blood pressure is high. \par \par Patient had a chemical nuclear stress test  that was normal. EF 66%. Echo showed an ejection fraction of 60-65%. There was calcification of mitral annulus with minimal MR. There was mild AI and TR. There was moderate left atrial enlargement. LVH with stage I diastolic dysfunction.\par \par As above, she has a history of hypertension, hyperlipidemia, and asthma. She denies any history of diabetes she has a brother had a myocardial infarction at age 40.\par \par The patient is having surges in heart rate and blood pressure that occur only at night. It seems to occur more often if she eats something. Blood pressure during the day is better but still elevated.The patient saw Dr Guzman, the endocrinologist who  completed a BP w/u, all negative.\par \par 24-hour blood pressure monitor was normal with an average reading of 113/65. 14 day patch to showed self-limited SVT up to 12 seconds.\par \par The patient had blood work by the nephrologist her sodium was low and she was sent to the hospital . Sodium was 122. Her hydrochlorothiazide and valsartan were stopped as well metoprolol. She was given IV normal saline and her blood pressure was okay. At home she has noted her heart rate beating fast, very shaky, and blood pressure elevated. Her sodium in May was 138. \par \par Lipase and 2 months ago and she is doing well. At that time her anxiety level is under control and her blood pressure however was good 24 hours a day.  Her  apparently  of stomach cancer in November. Over the past 2 weeks she has been anxious again. She feels that she is going to die.As result of blood pressure and heart rate have been high at night starting about 8:00 PM. Her blood pressure medicine remains unchanged..\par \par \par \par

## 2021-01-25 NOTE — DISCUSSION/SUMMARY
[FreeTextEntry1] : I suspect that her symptoms again are related to anxiety. I will  try increasing the sertraline to 100 mg day and see how she does.  She'll come in tomorrow for general blood work. I am hoping that this will take care of her symptoms. She already is on 6 blood pressure medications. She does have bilateral ankle edema and needs to be more careful with her food, especially the salt content of the food.\par \par This was discussed with the patient and I answered questions. I do not feel that further blood pressure medicine would be useful. He may go up on the diuretic if this does not work.Further recommendations pending the above. Hopefully the patient will be careful with her diet and will do better.

## 2021-01-27 LAB
25(OH)D3 SERPL-MCNC: 82.8 NG/ML
ALBUMIN SERPL ELPH-MCNC: 4.3 G/DL
ALP BLD-CCNC: 66 U/L
ALT SERPL-CCNC: 16 U/L
ANION GAP SERPL CALC-SCNC: 15 MMOL/L
AST SERPL-CCNC: 25 U/L
BASOPHILS # BLD AUTO: 0.02 K/UL
BASOPHILS NFR BLD AUTO: 0.2 %
BILIRUB SERPL-MCNC: 0.4 MG/DL
BUN SERPL-MCNC: 11 MG/DL
CALCIUM SERPL-MCNC: 9.7 MG/DL
CHLORIDE SERPL-SCNC: 99 MMOL/L
CHOLEST SERPL-MCNC: 208 MG/DL
CO2 SERPL-SCNC: 23 MMOL/L
CREAT SERPL-MCNC: 1.14 MG/DL
EOSINOPHIL # BLD AUTO: 0.04 K/UL
EOSINOPHIL NFR BLD AUTO: 0.5 %
ESTIMATED AVERAGE GLUCOSE: 120 MG/DL
GLUCOSE SERPL-MCNC: 92 MG/DL
HBA1C MFR BLD HPLC: 5.8 %
HCT VFR BLD CALC: 33.5 %
HDLC SERPL-MCNC: 86 MG/DL
HGB BLD-MCNC: 11.1 G/DL
IMM GRANULOCYTES NFR BLD AUTO: 0.1 %
LDLC SERPL CALC-MCNC: 108 MG/DL
LYMPHOCYTES # BLD AUTO: 2.6 K/UL
LYMPHOCYTES NFR BLD AUTO: 32.4 %
MAN DIFF?: NORMAL
MCHC RBC-ENTMCNC: 31 PG
MCHC RBC-ENTMCNC: 33.1 GM/DL
MCV RBC AUTO: 93.6 FL
MONOCYTES # BLD AUTO: 0.57 K/UL
MONOCYTES NFR BLD AUTO: 7.1 %
NEUTROPHILS # BLD AUTO: 4.78 K/UL
NEUTROPHILS NFR BLD AUTO: 59.7 %
NONHDLC SERPL-MCNC: 122 MG/DL
PLATELET # BLD AUTO: 460 K/UL
POTASSIUM SERPL-SCNC: 5 MMOL/L
PROT SERPL-MCNC: 6.7 G/DL
RBC # BLD: 3.58 M/UL
RBC # FLD: 13.2 %
SODIUM SERPL-SCNC: 137 MMOL/L
T3 SERPL-MCNC: 82 NG/DL
T4 SERPL-MCNC: 8.3 UG/DL
TRIGL SERPL-MCNC: 72 MG/DL
TSH SERPL-ACNC: 1.8 UIU/ML
WBC # FLD AUTO: 8.02 K/UL

## 2021-02-02 LAB — RENIN ACTIVITY, PLASMA: 6.41 NG/ML/HR

## 2021-03-17 LAB
RENIN ACTIVITY, PLASMA: 3.83 NG/ML/HR
RENIN ACTIVITY, PLASMA: 8.29 NG/ML/HR

## 2021-03-18 ENCOUNTER — RX RENEWAL (OUTPATIENT)
Age: 66
End: 2021-03-18

## 2021-03-29 ENCOUNTER — TRANSCRIPTION ENCOUNTER (OUTPATIENT)
Age: 66
End: 2021-03-29

## 2021-04-12 ENCOUNTER — RX RENEWAL (OUTPATIENT)
Age: 66
End: 2021-04-12

## 2021-05-11 ENCOUNTER — RX RENEWAL (OUTPATIENT)
Age: 66
End: 2021-05-11

## 2021-07-09 ENCOUNTER — RX RENEWAL (OUTPATIENT)
Age: 66
End: 2021-07-09

## 2021-10-08 ENCOUNTER — APPOINTMENT (OUTPATIENT)
Dept: CARDIOLOGY | Facility: CLINIC | Age: 66
End: 2021-10-08
Payer: MEDICARE

## 2021-10-08 ENCOUNTER — NON-APPOINTMENT (OUTPATIENT)
Age: 66
End: 2021-10-08

## 2021-10-08 VITALS
HEART RATE: 45 BPM | BODY MASS INDEX: 27.6 KG/M2 | WEIGHT: 150 LBS | SYSTOLIC BLOOD PRESSURE: 112 MMHG | HEIGHT: 62 IN | DIASTOLIC BLOOD PRESSURE: 66 MMHG | OXYGEN SATURATION: 98 %

## 2021-10-08 DIAGNOSIS — E78.00 PURE HYPERCHOLESTEROLEMIA, UNSPECIFIED: ICD-10-CM

## 2021-10-08 PROCEDURE — 93000 ELECTROCARDIOGRAM COMPLETE: CPT

## 2021-10-08 PROCEDURE — 99214 OFFICE O/P EST MOD 30 MIN: CPT

## 2021-10-08 NOTE — DISCUSSION/SUMMARY
[FreeTextEntry1] : The patient's blood pressure and heart rate are low and I am going to cut the diltiazem back from 320 to 180 mg a day.  She will monitor her blood pressure heart rate and call me and let me know how she is doing.  Is lost 20 pounds and notes significant hair loss.  She is uncertain why this is happening.  She will check with you concerning this as well as needed for psychiatrist as she still feels that she has significant fear of dying.\par \par I would like to review blood work from your office.  She may have thyroid abnormality.\par \par If all is well I would see her again in about 3 months.  If she has difficulty with her blood pressure or heart rate or has palpitations she would call me sooner.

## 2021-10-08 NOTE — HISTORY OF PRESENT ILLNESS
[FreeTextEntry1] : I saw Hue Guzman in the office today for cardiac follow up. She is a 66-year-old female who has been treated for asthma. She developed hypertension 6 months ago and has been on medication and doing fairly well. She was recently admitted to [facility]  with symptoms of palpitation, dizziness, chest pain, and headache. She was found to be in rapid atrial fibrillation and converted to sinus rhythm with IV of diltiazem. She was sent home on diltiazem 120 mg once a day. She was given aspirin. She is a CHADSvas 2 was not given anticoagulation because it was felt that her atrial fibrillation was less than 12 hours in duration.\par \par She called me in the office that she was having more rapid heartbeats and we increased her diltiazem 360 mg once a day. Since that time the heart has been better but she has been getting episodes of dizziness, headache, and chest discomfort. The episodes are better than on admission to the hospital but she is still having them daily. Her symptoms seem to be worse at night. When she checks her blood pressure at these times her blood pressure is high. \par \par Patient had a chemical nuclear stress test  that was normal. EF 66%. Echo showed an ejection fraction of 60-65%. There was calcification of mitral annulus with minimal MR. There was mild AI and TR. There was moderate left atrial enlargement. LVH with stage I diastolic dysfunction.\par \par As above, she has a history of hypertension, hyperlipidemia, and asthma. She denies any history of diabetes she has a brother had a myocardial infarction at age 40.\par \par The patient is having surges in heart rate and blood pressure that occur only at night. It seems to occur more often if she eats something. Blood pressure during the day is better but still elevated.The patient saw Dr Guzman, the endocrinologist who  completed a BP w/u, all negative.\par \par 24-hour blood pressure monitor was normal with an average reading of 113/65. 14 day patch to showed self-limited SVT up to 12 seconds.\par \par The patient had blood work by the nephrologist her sodium was low and she was sent to the hospital . Sodium was 122. Her hydrochlorothiazide and valsartan were stopped as well metoprolol. She was given IV normal saline and her blood pressure was okay. At home she has noted her heart rate beating fast, very shaky, and blood pressure elevated. Her sodium in May was 138. \par \par Last visit , her anxiety level was under control and her blood pressure  was good 24 hours a day.  Her  apparently  of stomach cancer in November.  She has been losing weight.  She also notes significant hair loss.\par \par Blood work  demonstrated a hemoglobin of 11.1 with a mechanical height of 33.5.  Cholesterol 208, triglycerides 72, HDL 86, and .  A1c was 5.8.  Because of reduced blood pressure her hydralazine was stopped and metoprolol was decreased to 25 mg once a day.\par \par CG demonstrates sinus bradycardia at 48.  There are no acute changes.\par \par \par \par

## 2021-10-08 NOTE — REVIEW OF SYSTEMS
[Headache] : headache [Palpitations] : palpitations [Joint Pain] : joint pain [Hand Pain] : hand pain [Knee Pain] : knee pain [Dizziness] : dizziness [Depression] : depression [Anxiety] : anxiety [Negative] : Genitourinary [Dyspnea on exertion] : dyspnea during exertion [Rash] : no rash [Easy Bleeding] : no tendency for easy bleeding [Easy Bruising] : no tendency for easy bruising

## 2021-10-08 NOTE — PHYSICAL EXAM
[General Appearance - Well Developed] : well developed [Normal Appearance] : normal appearance [Well Groomed] : well groomed [General Appearance - Well Nourished] : well nourished [No Deformities] : no deformities [General Appearance - In No Acute Distress] : no acute distress [Normal Conjunctiva] : the conjunctiva exhibited no abnormalities [Normal Oral Mucosa] : normal oral mucosa [Normal Jugular Venous V Waves Present] : normal jugular venous V waves present [Normal Jugular Venous A Waves Present] : normal jugular venous A waves present [No Jugular Venous El A Waves] : no jugular venous el A waves [Respiration, Rhythm And Depth] : normal respiratory rhythm and effort [Exaggerated Use Of Accessory Muscles For Inspiration] : no accessory muscle use [Auscultation Breath Sounds / Voice Sounds] : lungs were clear to auscultation bilaterally [Bowel Sounds] : normal bowel sounds [Abdomen Soft] : soft [Abdomen Tenderness] : non-tender [Abnormal Walk] : normal gait [Gait - Sufficient For Exercise Testing] : the gait was sufficient for exercise testing [Nail Clubbing] : no clubbing of the fingernails [Cyanosis, Localized] : no localized cyanosis [Skin Color & Pigmentation] : normal skin color and pigmentation [Skin Turgor] : normal skin turgor [] : no rash [Impaired Insight] : insight and judgment were intact [Oriented To Time, Place, And Person] : oriented to person, place, and time [No Anxiety] : not feeling anxious [Not Palpable] : not palpable [Normal Rate] : normal [Normal S1] : normal S1 [Normal S2] : normal S2 [No Murmur] : no murmurs heard [2+] : left 2+ [1+] : left 1+ [No Abnormalities] : the abdominal aorta was not enlarged and no bruit was heard [No Pitting Edema] : no pitting edema present [FreeTextEntry1] : Minimal expiratory wheeze [S3] : no S3 [S4] : no S4 [Right Carotid Bruit] : no bruit heard over the right carotid [Left Carotid Bruit] : no bruit heard over the left carotid [Right Femoral Bruit] : no bruit heard over the right femoral artery [Left Femoral Bruit] : no bruit heard over the left femoral artery

## 2022-04-15 NOTE — PROGRESS NOTE ADULT - I WAS PHYSICALLY PRESENT FOR THE KEY PORTIONS OF THE EVALUATION AND MANAGEMENT (E/M) SERVICE PROVIDED.  I AGREE WITH THE ABOVE HISTORY, PHYSICAL, AND PLAN WHICH I HAVE REVIEWED AND EDITED WHERE APPROPRIATE
Statement Selected
PAST MEDICAL HISTORY:  CAD (coronary artery disease)     Homelessness     
Statement Selected

## 2022-05-06 ENCOUNTER — APPOINTMENT (OUTPATIENT)
Dept: ORTHOPEDIC SURGERY | Facility: CLINIC | Age: 67
End: 2022-05-06
Payer: COMMERCIAL

## 2022-05-06 PROCEDURE — 72100 X-RAY EXAM L-S SPINE 2/3 VWS: CPT

## 2022-05-06 PROCEDURE — 99204 OFFICE O/P NEW MOD 45 MIN: CPT

## 2022-05-06 PROCEDURE — 99072 ADDL SUPL MATRL&STAF TM PHE: CPT

## 2022-05-06 NOTE — PHYSICAL EXAM
[Normal Mood and Affect] : normal mood and affect [Orientated] : orientated [Able to Communicate] : able to communicate [Well Developed] : well developed [Well Nourished] : well nourished [NL (30)] : right lateral rotation 30 degrees [Bending to right] : bending to right [Flexion] : flexion [No loosening of hardware] : No loosening of hardware [Fusion intact] : Fusion intact [de-identified] : thin [] : negative sitting straight leg raise [FreeTextEntry3] : Healed midline 8 cm incision [FreeTextEntry1] : Kyphoplasty L2, L3 and L5 for previous compression fractures.\par Pedicle screw fixation and fusion L4-S1. [TWNoteComboBox7] : forward flexion 60 degrees
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
## 2022-06-06 ENCOUNTER — RESULT REVIEW (OUTPATIENT)
Age: 67
End: 2022-06-06

## 2022-06-06 ENCOUNTER — APPOINTMENT (OUTPATIENT)
Dept: ORTHOPEDIC SURGERY | Facility: CLINIC | Age: 67
End: 2022-06-06
Payer: COMMERCIAL

## 2022-06-06 VITALS — BODY MASS INDEX: 23.92 KG/M2 | WEIGHT: 130 LBS | HEIGHT: 62 IN

## 2022-06-06 DIAGNOSIS — E78.00 PURE HYPERCHOLESTEROLEMIA, UNSPECIFIED: ICD-10-CM

## 2022-06-06 DIAGNOSIS — J45.909 UNSPECIFIED ASTHMA, UNCOMPLICATED: ICD-10-CM

## 2022-06-06 DIAGNOSIS — M51.36 OTHER INTERVERTEBRAL DISC DEGENERATION, LUMBAR REGION: ICD-10-CM

## 2022-06-06 DIAGNOSIS — M48.061 SPINAL STENOSIS, LUMBAR REGION WITHOUT NEUROGENIC CLAUDICATION: ICD-10-CM

## 2022-06-06 DIAGNOSIS — I10 ESSENTIAL (PRIMARY) HYPERTENSION: ICD-10-CM

## 2022-06-06 PROCEDURE — 99213 OFFICE O/P EST LOW 20 MIN: CPT

## 2022-06-06 PROCEDURE — 99072 ADDL SUPL MATRL&STAF TM PHE: CPT

## 2022-06-06 NOTE — PHYSICAL EXAM
[Normal Mood and Affect] : normal mood and affect [Orientated] : orientated [Able to Communicate] : able to communicate [Well Developed] : well developed [Well Nourished] : well nourished [NL (30)] : right lateral rotation 30 degrees [Bending to right] : bending to right [Flexion] : flexion [No loosening of hardware] : No loosening of hardware [Fusion intact] : Fusion intact [de-identified] : thin [] : negative sitting straight leg raise [FreeTextEntry3] : Healed midline 8 cm incision [FreeTextEntry1] : Kyphoplasty L2, L3 and L5 for previous compression fractures.\par Pedicle screw fixation and fusion L4-S1. [TWNoteComboBox7] : forward flexion 60 degrees

## 2022-06-06 NOTE — REVIEW OF SYSTEMS
[Negative] : Heme/Lymph [Joint Pain] : joint pain [Anxiety] : anxiety [FreeTextEntry5] : HTN [FreeTextEntry7] : acis reflux [de-identified] : thyroid
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
## 2022-06-07 ENCOUNTER — NON-APPOINTMENT (OUTPATIENT)
Age: 67
End: 2022-06-07

## 2022-06-22 ENCOUNTER — APPOINTMENT (OUTPATIENT)
Dept: PAIN MANAGEMENT | Facility: CLINIC | Age: 67
End: 2022-06-22
Payer: COMMERCIAL

## 2022-06-22 VITALS — BODY MASS INDEX: 23.92 KG/M2 | HEIGHT: 62 IN | WEIGHT: 130 LBS

## 2022-06-22 DIAGNOSIS — M84.48XA PATHOLOGICAL FRACTURE, OTHER SITE, INITIAL ENCOUNTER FOR FRACTURE: ICD-10-CM

## 2022-06-22 PROCEDURE — 99204 OFFICE O/P NEW MOD 45 MIN: CPT

## 2022-06-22 PROCEDURE — 99072 ADDL SUPL MATRL&STAF TM PHE: CPT

## 2022-06-22 RX ORDER — APIXABAN 5 MG/1
5 TABLET, FILM COATED ORAL
Qty: 60 | Refills: 3 | Status: DISCONTINUED | COMMUNITY
End: 2022-06-22

## 2022-06-22 NOTE — ASSESSMENT
[FreeTextEntry1] : After discussing various treatment options with the patient including but not limited to oral medications, physical therapy, exercise, modalities as well as interventional spinal injections, we have decided with the following plan:\par \par 1) Intervention Injection Therapy:\par I personally reviewed the MRI/CT scan images and agree with the radiologist's report. The radiological findings were discussed with the patient.\par The risks, benefits, contents and alternatives to injection were explained in full to the patient. Risks outlined include but are not limited to infection,sepsis, bleeding, post-dural puncture headache, nerve damage, temporary increase in pain, syncopal episode, failure to resolve symptoms, allergic reaction, symptom recurrence, and elevation of blood sugar in diabetics. Cortisone may cause immunosuppression. Patient understands the risks. All questions were answered. After discussion of options, patient requested an injection. Information regarding the injection was given to the patient. Which medications to stop prior to the injection was explained to the patient as well.\par \par Follow up in 1-2 weeks post injection for re-evaluation. \par Continue Home exercises, stretching, activity modification, physical therapy, and conservative care.\par \par Patient is presenting with acute/sub-acute radicular pain with impairment in ADLs and functionality.  The pain has not responded to  conservative care including nsaid therapy and/or physical therapy.  There is no bleeding tendency, unstable medical condition, or systemic infection. \par \par Caudal INDIGO\par \par I did explain pt at high risk of fracture and injection therapy would put her at an even higher risk. Offered her pain medication, however she would like to proceed with the injection.

## 2022-06-22 NOTE — PHYSICAL EXAM
[] : motor exam is non-focal throughout both lower extremities [TWNoteComboBox7] : forward flexion 60 degrees [de-identified] : extension 5 degrees
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
## 2022-07-05 ENCOUNTER — NON-APPOINTMENT (OUTPATIENT)
Age: 67
End: 2022-07-05

## 2022-07-06 LAB — SARS-COV-2 N GENE NPH QL NAA+PROBE: NOT DETECTED

## 2022-07-07 ENCOUNTER — APPOINTMENT (OUTPATIENT)
Age: 67
End: 2022-07-07

## 2022-07-07 PROCEDURE — 62323 NJX INTERLAMINAR LMBR/SAC: CPT

## 2022-07-07 PROCEDURE — J3490M: CUSTOM

## 2022-07-20 ENCOUNTER — APPOINTMENT (OUTPATIENT)
Dept: PAIN MANAGEMENT | Facility: CLINIC | Age: 67
End: 2022-07-20

## 2022-07-20 VITALS — WEIGHT: 131.13 LBS | HEIGHT: 62 IN | BODY MASS INDEX: 24.13 KG/M2

## 2022-07-20 PROCEDURE — 99072 ADDL SUPL MATRL&STAF TM PHE: CPT

## 2022-07-20 PROCEDURE — 99213 OFFICE O/P EST LOW 20 MIN: CPT
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
## 2022-07-20 NOTE — PHYSICAL EXAM
[] : no palpable masses [TWNoteComboBox7] : forward flexion 60 degrees [de-identified] : extension 5 degrees

## 2022-08-31 ENCOUNTER — APPOINTMENT (OUTPATIENT)
Dept: PAIN MANAGEMENT | Facility: CLINIC | Age: 67
End: 2022-08-31

## 2022-08-31 VITALS — WEIGHT: 131 LBS | BODY MASS INDEX: 24.73 KG/M2 | HEIGHT: 61 IN

## 2022-08-31 DIAGNOSIS — M54.14 RADICULOPATHY, THORACIC REGION: ICD-10-CM

## 2022-08-31 PROCEDURE — 99072 ADDL SUPL MATRL&STAF TM PHE: CPT

## 2022-08-31 PROCEDURE — 99214 OFFICE O/P EST MOD 30 MIN: CPT

## 2022-08-31 NOTE — PHYSICAL EXAM
[Normal Coordination] : normal coordination [Normal Mood and Affect] : normal mood and affect [Able to Communicate] : able to communicate [Well Developed] : well developed [de-identified] : pain with deep inspiration [] : thoracic paraspinal tenderness [TWNoteComboBox7] : forward flexion 60 degrees [de-identified] : extension 5 degrees

## 2022-08-31 NOTE — ASSESSMENT
[FreeTextEntry1] : After discussing various treatment options with the patient including but not limited to oral medications, physical therapy, exercise, modalities as well as interventional spinal injections, we have decided with the following plan:\par \par 1) A MRI is indicated as there has been failure of numerous conservative therapies over the last 6-8 weeks. these include but are not limited to medication therapy and physical therapy. \par Thoracic spine\par \par 2) The patient would benefit from continuation of physical therapy. Short and Long Term goals would be improvement of pain level, improvement of range of motion, improvement of strength and overall improvement of quality of life.
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
## 2022-09-01 ENCOUNTER — RESULT REVIEW (OUTPATIENT)
Age: 67
End: 2022-09-01

## 2022-09-19 ENCOUNTER — APPOINTMENT (OUTPATIENT)
Dept: PAIN MANAGEMENT | Facility: CLINIC | Age: 67
End: 2022-09-19

## 2022-09-19 VITALS — HEIGHT: 61 IN | BODY MASS INDEX: 24.73 KG/M2 | WEIGHT: 131 LBS

## 2022-09-19 DIAGNOSIS — S22.000A WEDGE COMPRESSION FRACTURE OF UNSPECIFIED THORACIC VERTEBRA, INITIAL ENCOUNTER FOR CLOSED FRACTURE: ICD-10-CM

## 2022-09-19 DIAGNOSIS — Z98.1 ARTHRODESIS STATUS: ICD-10-CM

## 2022-09-19 PROCEDURE — 99072 ADDL SUPL MATRL&STAF TM PHE: CPT

## 2022-09-19 PROCEDURE — 99214 OFFICE O/P EST MOD 30 MIN: CPT
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
## 2022-09-19 NOTE — ASSESSMENT
[FreeTextEntry1] : After discussing various treatment options with the patient including but not limited to oral medications, physical therapy, exercise, modalities as well as interventional spinal injections, we have decided with the following plan:\par \par 1) hold off on PT, until evidence of healing.\par \par 2) will give LSO, at the TLSO is too bulky and she can not wear it.

## 2022-09-19 NOTE — PHYSICAL EXAM
[Normal Coordination] : normal coordination [Normal Mood and Affect] : normal mood and affect [Able to Communicate] : able to communicate [Well Developed] : well developed [de-identified] : pain with deep inspiration [] : no palpable masses [[id number]] : forward flexion 60 degrees [de-identified] : extension 5 degrees

## 2022-10-11 ENCOUNTER — RX RENEWAL (OUTPATIENT)
Age: 67
End: 2022-10-11

## 2022-10-18 PROBLEM — G95.89 CERVICAL CORD MYELOMALACIA: Status: ACTIVE | Noted: 2018-10-02

## 2022-10-18 PROBLEM — G95.20 CERVICAL SPINAL CORD COMPRESSION: Status: ACTIVE | Noted: 2018-10-02

## 2022-10-19 ENCOUNTER — APPOINTMENT (OUTPATIENT)
Dept: PAIN MANAGEMENT | Facility: CLINIC | Age: 67
End: 2022-10-19

## 2022-10-19 VITALS — BODY MASS INDEX: 24.73 KG/M2 | WEIGHT: 131 LBS | HEIGHT: 61 IN

## 2022-10-19 DIAGNOSIS — G95.89 OTHER SPECIFIED DISEASES OF SPINAL CORD: ICD-10-CM

## 2022-10-19 DIAGNOSIS — M54.16 RADICULOPATHY, LUMBAR REGION: ICD-10-CM

## 2022-10-19 DIAGNOSIS — G95.20 UNSPECIFIED CORD COMPRESSION: ICD-10-CM

## 2022-10-19 PROCEDURE — 99214 OFFICE O/P EST MOD 30 MIN: CPT

## 2022-10-19 PROCEDURE — 99072 ADDL SUPL MATRL&STAF TM PHE: CPT

## 2022-10-19 NOTE — PHYSICAL EXAM
[Normal Coordination] : normal coordination [Normal Mood and Affect] : normal mood and affect [Able to Communicate] : able to communicate [Well Developed] : well developed [de-identified] : pain with deep inspiration [] : no palpable masses [[id number]] : forward flexion 60 degrees [de-identified] : extension 5 degrees
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
## 2022-10-19 NOTE — H&P ADULT - NSHPPHYSICALEXAM_GEN_ALL_CORE
Physical Exam  T(C): --  HR: --  BP: --  RR: --  SpO2: --    Constitutional - NAD, Comfortable  HEENT - NCAT, EOMI  Neck - Supple  Chest - CTA bilaterally  Cardiovascular - RRR, S1S2  Abdomen - BS+, Soft, NTND  Extremities - No C/C/E, No calf tenderness   Neurologic Exam -                    Cognitive - Awake, Alert, Oriented to self, place, date, year, situation     Communication - Fluent, No dysarthria     Attention - able to recite months of the year backward     Naming/Abstract -      Memory - able to recall 3/3 items immediate and -/3 after 3 minutes     Cranial Nerves - CN 2-12 grossly intact     Motor -                     LEFT    UE - ShAB 5/5, EF 5/5, EE 5/5, WE 5/5,  5/5                    RIGHT UE - ShAB 5/5, EF 5/5, EE 5/5, WE 5/5,  5/5                    LEFT    LE - HF 5/5, KE 5/5, DF 5/5, PF 5/5                    RIGHT LE - HF 5/5, KE 5/5, DF 5/5, PF 5/5        Sensory - Intact to light touch diffusely     Reflexes - DTR intact and symmetrical, Negative Mosley's bilaterally, Negative Babinski's bilaterally      Coordination - Finger-to-nose intact bilaterally   Psychiatric - Affect WNL  Skin - Vital Signs Last 24 Hrs  T(C): 38.3 (18 Jan 2019 15:23), Max: 38.3 (18 Jan 2019 15:23)  T(F): 101 (18 Jan 2019 15:23), Max: 101 (18 Jan 2019 15:23)  HR: 89 (18 Jan 2019 15:23) (89 - 89)  BP: 156/84 (18 Jan 2019 15:23) (156/84 - 156/84)  BP(mean): --  RR: 14 (18 Jan 2019 15:23) (14 - 14)  SpO2: 95% (18 Jan 2019 15:23) (95% - 95%)    Constitutional - NAD, Comfortable  HEENT - NCAT, EOMI  Neck - Supple  Chest - CTA bilaterally  Cardiovascular - RRR, S1S2  Abdomen - BS+, Soft, NTND  Extremities - No C/C/E, No calf tenderness   Neurologic Exam -                    Cognitive - Awake, Alert, Oriented to self, place, date, year, situation     Communication - Fluent, No dysarthria     Attention - able to recite months of the year backward     Naming/Abstract -      Memory - able to recall 3/3 items immediate and -/3 after 3 minutes     Cranial Nerves - CN 2-12 grossly intact     Motor -                     LEFT    UE - ShAB 5/5, EF 5/5, EE 5/5, WE 5/5,  5/5                    RIGHT UE - ShAB 5/5, EF 5/5, EE 5/5, WE 5/5,  5/5                    LEFT    LE - HF 5/5, KE 5/5, DF 5/5, PF 5/5                    RIGHT LE - HF 5/5, KE 5/5, DF 5/5, PF 5/5        Sensory - Intact to light touch diffusely     Reflexes - DTR intact and symmetrical, Negative Mosley's bilaterally, Negative Babinski's bilaterally      Coordination - Finger-to-nose intact bilaterally   Psychiatric - Affect WNL  Skin - Vital Signs Last 24 Hrs  T(C): 38.3 (18 Jan 2019 15:23), Max: 38.3 (18 Jan 2019 15:23)  T(F): 101 (18 Jan 2019 15:23), Max: 101 (18 Jan 2019 15:23)  HR: 89 (18 Jan 2019 15:23) (89 - 89)  BP: 156/84 (18 Jan 2019 15:23) (156/84 - 156/84)  BP(mean): --  RR: 14 (18 Jan 2019 15:23) (14 - 14)  SpO2: 95% (18 Jan 2019 15:23) (95% - 95%)    Constitutional - NAD, Comfortable  HEENT - NCAT, EOMI  Neck - surgical incision covered with steri-strips, non-TTP, no surrounding erythema  Chest - CTA bilaterally  Cardiovascular - RRR, S1S2  Abdomen - BS+, Soft, NTND  Extremities - No C/C/E, No calf tenderness   Neurologic Exam -                    Cognitive - Awake, Alert, Oriented to self, place, date, year, situation     Communication - Fluent, No dysarthria     Cranial Nerves - CN 2-12 grossly intact     Motor -                     LEFT    UE - ShAB 4/5, EF 4/5, EE 4/5, WE 4/5,  4/5                    RIGHT UE - ShAB 4/5, EF 4/5, EE 4/5, WE 4/5,  4/5                    LEFT    LE - HF 4/5, KE 5/5, DF 5/5, PF 5/5                    RIGHT LE - HF 4/5, KE 5/5, DF 5/5, PF 5/5        Sensory - Intact to light touch diffusely     Reflexes - DTR intact and symmetrical, Negative Babinski's bilaterally      Coordination - Finger-to-nose intact bilaterally   Psychiatric - Affect WNL  Skin - surgical incision site chest pain, shortness of breath Vital Signs Last 24 Hrs  T(C): 38.3 (18 Jan 2019 15:23), Max: 38.3 (18 Jan 2019 15:23)  T(F): 101 (18 Jan 2019 15:23), Max: 101 (18 Jan 2019 15:23)  HR: 89 (18 Jan 2019 15:23) (89 - 89)  BP: 156/84 (18 Jan 2019 15:23) (156/84 - 156/84)  BP(mean): --  RR: 14 (18 Jan 2019 15:23) (14 - 14)  SpO2: 95% (18 Jan 2019 15:23) (95% - 95%)    Constitutional - NAD, Comfortable  HEENT - NCAT, EOMI  Neck - surgical incision covered with steri-strips, non-TTP, no surrounding erythema, c/d/i  Chest - CTA bilaterally  Cardiovascular - RRR, S1S2  Abdomen - BS+, Soft, NTND  Extremities - No C/C/E, No calf tenderness   Neurologic Exam -                    Cognitive - Awake, Alert, Oriented to self, place, date, year, situation     Communication - Fluent, No dysarthria     Cranial Nerves - CN 2-12 grossly intact     Motor -                     LEFT    UE - ShAB 4/5, EF 4/5, EE 4/5, WE 4/5,  4/5                    RIGHT UE - ShAB 4/5, EF 4/5, EE 4/5, WE 4/5,  4/5                    LEFT    LE - HF 4/5, KE 5/5, DF 5/5, PF 5/5                    RIGHT LE - HF 4/5, KE 5/5, DF 5/5, PF 5/5        Sensory - Intact to light touch diffusely     Reflexes - DTR intact and symmetrical, Negative Babinski's bilaterally      Coordination - Finger-to-nose intact bilaterally   Psychiatric - Affect WNL  Skin - surgical incision site

## 2022-10-19 NOTE — ASSESSMENT
[FreeTextEntry1] : After discussing various treatment options with the patient including but not limited to oral medications, physical therapy, exercise, modalities as well as interventional spinal injections, we have decided with the following plan:\par \par 1) rheum consult for osteoarthritis \par \par 2) The patient would benefit from continuation of physical therapy. Short and Long Term goals would be improvement of pain level, improvement of range of motion, improvement of strength and overall improvement of quality of life.

## 2022-11-28 ENCOUNTER — NON-APPOINTMENT (OUTPATIENT)
Age: 67
End: 2022-11-28

## 2022-11-28 ENCOUNTER — APPOINTMENT (OUTPATIENT)
Dept: CARDIOLOGY | Facility: CLINIC | Age: 67
End: 2022-11-28

## 2022-11-28 VITALS
WEIGHT: 128 LBS | HEART RATE: 82 BPM | DIASTOLIC BLOOD PRESSURE: 70 MMHG | SYSTOLIC BLOOD PRESSURE: 123 MMHG | BODY MASS INDEX: 24.17 KG/M2 | HEIGHT: 61 IN | OXYGEN SATURATION: 96 %

## 2022-11-28 PROCEDURE — 93000 ELECTROCARDIOGRAM COMPLETE: CPT

## 2022-11-28 PROCEDURE — 99214 OFFICE O/P EST MOD 30 MIN: CPT

## 2022-11-28 RX ORDER — METOPROLOL SUCCINATE 25 MG/1
25 TABLET, EXTENDED RELEASE ORAL
Qty: 90 | Refills: 3 | Status: DISCONTINUED | COMMUNITY
Start: 2021-07-09 | End: 2022-11-28

## 2022-11-28 NOTE — REVIEW OF SYSTEMS
[Headache] : headache [Dyspnea on exertion] : dyspnea during exertion [Palpitations] : palpitations [Joint Pain] : joint pain [Hand Pain] : hand pain [Knee Pain] : knee pain [Dizziness] : dizziness [Depression] : depression [Anxiety] : anxiety [Negative] : Genitourinary [Rash] : no rash [Easy Bleeding] : no tendency for easy bleeding [Easy Bruising] : no tendency for easy bruising

## 2022-11-28 NOTE — DISCUSSION/SUMMARY
[FreeTextEntry1] : I reviewed her most recent 24-hour ambulatory blood pressure monitor from August 2020.  This was a normal tracing, with an average daytime blood pressure of 113/65.  I reviewed her most recent Zio patch from May 2020.  She had over 100 runs of SVT, all of them brief, lasting up to approximately 12 seconds.  Some episodes might have been atrial tachycardia with block.  These were symptomatic.  Echocardiography was performed in May 2020.  This revealed an ejection fraction of 60 to 65%, with mild aortic insufficiency, and moderate left atrial enlargement.\par \par She has been adjusting her blood pressure medications on her own.  I think that this is counterproductive, and potentially even a little dangerous.  Spironolactone has caused hyponatremia.  I will cut that in half.  She will limit her propranolol to 10 mg 3 times daily.  She will not take additional propranolol, noting slow resting heart rates.  She will use hydralazine for spikes in her blood pressure over 150 mmHg, and I have explained that she does not necessarily need any of this for blood pressures that are in the 130s.  I think her emotional state plays a significant role.\par \par Given a change in her insurance, diltiazem 360 mg pills are not covered, and we will change to 180 mg, to be taken twice daily.\par \par She will see me in 2 months to make sure that these adjustments are appropriate.

## 2022-11-28 NOTE — HISTORY OF PRESENT ILLNESS
[FreeTextEntry1] : I saw Hue Guzman in the office today for cardiac follow up.  She was last seen in the office about a year ago.\par \par She is now 67 years old, with a history of asthma.  She has a history of hypertension and hyperlipidemia.  She was discovered to have atrial fibrillation in April 2020, at which time she presented to [facility] with palpitations.  Given the brevity of her atrial fibrillation, she was not treated with anticoagulation at that time.  She has had episodes of palpitations, which have not clearly represented atrial arrhythmia, and she has remained without anticoagulant therapy.  She has had hyponatremia, for which she was admitted to the hospital, at which time hydrochlorothiazide and valsartan were discontinued.\par \par She presents to the office today having been feeling well.  She reports no chest discomfort or shortness of breath suggestive of angina.  She denies orthopnea, PND and lower extremity edema.  She denies palpitations, dizziness and syncope.   Her blood pressure medication has been changed, and she now takes propranolol instead of metoprolol.  The propranolol is taken at least 3 times daily, and she usually takes  40 or 50 mg daily, with the higher doses if she has a spike.  She says that she will frequently have a spike in her blood pressure in the evenings.  She describes that she will feel flushed and warm, and she will check her blood pressure, and it is elevated.  She defines this elevation as being "138 ".  She was given hydralazine 10mg tabs to take, but stopped because she thought it was making her heart rate slow.  She had previously been taken off of spironolactone because of hyponatremia, but went back to taking it on her own.

## 2022-12-02 ENCOUNTER — RX RENEWAL (OUTPATIENT)
Age: 67
End: 2022-12-02

## 2022-12-05 ENCOUNTER — RX RENEWAL (OUTPATIENT)
Age: 67
End: 2022-12-05

## 2022-12-08 ENCOUNTER — APPOINTMENT (OUTPATIENT)
Dept: CARDIOLOGY | Facility: CLINIC | Age: 67
End: 2022-12-08

## 2022-12-12 ENCOUNTER — RX RENEWAL (OUTPATIENT)
Age: 67
End: 2022-12-12

## 2023-01-09 RX ORDER — DILTIAZEM HYDROCHLORIDE 180 MG/1
180 CAPSULE, EXTENDED RELEASE ORAL
Qty: 180 | Refills: 3 | Status: ACTIVE | COMMUNITY
Start: 2022-12-02

## 2023-02-15 VITALS
TEMPERATURE: 97.2 F | WEIGHT: 123 LBS | DIASTOLIC BLOOD PRESSURE: 66 MMHG | HEIGHT: 62 IN | BODY MASS INDEX: 22.63 KG/M2 | SYSTOLIC BLOOD PRESSURE: 122 MMHG

## 2023-03-01 ENCOUNTER — OFFICE (OUTPATIENT)
Dept: URBAN - METROPOLITAN AREA CLINIC 109 | Facility: CLINIC | Age: 68
Setting detail: OPHTHALMOLOGY
End: 2023-03-01
Payer: MEDICARE

## 2023-03-01 DIAGNOSIS — H40.013: ICD-10-CM

## 2023-03-01 DIAGNOSIS — H16.221: ICD-10-CM

## 2023-03-01 DIAGNOSIS — H16.222: ICD-10-CM

## 2023-03-01 PROCEDURE — 83861 MICROFLUID ANALY TEARS: CPT | Performed by: OPHTHALMOLOGY

## 2023-03-01 PROCEDURE — 99213 OFFICE O/P EST LOW 20 MIN: CPT | Performed by: OPHTHALMOLOGY

## 2023-03-01 ASSESSMENT — REFRACTION_CURRENTRX
OS_CYLINDER: -0.50
OS_SPHERE: +1.50
OD_CYLINDER: -1.00
OS_ADD: +1.75
OS_AXIS: 90
OD_OVR_VA: 20/
OD_ADD: +1.75
OS_OVR_VA: 20/
OD_SPHERE: +1.50
OD_AXIS: 62

## 2023-03-01 ASSESSMENT — AXIALLENGTH_DERIVED
OD_AL: 23.7038
OD_AL: 22.8507
OS_AL: 22.7018

## 2023-03-01 ASSESSMENT — REFRACTION_AUTOREFRACTION
OS_CYLINDER: -0.75
OS_SPHERE: +1.75
OD_CYLINDER: -0.75
OS_AXIS: 94
OD_AXIS: 69
OD_SPHERE: -0.50

## 2023-03-01 ASSESSMENT — KERATOMETRY
OD_K2POWER_DIOPTERS: 45.00
OD_AXISANGLE_DEGREES: 109
OS_K2POWER_DIOPTERS: 44.75
OD_K1POWER_DIOPTERS: 43.25
OS_K1POWER_DIOPTERS: 44.37
OS_AXISANGLE_DEGREES: 87

## 2023-03-01 ASSESSMENT — SPHEQUIV_DERIVED
OD_SPHEQUIV: -0.875
OD_SPHEQUIV: 1.375
OS_SPHEQUIV: 1.375

## 2023-03-01 ASSESSMENT — CONFRONTATIONAL VISUAL FIELD TEST (CVF)
OS_FINDINGS: FULL
OD_FINDINGS: FULL

## 2023-03-01 ASSESSMENT — REFRACTION_MANIFEST
OD_ADD: +2.50
OD_AXIS: 29
OD_SPHERE: +0.50
OD_CYLINDER: -0.25
OS_SPHERE: +1.50
OS_ADD: +2.50
OS_ADD: +2.50
OS_SPHERE: -0.50
OD_VA1: 20/30+
OS_VA1: 20/25
OD_ADD: +2.50
OD_SPHERE: +1.50

## 2023-03-01 ASSESSMENT — SUPERFICIAL PUNCTATE KERATITIS (SPK)
OS_SPK: 2+
OD_SPK: 2+

## 2023-03-01 ASSESSMENT — VISUAL ACUITY
OS_BCVA: 20/25-2
OD_BCVA: 20/25

## 2023-03-27 ENCOUNTER — NON-APPOINTMENT (OUTPATIENT)
Age: 68
End: 2023-03-27

## 2023-03-27 DIAGNOSIS — Z63.4 DISAPPEARANCE AND DEATH OF FAMILY MEMBER: ICD-10-CM

## 2023-03-27 DIAGNOSIS — Z60.2 PROBLEMS RELATED TO LIVING ALONE: ICD-10-CM

## 2023-03-27 SDOH — SOCIAL STABILITY - SOCIAL INSECURITY: DISSAPEARANCE AND DEATH OF FAMILY MEMBER: Z63.4

## 2023-03-27 SDOH — SOCIAL STABILITY - SOCIAL INSECURITY: PROBLEMS RELATED TO LIVING ALONE: Z60.2

## 2023-04-25 ENCOUNTER — OFFICE (OUTPATIENT)
Dept: URBAN - METROPOLITAN AREA CLINIC 109 | Facility: CLINIC | Age: 68
Setting detail: OPHTHALMOLOGY
End: 2023-04-25
Payer: MEDICARE

## 2023-04-25 DIAGNOSIS — D31.31: ICD-10-CM

## 2023-04-25 DIAGNOSIS — H40.013: ICD-10-CM

## 2023-04-25 DIAGNOSIS — H16.221: ICD-10-CM

## 2023-04-25 DIAGNOSIS — H16.222: ICD-10-CM

## 2023-04-25 PROCEDURE — 99213 OFFICE O/P EST LOW 20 MIN: CPT | Performed by: OPHTHALMOLOGY

## 2023-04-25 PROCEDURE — 92250 FUNDUS PHOTOGRAPHY W/I&R: CPT | Performed by: OPHTHALMOLOGY

## 2023-04-25 ASSESSMENT — TONOMETRY
OD_IOP_MMHG: 15
OS_IOP_MMHG: 14

## 2023-04-25 ASSESSMENT — KERATOMETRY
OD_AXISANGLE_DEGREES: 109
OD_K1POWER_DIOPTERS: 43.25
OS_K2POWER_DIOPTERS: 44.75
OS_K1POWER_DIOPTERS: 44.37
OS_AXISANGLE_DEGREES: 87
OD_K2POWER_DIOPTERS: 45.00

## 2023-04-25 ASSESSMENT — REFRACTION_MANIFEST
OD_ADD: +2.50
OD_CYLINDER: -0.25
OD_AXIS: 29
OS_ADD: +2.50
OS_ADD: +2.50
OS_VA1: 20/25
OD_VA1: 20/30+
OS_SPHERE: +1.50
OS_SPHERE: -0.50
OD_ADD: +2.50
OD_SPHERE: +1.50
OD_SPHERE: +0.50

## 2023-04-25 ASSESSMENT — PACHYMETRY
OS_CT_CORRECTION: 6
OD_CT_UM: 518
OS_CT_UM: 463
OD_CT_CORRECTION: 2

## 2023-04-25 ASSESSMENT — REFRACTION_CURRENTRX
OS_SPHERE: +1.50
OS_AXIS: 90
OS_ADD: +1.75
OD_OVR_VA: 20/
OD_AXIS: 62
OD_SPHERE: +1.50
OD_CYLINDER: -1.00
OS_OVR_VA: 20/
OS_CYLINDER: -0.50
OD_ADD: +1.75

## 2023-04-25 ASSESSMENT — REFRACTION_AUTOREFRACTION
OS_AXIS: 94
OD_SPHERE: -0.50
OD_CYLINDER: -0.75
OS_SPHERE: +1.75
OD_AXIS: 69
OS_CYLINDER: -0.75

## 2023-04-25 ASSESSMENT — CONFRONTATIONAL VISUAL FIELD TEST (CVF)
OS_FINDINGS: FULL
OD_FINDINGS: FULL

## 2023-04-25 ASSESSMENT — VISUAL ACUITY
OS_BCVA: 20/25-2
OD_BCVA: 20/25+2

## 2023-04-25 ASSESSMENT — SPHEQUIV_DERIVED
OD_SPHEQUIV: -0.875
OD_SPHEQUIV: 1.375
OS_SPHEQUIV: 1.375

## 2023-04-25 ASSESSMENT — AXIALLENGTH_DERIVED
OD_AL: 23.7038
OS_AL: 22.7018
OD_AL: 22.8507

## 2023-04-25 ASSESSMENT — SUPERFICIAL PUNCTATE KERATITIS (SPK)
OD_SPK: 2+
OS_SPK: 2+

## 2023-05-08 ENCOUNTER — RX ONLY (RX ONLY)
Age: 68
End: 2023-05-08

## 2023-05-08 ENCOUNTER — OFFICE (OUTPATIENT)
Dept: URBAN - METROPOLITAN AREA CLINIC 32 | Facility: CLINIC | Age: 68
Setting detail: OPHTHALMOLOGY
End: 2023-05-08
Payer: MEDICARE

## 2023-05-08 DIAGNOSIS — D31.31: ICD-10-CM

## 2023-05-08 DIAGNOSIS — H43.393: ICD-10-CM

## 2023-05-08 PROCEDURE — 92012 INTRM OPH EXAM EST PATIENT: CPT | Performed by: OPHTHALMOLOGY

## 2023-05-08 PROCEDURE — 92134 CPTRZ OPH DX IMG PST SGM RTA: CPT | Performed by: OPHTHALMOLOGY

## 2023-05-08 ASSESSMENT — REFRACTION_AUTOREFRACTION
OS_AXIS: 94
OD_SPHERE: -0.50
OS_SPHERE: +1.75
OD_CYLINDER: -0.75
OD_AXIS: 69
OS_CYLINDER: -0.75

## 2023-05-08 ASSESSMENT — KERATOMETRY
OS_K2POWER_DIOPTERS: 44.75
OD_AXISANGLE_DEGREES: 109
OD_K2POWER_DIOPTERS: 45.00
OS_K1POWER_DIOPTERS: 44.37
OD_K1POWER_DIOPTERS: 43.25
OS_AXISANGLE_DEGREES: 87

## 2023-05-08 ASSESSMENT — AXIALLENGTH_DERIVED
OS_AL: 22.7018
OD_AL: 23.7038

## 2023-05-08 ASSESSMENT — SPHEQUIV_DERIVED
OD_SPHEQUIV: -0.875
OS_SPHEQUIV: 1.375

## 2023-05-08 ASSESSMENT — VISUAL ACUITY
OD_BCVA: 20/40
OS_BCVA: 20/25

## 2023-05-08 ASSESSMENT — SUPERFICIAL PUNCTATE KERATITIS (SPK)
OD_SPK: 2+
OS_SPK: 2+

## 2023-08-09 ENCOUNTER — RX RENEWAL (OUTPATIENT)
Age: 68
End: 2023-08-09

## 2023-10-20 ENCOUNTER — LABORATORY RESULT (OUTPATIENT)
Age: 68
End: 2023-10-20

## 2023-10-24 LAB
ALBUMIN SERPL ELPH-MCNC: 4 G/DL
ANION GAP SERPL CALC-SCNC: 9 MMOL/L
APPEARANCE: CLEAR
BACTERIA: NEGATIVE /HPF
BASOPHILS # BLD AUTO: 0.03 K/UL
BASOPHILS NFR BLD AUTO: 0.4 %
BILIRUBIN URINE: NEGATIVE
BLOOD URINE: NEGATIVE
BUN SERPL-MCNC: 7 MG/DL
CALCIUM SERPL-MCNC: 9.2 MG/DL
CAST: 0 /LPF
CHLORIDE SERPL-SCNC: 91 MMOL/L
CO2 SERPL-SCNC: 29 MMOL/L
COLOR: YELLOW
CREAT SERPL-MCNC: 0.63 MG/DL
CREAT SPEC-SCNC: 20 MG/DL
CREAT/PROT UR: NORMAL RATIO
EGFR: 96 ML/MIN/1.73M2
EOSINOPHIL # BLD AUTO: 0.04 K/UL
EOSINOPHIL NFR BLD AUTO: 0.6 %
EPITHELIAL CELLS: 3 /HPF
GLUCOSE QUALITATIVE U: NEGATIVE MG/DL
GLUCOSE SERPL-MCNC: 91 MG/DL
HCT VFR BLD CALC: 32 %
HGB BLD-MCNC: 10.4 G/DL
IMM GRANULOCYTES NFR BLD AUTO: 0.3 %
KETONES URINE: NEGATIVE MG/DL
LEUKOCYTE ESTERASE URINE: ABNORMAL
LYMPHOCYTES # BLD AUTO: 1.63 K/UL
LYMPHOCYTES NFR BLD AUTO: 24 %
MAN DIFF?: NORMAL
MCHC RBC-ENTMCNC: 30.1 PG
MCHC RBC-ENTMCNC: 32.5 GM/DL
MCV RBC AUTO: 92.5 FL
MICROSCOPIC-UA: NORMAL
MONOCYTES # BLD AUTO: 0.7 K/UL
MONOCYTES NFR BLD AUTO: 10.3 %
NEUTROPHILS # BLD AUTO: 4.38 K/UL
NEUTROPHILS NFR BLD AUTO: 64.4 %
NITRITE URINE: NEGATIVE
OSMOLALITY UR: 140 MOSM/KG
PH URINE: 8
PHOSPHATE SERPL-MCNC: 3.6 MG/DL
PLATELET # BLD AUTO: 412 K/UL
POTASSIUM SERPL-SCNC: 5.4 MMOL/L
PROT UR-MCNC: <4 MG/DL
PROTEIN URINE: NEGATIVE MG/DL
RBC # BLD: 3.46 M/UL
RBC # FLD: 14.8 %
RED BLOOD CELLS URINE: 1 /HPF
REVIEW: NORMAL
SODIUM ?TM SUB UR QN: <20 MMOL/L
SODIUM SERPL-SCNC: 129 MMOL/L
SPECIFIC GRAVITY URINE: 1.01
UROBILINOGEN URINE: 0.2 MG/DL
WBC # FLD AUTO: 6.8 K/UL
WHITE BLOOD CELLS URINE: 1 /HPF

## 2023-10-27 ENCOUNTER — APPOINTMENT (OUTPATIENT)
Dept: NEPHROLOGY | Facility: CLINIC | Age: 68
End: 2023-10-27
Payer: MEDICARE

## 2023-10-27 VITALS
WEIGHT: 131 LBS | HEIGHT: 62 IN | BODY MASS INDEX: 24.11 KG/M2 | DIASTOLIC BLOOD PRESSURE: 62 MMHG | HEART RATE: 62 BPM | OXYGEN SATURATION: 98 % | SYSTOLIC BLOOD PRESSURE: 124 MMHG | TEMPERATURE: 97.6 F

## 2023-10-27 DIAGNOSIS — E87.1 HYPO-OSMOLALITY AND HYPONATREMIA: ICD-10-CM

## 2023-10-27 DIAGNOSIS — F41.9 ANXIETY DISORDER, UNSPECIFIED: ICD-10-CM

## 2023-10-27 PROCEDURE — 99214 OFFICE O/P EST MOD 30 MIN: CPT

## 2023-10-27 RX ORDER — HYDRALAZINE HYDROCHLORIDE 10 MG/1
10 TABLET ORAL
Refills: 0 | Status: DISCONTINUED | COMMUNITY
End: 2023-10-27

## 2023-10-27 RX ORDER — DILTIAZEM HYDROCHLORIDE 240 MG/1
240 TABLET, EXTENDED RELEASE ORAL
Qty: 90 | Refills: 0 | Status: DISCONTINUED | COMMUNITY
Start: 2020-09-14 | End: 2023-10-27

## 2023-10-27 RX ORDER — PREGABALIN 25 MG/1
25 CAPSULE ORAL
Refills: 0 | Status: DISCONTINUED | COMMUNITY
End: 2023-10-27

## 2023-10-27 RX ORDER — METOPROLOL TARTRATE 25 MG/1
25 TABLET, FILM COATED ORAL
Refills: 0 | Status: DISCONTINUED | COMMUNITY
End: 2023-10-27

## 2023-10-27 RX ORDER — PROPRANOLOL HYDROCHLORIDE 10 MG/1
10 TABLET ORAL 3 TIMES DAILY
Refills: 0 | Status: DISCONTINUED | COMMUNITY
End: 2023-10-27

## 2023-10-27 RX ORDER — DILTIAZEM HYDROCHLORIDE 360 MG/1
360 CAPSULE, COATED, EXTENDED RELEASE ORAL
Qty: 90 | Refills: 3 | Status: DISCONTINUED | COMMUNITY
Start: 2022-12-12 | End: 2023-10-27

## 2023-10-27 RX ORDER — DULOXETINE HYDROCHLORIDE 30 MG/1
30 CAPSULE, DELAYED RELEASE PELLETS ORAL DAILY
Refills: 0 | Status: DISCONTINUED | COMMUNITY
End: 2023-10-27

## 2023-10-27 RX ORDER — PROPRANOLOL HCL 10 MG
10 TABLET ORAL
Refills: 0 | Status: DISCONTINUED | COMMUNITY
End: 2023-10-27

## 2023-10-27 RX ORDER — VALSARTAN 320 MG/1
320 TABLET, COATED ORAL
Qty: 90 | Refills: 0 | Status: DISCONTINUED | COMMUNITY
Start: 2021-07-09 | End: 2023-10-27

## 2023-10-27 RX ORDER — SPIRONOLACTONE 25 MG/1
25 TABLET ORAL DAILY
Qty: 90 | Refills: 3 | Status: DISCONTINUED | COMMUNITY
Start: 2021-03-18 | End: 2023-10-27

## 2023-10-27 RX ORDER — METHYLPREDNISOLONE 4 MG/1
4 TABLET ORAL
Qty: 1 | Refills: 1 | Status: DISCONTINUED | COMMUNITY
Start: 2022-05-06 | End: 2023-10-27

## 2024-02-16 NOTE — DISCHARGE NOTE ADULT - NSFTFSTATUSABRD_GEN_ALL_CORE
Patient's  called to request refill of donepezil. He was informed that an order from the pharmacy is pending approval.    Added a refill to the order.   PATIENT NEEDS ASSISTANCE TO LEAVE RESIDENCE:

## 2024-02-24 NOTE — H&P ADULT - ATTENDING COMMENTS
Labs ordered today  Pt is currently taking mounjaro from a compounded pharmacy for weight loss  Advised pt that she should not take it as they are unsafe   Advised pt to call insurance and see ifg wegovy or zepbound is covered   Pt seen and examined. Agree with note/plan.   Leukocytosis improving, afebrile today.  F/U blood cultures  PT/OT

## 2024-04-12 ENCOUNTER — OFFICE (OUTPATIENT)
Dept: URBAN - METROPOLITAN AREA CLINIC 70 | Facility: CLINIC | Age: 69
Setting detail: OPHTHALMOLOGY
End: 2024-04-12
Payer: MEDICARE

## 2024-04-12 DIAGNOSIS — H00.12: ICD-10-CM

## 2024-04-12 PROCEDURE — 99213 OFFICE O/P EST LOW 20 MIN: CPT | Performed by: STUDENT IN AN ORGANIZED HEALTH CARE EDUCATION/TRAINING PROGRAM

## 2024-04-26 ENCOUNTER — OFFICE (OUTPATIENT)
Dept: URBAN - METROPOLITAN AREA CLINIC 109 | Facility: CLINIC | Age: 69
Setting detail: OPHTHALMOLOGY
End: 2024-04-26
Payer: MEDICARE

## 2024-04-26 DIAGNOSIS — H00.12: ICD-10-CM

## 2024-04-26 DIAGNOSIS — H40.013: ICD-10-CM

## 2024-04-26 PROCEDURE — 92133 CPTRZD OPH DX IMG PST SGM ON: CPT | Performed by: OPHTHALMOLOGY

## 2024-04-26 PROCEDURE — 92083 EXTENDED VISUAL FIELD XM: CPT | Performed by: OPHTHALMOLOGY

## 2024-04-26 PROCEDURE — 92020 GONIOSCOPY: CPT | Performed by: OPHTHALMOLOGY

## 2024-04-26 PROCEDURE — 92012 INTRM OPH EXAM EST PATIENT: CPT | Performed by: OPHTHALMOLOGY

## 2024-05-02 ENCOUNTER — OFFICE (OUTPATIENT)
Dept: URBAN - METROPOLITAN AREA CLINIC 32 | Facility: CLINIC | Age: 69
Setting detail: OPHTHALMOLOGY
End: 2024-05-02
Payer: MEDICARE

## 2024-05-02 DIAGNOSIS — H43.393: ICD-10-CM

## 2024-05-02 DIAGNOSIS — D31.31: ICD-10-CM

## 2024-05-02 PROBLEM — H00.12 CHALAZION; RIGHT LOWER LID: Status: ACTIVE | Noted: 2024-04-12

## 2024-05-02 PROCEDURE — 92134 CPTRZ OPH DX IMG PST SGM RTA: CPT | Performed by: OPHTHALMOLOGY

## 2024-05-02 PROCEDURE — 99213 OFFICE O/P EST LOW 20 MIN: CPT | Performed by: OPHTHALMOLOGY

## 2024-05-02 PROCEDURE — 76512 OPH US DX B-SCAN: CPT | Mod: RT | Performed by: OPHTHALMOLOGY

## 2024-05-02 ASSESSMENT — CONFRONTATIONAL VISUAL FIELD TEST (CVF)
OS_FINDINGS: FULL
OD_FINDINGS: FULL

## 2024-05-17 ENCOUNTER — APPOINTMENT (OUTPATIENT)
Dept: CARDIOLOGY | Facility: CLINIC | Age: 69
End: 2024-05-17
Payer: MEDICARE

## 2024-05-17 ENCOUNTER — NON-APPOINTMENT (OUTPATIENT)
Age: 69
End: 2024-05-17

## 2024-05-17 VITALS
WEIGHT: 128 LBS | HEART RATE: 64 BPM | DIASTOLIC BLOOD PRESSURE: 60 MMHG | BODY MASS INDEX: 23.41 KG/M2 | OXYGEN SATURATION: 97 % | SYSTOLIC BLOOD PRESSURE: 122 MMHG

## 2024-05-17 DIAGNOSIS — J45.909 UNSPECIFIED ASTHMA, UNCOMPLICATED: ICD-10-CM

## 2024-05-17 DIAGNOSIS — I48.0 PAROXYSMAL ATRIAL FIBRILLATION: ICD-10-CM

## 2024-05-17 DIAGNOSIS — E78.5 HYPERLIPIDEMIA, UNSPECIFIED: ICD-10-CM

## 2024-05-17 DIAGNOSIS — R00.2 PALPITATIONS: ICD-10-CM

## 2024-05-17 DIAGNOSIS — I10 ESSENTIAL (PRIMARY) HYPERTENSION: ICD-10-CM

## 2024-05-17 PROCEDURE — 99215 OFFICE O/P EST HI 40 MIN: CPT

## 2024-05-17 PROCEDURE — G2211 COMPLEX E/M VISIT ADD ON: CPT

## 2024-05-17 PROCEDURE — 93000 ELECTROCARDIOGRAM COMPLETE: CPT

## 2024-05-17 RX ORDER — SPIRONOLACTONE 25 MG/1
25 TABLET ORAL
Qty: 90 | Refills: 3 | Status: ACTIVE | COMMUNITY
Start: 2023-10-27 | End: 1900-01-01

## 2024-05-17 RX ORDER — SERTRALINE HYDROCHLORIDE 50 MG/1
50 TABLET, FILM COATED ORAL
Qty: 180 | Refills: 3 | Status: DISCONTINUED | COMMUNITY
End: 2024-05-17

## 2024-05-17 RX ORDER — HYDRALAZINE HYDROCHLORIDE 25 MG/1
25 TABLET ORAL
Qty: 90 | Refills: 5 | Status: DISCONTINUED | COMMUNITY
Start: 2023-08-09 | End: 2024-05-17

## 2024-05-17 RX ORDER — DULOXETINE HYDROCHLORIDE 30 MG/1
30 CAPSULE, DELAYED RELEASE ORAL
Refills: 0 | Status: ACTIVE | COMMUNITY

## 2024-05-26 NOTE — ASSESSMENT
Up with assistance and a walker   [FreeTextEntry1] : After discussing various treatment options with the patient including but not limited to oral medications, physical therapy, exercise, modalities as well as interventional spinal injections, we have decided with the following plan:\par \par 1) Intervention Injection Therapy:\par I personally reviewed the MRI/CT scan images and agree with the radiologist's report. The radiological findings were discussed with the patient.\par The risks, benefits, contents and alternatives to injection were explained in full to the patient. Risks outlined include but are not limited to infection,sepsis, bleeding, post-dural puncture headache, nerve damage, temporary increase in pain, syncopal episode, failure to resolve symptoms, allergic reaction, symptom recurrence, and elevation of blood sugar in diabetics. Cortisone may cause immunosuppression. Patient understands the risks. All questions were answered. After discussion of options, patient requested an injection. Information regarding the injection was given to the patient. Which medications to stop prior to the injection was explained to the patient as well.\par \par Follow up in 1-2 weeks post injection for re-evaluation. \par Continue Home exercises, stretching, activity modification, physical therapy, and conservative care.\par \par Patient is presenting with acute/sub-acute radicular pain with impairment in ADLs and functionality.  The pain has not responded to  conservative care including nsaid therapy and/or physical therapy.  There is no bleeding tendency, unstable medical condition, or systemic infection. \par

## 2024-06-02 RX ORDER — CARVEDILOL 3.12 MG/1
3.12 TABLET, FILM COATED ORAL TWICE DAILY
Qty: 180 | Refills: 3 | Status: ACTIVE | COMMUNITY
Start: 2024-05-17 | End: 1900-01-01

## 2024-06-03 ENCOUNTER — APPOINTMENT (OUTPATIENT)
Dept: ORTHOPEDIC SURGERY | Facility: CLINIC | Age: 69
End: 2024-06-03

## 2024-06-16 PROBLEM — I48.0 PAROXYSMAL ATRIAL FIBRILLATION: Status: ACTIVE | Noted: 2020-04-24

## 2024-06-16 PROBLEM — E78.5 HYPERLIPIDEMIA: Status: ACTIVE | Noted: 2020-04-29

## 2024-06-16 PROBLEM — I10 HYPERTENSION, UNCONTROLLED: Status: ACTIVE | Noted: 2020-05-27

## 2024-06-16 NOTE — DISCUSSION/SUMMARY
[FreeTextEntry1] : Ms. TEENA CORBIN is a very pleasant 68 year woman with a past medical history of pAFib, HTN, HLD presenting for evaluation of palpitations.   #Palpitations - Encouraged to decrease caffeine intake as much as possible - Encourage to continue exercise - ECG sinus rhythm today - 14 day Ziopatch ordered to assess for arrhythmia - Echocardiogram to assess for structural heart abnormalities.  - Stress-lowering therapies and exercises were strongly recommended  #HTN: well controlled - switch from diltiazem to coreg - continue aldactone  #Anxiety: continue per PCP  Patient was advised to partake in 150 minutes of moderate exercise per week. Patient was advised to see us in 6 months if stable and certainly earlier with any new symptoms. Patient was advised to contact the office or seek medical care for any new or concerning symptoms right away.  Patient verbalized understanding and is in agreement with the above plan.  [EKG obtained to assist in diagnosis and management of assessed problem(s)] : EKG obtained to assist in diagnosis and management of assessed problem(s)

## 2024-06-16 NOTE — HISTORY OF PRESENT ILLNESS
[FreeTextEntry1] : Ms. TEENA CORBIN is a very pleasant 68-year woman with a past medical history of pAF, anxiety, asthma, HTN presenting today for evaluation of palpitations. She has been feeling palpitations on and off for the past several months. She has been taking diltiazem daily to no effect. She has additionally been taking her antihypertensives. Denies chest pain at rest or sob at rest. Denies dizziness or lightheadedness.   ECG today shows NSR without ischemic changes. BP well controlled at 122/60.   Otherwise, denies orthopnea, paroxysmal nocturnal dyspnea, lower extremity edema, unexplained weight gain or dyspnea on exertion.

## 2024-06-17 ENCOUNTER — APPOINTMENT (OUTPATIENT)
Dept: ORTHOPEDIC SURGERY | Facility: CLINIC | Age: 69
End: 2024-06-17

## 2024-07-01 ENCOUNTER — APPOINTMENT (OUTPATIENT)
Dept: ORTHOPEDIC SURGERY | Facility: CLINIC | Age: 69
End: 2024-07-01

## 2024-08-08 ENCOUNTER — RX RENEWAL (OUTPATIENT)
Age: 69
End: 2024-08-08

## 2024-10-11 DIAGNOSIS — E87.1 HYPO-OSMOLALITY AND HYPONATREMIA: ICD-10-CM

## 2024-10-25 ENCOUNTER — APPOINTMENT (OUTPATIENT)
Dept: NEPHROLOGY | Facility: CLINIC | Age: 69
End: 2024-10-25
Payer: MEDICARE

## 2024-10-25 VITALS
BODY MASS INDEX: 23.55 KG/M2 | SYSTOLIC BLOOD PRESSURE: 122 MMHG | TEMPERATURE: 95.6 F | WEIGHT: 128 LBS | HEIGHT: 62 IN | DIASTOLIC BLOOD PRESSURE: 60 MMHG

## 2024-10-25 DIAGNOSIS — E87.1 HYPO-OSMOLALITY AND HYPONATREMIA: ICD-10-CM

## 2024-10-25 DIAGNOSIS — I10 ESSENTIAL (PRIMARY) HYPERTENSION: ICD-10-CM

## 2024-10-25 PROCEDURE — 99214 OFFICE O/P EST MOD 30 MIN: CPT

## 2024-11-11 NOTE — H&P ADULT - NSCORESITESY/N_GEN_A_CORE_RD
Problem: At Risk for Falls  Goal: Patient does not fall  Outcome: Monitoring/Evaluating progress     Problem: At Risk for Injury Due to Fall  Goal: Patient does not fall  Outcome: Monitoring/Evaluating progress     Problem: Pain  Goal: Acceptable pain level achieved/maintained at rest using appropriate pain scale for the patient  Outcome: Monitoring/Evaluating progress      No

## 2024-11-26 NOTE — INPATIENT CERTIFICATION FOR MEDICARE PATIENTS - RISKS OF ADVERSE EVENTS
Chief Complaint   Patient presents with    Hypertension    Diabetes     History of Present Illness  The patient is a 69-year-old female who presents for a 4-month follow-up evaluation of hypertension and type 2 diabetes.    She denies hypoglycemic symptoms, polyuria, or polydipsia. She takes Mounjaro, metformin, and glipizide. She is usually compliant with a diabetic. Lab review: A1c 5.7% on 11/26/24 (today), 6.3% on 7/25/24, 6.0% on 4/22/24, 6.1% on 8/21/23.    She denies headaches, CP, SOB, dizziness, heart palpitations, or leg swelling. Her blood pressure readings at home are typically below 130/80.     She is on Mounjaro for diabetes and weight loss. Mounjaro 5 mg weekly caused left-sided abdominal cramping and nausea. She has been managing her Mounjaro 5 mg by taking it every other week, which has helped alleviate the nausea and abdominal pain. She has noticed significant weight loss over the past 4 months, with her weight dropping from 212 to 199 pounds. She is concerned about losing too much weight and has been advised by a  to aim for a weight of 186 pounds. She is currently engaging in regular exercise. Her ankle swelling has improved since her weight loss.    She has a burning sensation in her neck. She has not taken pregabalin since her last prescription ran out and has been managing the pain with massages and pain gel.     She expresses concern about her kidney function due to high urine protein levels detected in her last test. She also mentions a chronic issue with her white and red blood cells. She takes taking sea cote, a natural supplement, that she believes helps with weight loss. She has also given it to her .    Patient Active Problem List   Diagnosis    Carpal tunnel syndrome of right wrist    Osteopenia of multiple sites    Iron deficiency anemia    NAFLD (nonalcoholic fatty liver disease)    Status post lumbar spinal fusion    Type 2 diabetes mellitus without complication,  Concern for worsening infectious process

## 2024-12-09 ENCOUNTER — APPOINTMENT (OUTPATIENT)
Dept: CARDIOLOGY | Facility: CLINIC | Age: 69
End: 2024-12-09
Payer: MEDICARE

## 2024-12-09 VITALS
SYSTOLIC BLOOD PRESSURE: 136 MMHG | OXYGEN SATURATION: 99 % | DIASTOLIC BLOOD PRESSURE: 71 MMHG | WEIGHT: 125 LBS | HEART RATE: 59 BPM | HEIGHT: 62 IN | BODY MASS INDEX: 23 KG/M2

## 2024-12-09 DIAGNOSIS — I10 ESSENTIAL (PRIMARY) HYPERTENSION: ICD-10-CM

## 2024-12-09 DIAGNOSIS — E78.5 HYPERLIPIDEMIA, UNSPECIFIED: ICD-10-CM

## 2024-12-09 DIAGNOSIS — I48.0 PAROXYSMAL ATRIAL FIBRILLATION: ICD-10-CM

## 2024-12-09 DIAGNOSIS — E78.00 PURE HYPERCHOLESTEROLEMIA, UNSPECIFIED: ICD-10-CM

## 2024-12-09 DIAGNOSIS — R07.9 CHEST PAIN, UNSPECIFIED: ICD-10-CM

## 2024-12-09 PROCEDURE — 99214 OFFICE O/P EST MOD 30 MIN: CPT

## 2024-12-09 PROCEDURE — 93000 ELECTROCARDIOGRAM COMPLETE: CPT

## 2024-12-09 PROCEDURE — G2211 COMPLEX E/M VISIT ADD ON: CPT

## 2024-12-20 ENCOUNTER — RESULT REVIEW (OUTPATIENT)
Age: 69
End: 2024-12-20

## 2024-12-20 ENCOUNTER — APPOINTMENT (OUTPATIENT)
Dept: CV DIAGNOSTICS | Facility: HOSPITAL | Age: 69
End: 2024-12-20

## 2024-12-20 ENCOUNTER — APPOINTMENT (OUTPATIENT)
Dept: CARDIOLOGY | Facility: CLINIC | Age: 69
End: 2024-12-20

## 2024-12-20 ENCOUNTER — OUTPATIENT (OUTPATIENT)
Dept: OUTPATIENT SERVICES | Facility: HOSPITAL | Age: 69
LOS: 1 days | End: 2024-12-20
Payer: MEDICARE

## 2024-12-20 DIAGNOSIS — M43.22 FUSION OF SPINE, CERVICAL REGION: Chronic | ICD-10-CM

## 2024-12-20 PROCEDURE — 78452 HT MUSCLE IMAGE SPECT MULT: CPT | Mod: 26,MC

## 2024-12-20 PROCEDURE — A9500: CPT

## 2024-12-20 PROCEDURE — 78452 HT MUSCLE IMAGE SPECT MULT: CPT | Mod: MC

## 2024-12-20 PROCEDURE — 93018 CV STRESS TEST I&R ONLY: CPT | Mod: MC

## 2024-12-20 PROCEDURE — 93306 TTE W/DOPPLER COMPLETE: CPT

## 2024-12-20 PROCEDURE — 93017 CV STRESS TEST TRACING ONLY: CPT

## 2024-12-20 PROCEDURE — 93016 CV STRESS TEST SUPVJ ONLY: CPT | Mod: MC

## 2025-01-17 NOTE — ED ADULT TRIAGE NOTE - HEIGHT IN FEET
Dental Pain/Facial Swelling
5
Xolair Pregnancy And Lactation Text: This medication is Pregnancy Category B and is considered safe during pregnancy. This medication is excreted in breast milk.

## 2025-01-29 ENCOUNTER — RX RENEWAL (OUTPATIENT)
Age: 70
End: 2025-01-29

## 2025-02-07 ENCOUNTER — APPOINTMENT (OUTPATIENT)
Dept: NEPHROLOGY | Facility: CLINIC | Age: 70
End: 2025-02-07

## 2025-05-16 DIAGNOSIS — I10 ESSENTIAL (PRIMARY) HYPERTENSION: ICD-10-CM

## 2025-05-16 DIAGNOSIS — E87.1 HYPO-OSMOLALITY AND HYPONATREMIA: ICD-10-CM

## 2025-06-02 ENCOUNTER — APPOINTMENT (OUTPATIENT)
Facility: CLINIC | Age: 70
End: 2025-06-02

## 2025-06-02 VITALS
HEIGHT: 62 IN | RESPIRATION RATE: 18 BRPM | BODY MASS INDEX: 24.34 KG/M2 | OXYGEN SATURATION: 97 % | TEMPERATURE: 97.5 F | HEART RATE: 79 BPM | SYSTOLIC BLOOD PRESSURE: 124 MMHG | WEIGHT: 132.25 LBS | DIASTOLIC BLOOD PRESSURE: 65 MMHG

## 2025-06-02 DIAGNOSIS — Z82.5 FAMILY HISTORY OF ASTHMA AND OTHER CHRONIC LOWER RESPIRATORY DISEASES: ICD-10-CM

## 2025-06-02 DIAGNOSIS — J20.9 ACUTE BRONCHITIS, UNSPECIFIED: ICD-10-CM

## 2025-06-02 DIAGNOSIS — J45.909 ACUTE BRONCHITIS, UNSPECIFIED: ICD-10-CM

## 2025-06-02 DIAGNOSIS — J98.11 ATELECTASIS: ICD-10-CM

## 2025-06-02 DIAGNOSIS — Z87.01 PERSONAL HISTORY OF PNEUMONIA (RECURRENT): ICD-10-CM

## 2025-06-02 DIAGNOSIS — R06.89 OTHER ABNORMALITIES OF BREATHING: ICD-10-CM

## 2025-06-02 DIAGNOSIS — J45.909 UNSPECIFIED ASTHMA, UNCOMPLICATED: ICD-10-CM

## 2025-06-02 RX ORDER — ALBUTEROL SULFATE 2.5 MG/3ML
(2.5 MG/3ML) SOLUTION RESPIRATORY (INHALATION)
Qty: 1 | Refills: 3 | Status: ACTIVE | COMMUNITY
Start: 2025-06-02 | End: 1900-01-01

## 2025-06-02 RX ORDER — DOXYCYCLINE HYCLATE 100 MG/1
100 TABLET, COATED ORAL
Qty: 20 | Refills: 0 | Status: ACTIVE | COMMUNITY
Start: 2025-06-02 | End: 1900-01-01

## 2025-06-02 RX ORDER — ALBUTEROL SULFATE 90 UG/1
108 (90 BASE) INHALANT RESPIRATORY (INHALATION)
Qty: 2 | Refills: 3 | Status: ACTIVE | COMMUNITY
Start: 2025-06-02 | End: 1900-01-01

## 2025-06-02 RX ORDER — PREDNISONE 20 MG/1
20 TABLET ORAL DAILY
Qty: 10 | Refills: 1 | Status: ACTIVE | COMMUNITY
Start: 2025-06-02 | End: 1900-01-01

## 2025-06-03 PROBLEM — J45.909 ASTHMA: Status: RESOLVED | Noted: 2022-06-06 | Resolved: 2025-06-03

## 2025-06-09 PROBLEM — Z01.818 PREOP TESTING: Status: RESOLVED | Noted: 2020-06-25 | Resolved: 2025-06-09

## 2025-06-09 PROBLEM — Z98.1 S/P LUMBAR FUSION: Status: RESOLVED | Noted: 2022-05-06 | Resolved: 2025-06-09

## 2025-06-09 PROBLEM — J45.909 CHILDHOOD ASTHMA, UNSPECIFIED ASTHMA SEVERITY, UNSPECIFIED WHETHER COMPLICATED, UNSPECIFIED WHETHER PERSISTENT: Status: RESOLVED | Noted: 2025-06-09 | Resolved: 2025-06-09

## 2025-06-09 RX ORDER — TRIAMCINOLONE ACETONIDE 40 MG/ML
40 SUSPENSION INTRA-ARTERIAL; INTRAMUSCULAR
Refills: 0 | Status: COMPLETED | OUTPATIENT
Start: 2025-06-09

## 2025-06-09 RX ADMIN — TRIAMCINOLONE ACETONIDE 1.5 MG/ML: 40 INJECTION, SUSPENSION INTRA-ARTICULAR; INTRAMUSCULAR at 00:00

## 2025-06-16 ENCOUNTER — APPOINTMENT (OUTPATIENT)
Dept: NEPHROLOGY | Facility: CLINIC | Age: 70
End: 2025-06-16
Payer: MEDICARE

## 2025-06-16 VITALS
DIASTOLIC BLOOD PRESSURE: 58 MMHG | OXYGEN SATURATION: 98 % | RESPIRATION RATE: 16 BRPM | HEART RATE: 80 BPM | WEIGHT: 136 LBS | HEIGHT: 62 IN | SYSTOLIC BLOOD PRESSURE: 120 MMHG | BODY MASS INDEX: 25.03 KG/M2

## 2025-06-16 PROBLEM — I10 HYPERTENSION: Status: ACTIVE | Noted: 2022-06-06

## 2025-06-16 PROCEDURE — 99213 OFFICE O/P EST LOW 20 MIN: CPT

## 2025-06-16 RX ORDER — PRIMIDONE 50 MG/1
50 TABLET ORAL
Refills: 0 | Status: ACTIVE | COMMUNITY

## 2025-06-21 ENCOUNTER — EMERGENCY (EMERGENCY)
Facility: HOSPITAL | Age: 70
LOS: 1 days | End: 2025-06-21
Attending: EMERGENCY MEDICINE | Admitting: EMERGENCY MEDICINE
Payer: MEDICARE

## 2025-06-21 VITALS
RESPIRATION RATE: 14 BRPM | TEMPERATURE: 98 F | OXYGEN SATURATION: 100 % | SYSTOLIC BLOOD PRESSURE: 116 MMHG | DIASTOLIC BLOOD PRESSURE: 70 MMHG | HEIGHT: 62 IN | HEART RATE: 59 BPM | WEIGHT: 119.93 LBS

## 2025-06-21 VITALS
HEART RATE: 68 BPM | OXYGEN SATURATION: 100 % | SYSTOLIC BLOOD PRESSURE: 147 MMHG | TEMPERATURE: 98 F | DIASTOLIC BLOOD PRESSURE: 76 MMHG | RESPIRATION RATE: 16 BRPM

## 2025-06-21 DIAGNOSIS — M43.22 FUSION OF SPINE, CERVICAL REGION: Chronic | ICD-10-CM

## 2025-06-21 PROCEDURE — 70450 CT HEAD/BRAIN W/O DYE: CPT | Mod: 26

## 2025-06-21 PROCEDURE — 99284 EMERGENCY DEPT VISIT MOD MDM: CPT | Mod: FS

## 2025-06-21 PROCEDURE — 70450 CT HEAD/BRAIN W/O DYE: CPT

## 2025-06-21 PROCEDURE — 99284 EMERGENCY DEPT VISIT MOD MDM: CPT | Mod: 25

## 2025-06-21 PROCEDURE — 70486 CT MAXILLOFACIAL W/O DYE: CPT | Mod: 26

## 2025-06-21 PROCEDURE — 70486 CT MAXILLOFACIAL W/O DYE: CPT

## 2025-06-21 NOTE — ED PROVIDER NOTE - CARE PROVIDER_API CALL
[facility]        03/12/25      Dear Ms. Mone Houston    Your [facility] Education telephone session is scheduled for     Friday March 14th  at 11:00 am    The [facility] Coordinator will contact you by telephone to provide the education.  You do not need to log into a computer, the session is done via phone.  The call may show up on caller ID as \"unknown\" \"private\" or \"blocked\" as I may be working remotely.   DO NOT COME TO THE HOSPITAL FOR YOUR CLASS      Family are welcome to participate if able. The session will last approximately 30 minutes.    Please call if you have any questions. I look forward to talking with you!    Thank you,        Rosmery CROWLEY   - [facility]   [address] [place]  [address], [address]  [place] [address]  Phone: [phone]  Fax: [phone]  Follow Up Time: 1-3 Days

## 2025-06-21 NOTE — ED PROVIDER NOTE - CARE PLAN
Principal Discharge DX:	Facial contusion, initial encounter  Secondary Diagnosis:	Closed head injury   1

## 2025-06-21 NOTE — ED ADULT TRIAGE NOTE - MODE OF ARRIVAL
Hourly rounds performed, all needs met. Pain medication given per MAR. Good urine output since méndez removed, yellow/clear. No BM but passing gas. Will continue to monitor pt until nightshift, RN arrives. Walk in [employer]

## 2025-06-21 NOTE — ED ADULT NURSE NOTE - TEMPLATE LIST FOR HEAD TO TOE ASSESSMENT
Continue to monitor urine and stool for signs and symptoms of bleeding. Please notify the clinic of any medication changes. Please remember to bring all medications (both prescription and OTC) to your next visit. Kindly notify the clinic if you are unable to make to your next appointment. Follow warfarin dosing instructions on dosing calendar provided. Fall

## 2025-06-21 NOTE — ED PROVIDER NOTE - CLINICAL SUMMARY MEDICAL DECISION MAKING FREE TEXT BOX
69-year-old female presents to the ED from the urgent care status post mechanical trip and fall outside falling on the sidewalk.  Patient with complaints of left-sided facial pain and bruising.  Patient was seen at urgent care and sent in for CAT scan for further evaluation and treatment.  CT head and maxillofacial rule out intracranial pathology secondary to trauma.

## 2025-06-21 NOTE — ED PROVIDER NOTE - OBJECTIVE STATEMENT
69-year-old female with history of asthma, hypertension, hypothyroidism, HLD, GERD presents with complaint of left facial injury status post fall x 3 days.  States that she tripped and fell onto her face on her concrete driveway 2 days ago, on Thursday, 6/19.  States that she has pain and bruising to the left side of her face.  States that she also has mild left shoulder pain for which x-rays were done at the urgent care and was reportedly negative.  States that she was seen in urgent care today and sent to ER for CT scan and further evaluation.  States that she had 1 episode of vomiting today and feels occasional dizziness.  Denies LOC, use of blood thinners, vision changes, numbness, tingling, focal weakness, neck/back/leg pain, chest pain, difficulty breathing, abdominal pain or other symptoms/injuries.

## 2025-06-21 NOTE — ED PROVIDER NOTE - PATIENT PORTAL LINK FT
You can access the FollowMyHealth Patient Portal offered by [facility] by registering at the following website: [email or web address]. By joining [facility]’s FollowMyHealth portal, you will also be able to view your health information using other applications (apps) compatible with our system.

## 2025-06-21 NOTE — ED ADULT NURSE NOTE - NSFALLHARMRISKINTERV_ED_ALL_ED
Assistance OOB with selected safe patient handling equipment if applicable/Communicate risk of Fall with Harm to all staff, patient, and family/Provide visual cue: red socks, yellow wristband, yellow gown, etc/Reinforce activity limits and safety measures with patient and family/Bed in lowest position, wheels locked, appropriate side rails in place/Call bell, personal items and telephone in reach/Instruct patient to call for assistance before getting out of bed/chair/stretcher/Non-slip footwear applied when patient is off stretcher/[place] to call system/Physically safe environment - no spills, clutter or unnecessary equipment/Purposeful Proactive Rounding/Room/bathroom lighting operational, light cord in reach

## 2025-06-21 NOTE — ED PROVIDER NOTE - CARE PROVIDERS DIRECT ADDRESSES
[email or web address]

## 2025-06-21 NOTE — ED PROVIDER NOTE - PROGRESS NOTE DETAILS
Reevaluated patient at bedside. Discussed the results of all diagnostic testing in ED and copies of all reports given.   An opportunity to ask questions was given.  Discussed the importance of prompt, close medical follow-up.  Patient will return with any changes, concerns or persistent / worsening symptoms.  Understanding of all instructions verbalized.

## 2025-06-21 NOTE — ED ADULT NURSE NOTE - OBJECTIVE STATEMENT
Patient A&Ox3 in no acute distress c/o of nausea started today , patient had a fall 3 days ago , hit her head, denied LOC ,  L side face L eye hematoma and bruises in different stage of body , L shoulder bruises , bilateral legs and arms bruises and hematoma in different stage of healing , no chest pain no difficulty breathing no headache no dizziness no blurred vision , no photophobia moving all extremities no facial droop clear speech at this time

## 2025-06-21 NOTE — ED ADULT TRIAGE NOTE - CHIEF COMPLAINT QUOTE
tripped,fell on sidewalk, left periorbital pain and ecchymosis, saw [facility] MD today, sent here for Ct Scan

## 2025-06-21 NOTE — ED PROVIDER NOTE - NSFOLLOWUPINSTRUCTIONS_ED_ALL_ED_FT
Follow-up with your PCP for reevaluation, ongoing care and treatment.  Rest, take Tylenol as directed for pain.  If having worsening of symptoms or other related symptoms, return to the ER immediately.    Facial or Scalp Contusion  A facial or scalp contusion is a bruise (contusion) on the face or head. Bruises happen when an injury causes bleeding under the skin. The bruise may turn blue, purple, or yellow (discoloration). Minor injuries may cause a bruise that is not painful. Some bruises are painful and swollen for a few weeks.    Injuries to the face and head usually cause a lot of swelling, especially around the eyes. You may have other injuries as well, such as broken bones or cuts.    What are the causes?  An injury to the face or head from an object.  A fall.  A hit to the face or head area.  Car accidents.  Sports injuries.  Attacks from another person (assaults).  What are the signs or symptoms?  Swelling in the area of the injury. The swelling may be in a small areas and very noticeable.  The injured area being a different color than normal.  Pain or soreness in the injured area.  If you also have broken bones, your nose might be a different shape, you may be unable to close your mouth and you might have vision changes.    How is this treated?  Applying cold compresses to the hurt area. This is often the best treatment.  Taking over-the-counter medicines to help take the pain away, if your doctor tells you to take them.  If there are any cuts, these will need to be repaired as well.  Any deeper injuries may require treatment and follow up with a specialist, such as a surgeon or eye specialist.  Follow these instructions at home:  Managing pain, stiffness, and swelling    Bag of ice on a towel on the skin.   If told, put ice on the injured area. To do this:  Put ice in a plastic bag.  Place a towel between your skin and the bag.  Leave the ice on for 20 minutes, 2–3 times a day.  Take off the ice if your skin turns bright red. This is very important. If you cannot feel pain, heat, or cold, you have a greater risk of damage to the area.  Raise the injured area above the level of your heart while you are sitting or lying down.  General instructions    Take over-the-counter and prescription medicines only as told by your doctor.  Rest as told by your doctor.  Return to your normal activities when your doctor says that it is safe.  Do not blow your nose if you have any broken bones in your face.  Eat soft foods if you are having jaw pain.  Keep all follow-up visits.  Contact a doctor if:  You have trouble biting or chewing.  Your pain or swelling gets worse.  The bruised area gets worse.  Get help right away if:  You have very bad pain or a headache, and medicine does not help.  You are very tired or confused.  Your personality changes.  You vomit.  You have a nosebleed that does not stop.  You see two of everything (double vision) or have blurry vision.  You have clear fluid coming from your nose or ear, and it does not go away.  You have problems walking or using your arms or legs.  You feel very dizzy.  Summary  A facial or scalp contusion is a bruise on the face or head.  Bruises happen when an injury causes bleeding under the skin.  Minor injuries will cause a bruise that is not painful, but worse bruises can stay painful and swollen for a few weeks.  Go to a doctor if you have problems seeing, bleeding from your face or nose, or you have trouble biting or chewing.  Applying cold compresses to the hurt area is often the best treatment.

## 2025-07-29 ENCOUNTER — RX RENEWAL (OUTPATIENT)
Age: 70
End: 2025-07-29

## 2025-08-13 ENCOUNTER — RX RENEWAL (OUTPATIENT)
Age: 70
End: 2025-08-13